# Patient Record
Sex: FEMALE | Race: WHITE | Employment: OTHER | URBAN - METROPOLITAN AREA
[De-identification: names, ages, dates, MRNs, and addresses within clinical notes are randomized per-mention and may not be internally consistent; named-entity substitution may affect disease eponyms.]

---

## 2017-01-16 DIAGNOSIS — G25.81 RESTLESS LEG SYNDROME: Primary | ICD-10-CM

## 2017-01-16 RX ORDER — PRAMIPEXOLE DIHYDROCHLORIDE 1.5 MG/1
1.5 TABLET ORAL 2 TIMES DAILY
Qty: 180 TABLET | Refills: 1 | Status: SHIPPED | OUTPATIENT
Start: 2017-01-16 | End: 2017-07-19

## 2017-01-16 NOTE — TELEPHONE ENCOUNTER
PRAMIPEXOLE     Last Written Prescription Date: 10/26/16  Last Fill Quantity: 180, # refills: 0  Last Office Visit with [facility], [facility] or [facility] prescribing provider: 10/26/16   Next 5 appointments (look out 90 days)     Jan 19, 2017  1:00 PM   Return Visit with Sj Jeffers MD   [facility] ([facility])    [address] [place] [id number]   [phone]            Mar 27, 2017  1:30 PM   Return Visit with Mary Cornejo MD   [facility] ([facility])    [address] [place] [id number] [phone]                   BP Readings from Last 3 Encounters:   10/26/16 107/60   08/12/16 106/70   08/04/16 87/56

## 2017-01-19 ENCOUNTER — OFFICE VISIT (OUTPATIENT)
Dept: NEUROSURGERY | Facility: CLINIC | Age: 57
End: 2017-01-19
Payer: COMMERCIAL

## 2017-01-19 VITALS — HEART RATE: 74 BPM | BODY MASS INDEX: 31.19 KG/M2 | RESPIRATION RATE: 18 BRPM | WEIGHT: 165 LBS | TEMPERATURE: 97.2 F

## 2017-01-19 DIAGNOSIS — G89.29 CHRONIC BILATERAL LOW BACK PAIN WITHOUT SCIATICA: Primary | ICD-10-CM

## 2017-01-19 DIAGNOSIS — M54.50 CHRONIC BILATERAL LOW BACK PAIN WITHOUT SCIATICA: Primary | ICD-10-CM

## 2017-01-19 PROCEDURE — 99213 OFFICE O/P EST LOW 20 MIN: CPT | Performed by: NEUROLOGICAL SURGERY

## 2017-01-19 ASSESSMENT — PAIN SCALES - GENERAL: PAINLEVEL: SEVERE PAIN (6)

## 2017-01-19 NOTE — PROGRESS NOTES
"Vesta Rodriguez is a 56 year old female who presents for:  Chief Complaint   Patient presents with     Neurologic Problem     pt states mid and low back pain is worse        Initial Vitals:  Pulse 74  Temp(Src) 97.2  F (36.2  C) (Temporal)  Resp 18  Wt 74.844 kg (165 lb) Estimated body mass index is 31.19 kg/(m^2) as calculated from the following:    Height as of 12/15/16: 1.549 m (5' 1\").    Weight as of this encounter: 74.844 kg (165 lb).. There is no height on file to calculate BSA. BP completed using cuff size: NA (Not Taken)  Severe Pain (6)    Do you feel safe in your environment?  Yes  Do you need any refills today? No    Nursing Comments:        nursing intake time  Berna Khan    Discharge plan:    nursing discharge time   signature    "

## 2017-01-19 NOTE — PATIENT INSTRUCTIONS
You may call these doctors for a second opinion:   Sesar Kee at [facility] M: [phone]   Dallin Jacques at [facility] Ortho: [phone]  Ashwini Fan at [facility] M: [phone]

## 2017-01-19 NOTE — MR AVS SNAPSHOT
After Visit Summary   1/19/2017    Vesta Rodriguez    MRN: [id number]           Patient Information     Date Of Birth          1960        Visit Information        Provider Department      1/19/2017 1:00 PM Sj Jeffers MD [facility]        Care Instructions    You may call these doctors for a second opinion:   Sesar Kee at [facility] M: [phone]   Dallin Jacques at [facility] Ortho: [phone]  Ashwini Fan at [facility] M: [phone]          Follow-ups after your visit        Your next 10 appointments already scheduled     Mar 27, 2017  1:00 PM   LAB with LAB FIRST FLOOR [facility] ([facility])    [address] [place] [id number] [phone]           Patient must bring picture ID.  Patient should be prepared to give a urine specimen  Please do not eat 10-12 hours before your appointment if you are coming in fasting for labs on lipids, cholesterol, or glucose (sugar).  Pregnant women should follow their Care Team instructions. Water with medications is okay. Do not drink coffee or other fluids.   If you have concerns about taking  your medications, please ask at office or if scheduling via [email or web address], send a message by clicking on Secure Messaging, Message Your Care Team.            Mar 27, 2017  1:30 PM   Return Visit with Mary Cornejo MD   [facility] [place] 2[place]    [address] [place] [id number] [phone]              Who to contact     If you have questions or need follow up information about today's clinic visit or your schedule please contact [facility] directly at [phone].  Normal or non-critical lab and imaging results will be communicated to you by MyChart, letter or phone within 4 business days after the clinic has received the results. If you do not hear from us within 7 days, please contact the clinic through MyChart or phone.  If you have a critical or abnormal lab result, we will notify you by phone as soon as possible.  Submit refill requests through [employer] or call your pharmacy and they will forward the refill request to us. Please allow 3 business days for your refill to be completed.          Additional Information About Your Visit        [employer] Information     [employer] gives you secure access to your electronic health record. If you see a primary care provider, you can also send messages to your care team and make appointments. If you have questions, please call your primary care clinic.  If you do not have a primary care provider, please call [phone] and they will assist you.        Care EveryWhere ID     This is your Care EveryWhere ID. This could be used by other organizations to access your [facility] medical records  [id number]        Your Vitals Were     Pulse Temperature Respirations             74 97.2  F (36.2  C) (Temporal) 18          Blood Pressure from Last 3 Encounters:   10/26/16 107/60   08/12/16 106/70   08/04/16 87/56    Weight from Last 3 Encounters:   01/19/17 165 lb (74.844 kg)   01/19/17 165 lb 1.6 oz (74.889 kg)   12/15/16 159 lb (72.122 kg)              Today, you had the following     No orders found for display       Primary Care Provider Office Phone # Fax #    Lakshmi JAVIER Anguiano [phone]       [address] [address]        Thank you!     Thank you for choosing [facility]  for your care. Our goal is always to provide you with excellent care. Hearing back from our patients is one way we can continue to improve our services. Please take a few minutes to complete the written survey that you may receive in the mail after your visit with us. Thank you!             Your Updated Medication List - Protect others around you: Learn how to safely use, store and throw away your medicines at www.disposemymeds.org.          This  list is accurate as of: 1/19/17  2:18 PM.  Always use your most recent med list.                   Brand Name Dispense Instructions for use    fluticasone-salmeterol 100-50 MCG/DOSE diskus inhaler    ADVAIR    2 Inhaler    Inhale 1 puff into the lungs 2 times daily       ibuprofen 600 MG tablet    ADVIL/MOTRIN    30 tablet    Take 1 tablet (600 mg) by mouth every 8 hours as needed for moderate pain       methimazole 5 MG tablet    TAPAZOLE    90 tablet    Take 1 tablet (5 mg) by mouth daily       omeprazole 20 MG CR capsule    priLOSEC    30 capsule    Take 1 capsule (20 mg) by mouth daily       order for DME      Equipment being ordered: CPAP       pramipexole 1.5 MG tablet    MIRAPEX    180 tablet    Take 1 tablet (1.5 mg) by mouth 2 times daily       REMERON 15 MG tablet   Generic drug:  mirtazapine      Take 15 mg by mouth At Bedtime       teriparatide (recombinant) 600 MCG/2.4ML Soln injection    FORTEO    1 mL    Inject 0.08 mLs (20 mcg) Subcutaneous daily       VISTARIL 25 MG capsule   Generic drug:  hydrOXYzine      Take 25 mg by mouth 3 times daily as needed for itching

## 2017-01-19 NOTE — PROGRESS NOTES
56-year-old female with history of L3 to sacrum anterior-posterior fusion, and L1 kyphoplasty, presents with progressive back pain.  She notes six months of progressive mid and low back pain, radiating side to side, eight out of 10 and aching, worse when she stands or walks.  Whereas she previously used her routinely walk multiple miles in a day, she now is only able to walk less than 50 feet.  She denies radiation into the legs or leg symptoms.  She denies weakness or sensory changes.  She has undergone epidural steroid injection and is currently enrolled in physical therapy.         Past Medical History   Diagnosis Date     Degeneration of cervical intervertebral disc      04     Degeneration of lumbar or lumbosacral intervertebral disc      Anemia due to blood loss, acute 10/21/2012     Anemia      Right ureteral stone      Chronic cholecystitis      Major depression in remission (H)      Vitamin D deficiency      Anxiety      Back pain      Sleep disturbance      RLS (restless legs syndrome)      Osteoporosis      UTI (urinary tract infection)      RECENT- ON ANTIBIOTIC, STILL BACTERIA IN URINE     Past Surgical History   Procedure Laterality Date     Surgical history of -         section     Surgical history of -        Tubal ligation     Surgical history of -        Post hip fracture     Surgical history of -        hardware removal, right iliac crest      Hysterectomy, traci       no oophorectomy     Laparoscopic cholecystectomy  2012     Procedure: LAPAROSCOPIC CHOLECYSTECTOMY;  Laparoscopic vs Open Cholecystectomy;  Surgeon: Esperanza Quinn MD;  Location: [facility] OR     Fusion cervical anterior two levels       C 5-7     Fusion lumbar anterior three+ levels       L3- SI     Laser holmium lithotripsy ureter(s), insert stent, combined  2012     Procedure: COMBINED CYSTOSCOPY, URETEROSCOPY, LASER HOLMIUM LITHOTRIPSY URETER(S), INSERT STENT;  Cystoscopy, Right Ureteroscopy Holmium  Laser Lithotripsy , right ureteral stent placement *Latex Safe*;  Surgeon: Joshua Rm MD;  Location: [facility]     Vertebroplasty       Social History     Social History     Marital Status:      Spouse Name: N/A     Number of Children: N/A     Years of Education: N/A     Occupational History     Not on file.     Social History Main Topics     Smoking status: Current Every Day Smoker -- 1.00 packs/day     Types: Cigarettes     Smokeless tobacco: Never Used     Alcohol Use: No     Drug Use: No     Sexual Activity: No     Other Topics Concern     Parent/Sibling W/ Cabg, Mi Or Angioplasty Before 65f 55m? No     Social History Narrative     Family History   Problem Relation Age of Onset     Breast Cancer Mother       age 31     Alcohol/Drug Other      HEART DISEASE Brother         ROS: 10 point ROS neg other than the symptoms noted above in the HPI.    Physical Exam  Pulse 74  Temp(Src) 97.2  F (36.2  C) (Temporal)  Resp 18  Wt 74.844 kg (165 lb)  HEENT:  Normocephalic, atraumatic.  PERRLA.  EOM s intact.  Visual fields full to gross exam  Neck:  Supple, non-tender, without lymphadenopathy.  Heart:  No peripheral edema  Lungs:  No SOB  Abdomen:  Non-distended.   Skin:  Warm and dry.  Extremities:  No edema, cyanosis or clubbing.    NEUROLOGICAL EXAMINATION:     Mental status:  Alert and Oriented x 3, speech is fluent.  Cranial nerves:  II-XII intact.   Motor:  Strength is 5/5 throughout the upper and lower extremities  Shoulder Abduction:  Right:  5/5   Left:  5/5  Biceps:                      Right:  5/5   Left:  5/5  Triceps:                     Right:  5/5   Left:  5/5  Wrist Extensors:       Right:  5/5   Left:  5/5  Wrist Flexors:           Right:  5/5   Left:  5/5  interosseus :            Right:  5/5   Left:  5/5   Hip Flexor:                Right: 5/5  Left:  5/5  Hip Adductor:             Right:  5/5  Left:  5/5  Hip Abductor:             Right:  5/5  Left:  5/5  Gastroc Soleus:         Right:  5/5  Left:  5/5  Tib/Ant:                      Right:  5/5  Left:  5/5  EHL:                     Right:  5/5  Left:  5/5  Sensation:  Intact  Reflexes:  Negative Babinski.  Negative Clonus.  Negative Fish's.  Coordination:  Smooth finger to nose testing.   Negative pronator drift.  Smooth tandem walking.    A/P:  56-year-old female with history of L3 to sacrum anterior-posterior fusion, and L1 kyphoplasty, presents with progressive back pain    I had a lengthy discussion with the patient, reviewing the history, symptoms and imaging  We reviewed that she has adjacent segment degeneration at L1-2 and L2-3  We also discussed, however, that her back pain could be confounded by her L1 compression fracture and kyphoplasty  We also discussed that a reconstructive operation would be significantly limited by the need to extend well above the compression fracture into the thoracic spine, and need to remove old hardware  Thus surgery for her would involve revision fusion from the thoracic spine to the lumbar, significant blood loss and risk profile

## 2017-01-20 ENCOUNTER — PRE VISIT (OUTPATIENT)
Dept: ORTHOPEDICS | Facility: CLINIC | Age: 57
End: 2017-01-20

## 2017-01-20 NOTE — TELEPHONE ENCOUNTER
1.  Date/reason for appt: 5/9/17 at 8 AM - Low Back Pain  2.  Referring provider: Dr. [facility]  3.  Call to patient (Yes / No - short description):   YES -  Pt transfer from call center, outside recs discussed (2 surgeries total, 1 was a kyphoplasty in 2013)  4.  Previous care at:   - [facility] (Dr. Jeffers and Joseluis Scott)   - [facility] [place]   - [facility]       - [facility] (Dr. Alonso, 2 surgeries/imaging)     2004- 2013 records scanned in epic (emailed JASIEL, need kyphospalsty op note 2013)     12/13/09 Discography report (scanned in)    3/23/09 Dexa (scanned in)   - [facility] (for Dr. Alonso surgeries) - need implant records from lumbar fusion    Op note: 9/30/09 Stage 1 anterior spinal fusion L4-S1    Op note: 9/30/09 Stage 2 posterior spinal fusion L3-S1    Rad Reports: C Arm L3-S1 9/30/16 & 10/21/09 CT L Spine (scanned in [facility])    Hosp D/C Note: 9/30/16   - [facility]     PT Note: 2/1/16 scanned in    Rad Report: 12/28/16 MRI T Spine (report in epic, img in pacs)    Imaging in Pacs: CR L Spine 12/22/16 and XR L spine 9/1/15 (need images pushed)   - [facility]     Seen in 2013 for lumbar fracture (need records)

## 2017-01-31 ENCOUNTER — OFFICE VISIT (OUTPATIENT)
Dept: ORTHOPEDICS | Facility: CLINIC | Age: 57
End: 2017-01-31
Payer: COMMERCIAL

## 2017-01-31 VITALS
BODY MASS INDEX: 31.15 KG/M2 | HEIGHT: 61 IN | WEIGHT: 165 LBS | SYSTOLIC BLOOD PRESSURE: 112 MMHG | DIASTOLIC BLOOD PRESSURE: 68 MMHG | HEART RATE: 72 BPM

## 2017-01-31 DIAGNOSIS — M54.42 CHRONIC BILATERAL LOW BACK PAIN WITH BILATERAL SCIATICA: Primary | ICD-10-CM

## 2017-01-31 DIAGNOSIS — M54.41 CHRONIC BILATERAL LOW BACK PAIN WITH BILATERAL SCIATICA: Primary | ICD-10-CM

## 2017-01-31 DIAGNOSIS — G89.29 CHRONIC BILATERAL LOW BACK PAIN WITH BILATERAL SCIATICA: Primary | ICD-10-CM

## 2017-01-31 PROCEDURE — 99213 OFFICE O/P EST LOW 20 MIN: CPT | Performed by: PHYSICIAN ASSISTANT

## 2017-01-31 RX ORDER — HYDROCODONE BITARTRATE AND ACETAMINOPHEN 5; 325 MG/1; MG/1
1 TABLET ORAL EVERY 8 HOURS PRN
Qty: 30 TABLET | Refills: 0 | Status: SHIPPED | OUTPATIENT
Start: 2017-01-31 | End: 2017-05-08

## 2017-01-31 NOTE — NURSING NOTE
"Initial /68 mmHg  Pulse 72  Ht 5' 1\" (1.549 m)  Wt 165 lb (74.844 kg)  BMI 31.19 kg/m2 Estimated body mass index is 31.19 kg/(m^2) as calculated from the following:    Height as of this encounter: 5' 1\" (1.549 m).    Weight as of this encounter: 165 lb (74.844 kg). .        "

## 2017-01-31 NOTE — PROGRESS NOTES
Sports Medicine Clinic Visit    PCP: Lakshmi Khouryelver Rodriguez is a 56 year old female who is seen  in follow-up presenting with back pain. Was seen by Dr. Jeffers a spine surgeon, but was unable to treat according to patient.    Injury: none    Location of Pain: right hip to groin and down to lower leg, infrequently this occur on the left side as well. Pain radiates up to base of cervical spine.  Duration of Pain: 1 year(s)  Rating of Pain at worst: 10/10  Rating of Pain Currently: 6/10  Symptoms are better with: Nothing  Symptoms are worse with: Walking and going up/down stairs.  Additional Features: Feels that it will give out while walking  Other treatments so far consists of: Nothing  Prior History of related problems: Had hip fracture , has screws removed in , compression fracture and vertebroplasty in     Review of Systems  Musculoskeletal: as above  Remainder of review of systems is negative including constitutional, CV, pulmonary, GI, Skin and Neurologic except as noted in HPI or medical history.    Family history, medical history and surgical history have all been discussed with patient and appended to medical chart below.    Past Medical History   Diagnosis Date     Degeneration of cervical intervertebral disc      04     Degeneration of lumbar or lumbosacral intervertebral disc      Anemia due to blood loss, acute 10/21/2012     Anemia      Right ureteral stone      Chronic cholecystitis      Major depression in remission (H)      Vitamin D deficiency      Anxiety      Back pain      Sleep disturbance      RLS (restless legs syndrome)      Osteoporosis      UTI (urinary tract infection)      RECENT- ON ANTIBIOTIC, STILL BACTERIA IN URINE     Past Surgical History   Procedure Laterality Date     Surgical history of -         section     Surgical history of -        Tubal ligation     Surgical history of -        Post hip fracture     Surgical history of -         "hardware removal, right iliac crest      Hysterectomy, traci       no oophorectomy     Laparoscopic cholecystectomy  2012     Procedure: LAPAROSCOPIC CHOLECYSTECTOMY;  Laparoscopic vs Open Cholecystectomy;  Surgeon: Esperanza Quinn MD;  Location: [facility] OR     Fusion cervical anterior two levels       C 5-7     Fusion lumbar anterior three+ levels       L3- SI     Laser holmium lithotripsy ureter(s), insert stent, combined  2012     Procedure: COMBINED CYSTOSCOPY, URETEROSCOPY, LASER HOLMIUM LITHOTRIPSY URETER(S), INSERT STENT;  Cystoscopy, Right Ureteroscopy Holmium Laser Lithotripsy , right ureteral stent placement *Latex Safe*;  Surgeon: Joshua Rm MD;  Location: [facility]     Vertebroplasty       Family History   Problem Relation Age of Onset     Breast Cancer Mother       age 31     Alcohol/Drug Other      HEART DISEASE Brother      Objective  /68 mmHg  Pulse 72  Ht 5' 1\" (1.549 m)  Wt 165 lb (74.844 kg)  BMI 31.19 kg/m2  Constitutional:well-developed, well-nourished, and in no distress.   Cardiovascular: Intact distal pulses.    Neurological: alert. Gait normal  Skin: Skin is warm and dry.   Psychiatric: Mood and affect normal.   Respiratory: unlabored, speaks in full sentences  Hematologic/Lymphatic/Immunologic: neg lymphadenopathy, neg lymphedema    Exam:  Back Exam:    Inspection:       no visible deformity in the low back       normal skin       normal vascular       normal lymphatic       Increased paralumbar and parathoracic muscle tone    ROM:       limited flexion due to pain       limited extension due to pain       Limited SB and LR    Tender/ Tissue Texture Abnormality:       paraspinal muscles    Non Tender:       remainder of lumbar spine    Strength:       hip flexion 4/5       knee extension 3/5       ankle dorsiflexion 4/5       ankle plantarflexion 4/5       dorsiflexion of the great toe 5/5      Hip abduction: 4/5      Hip adduction:  " 4/5    Reflexes:       patellar (L3, L4) symmetric normal       achilles tendons (S1) symmetric normal    Sensation:      grossly intact throughout lower extremities    Special tests:       straight leg raise left neg         straight leg raise right neg        Radiology:  CLINICAL INFORMATION: Low back and right thigh pain and pressure since fusion surgery 09/30/2009.  TECHNICAL INFORMATION: High resolution thin section CT imaging was performed through the postoperative levels and adjacent transition levels. High resolution coronal and sagittal reformatted images were produced.  INTERPRETATION: AP and lateral digital  radiographs show anterior interbody fusions and anterior screw plates at L4-5 and L5-S1 with dorsal pedicle screw instrumentation and radiolucent connecting rods.  Axial images through the upper sacrum are negative for fracture. A right posterior iliac bone graft harvest site is noted.  At L5-S1, the interbody fusion is solid. Anterior screws are well directed without loosening and pedicle screws are also well directed with no evidence of loosening. There has been wide dorsal decompression with no obvious recurrent disc herniation. Foramina appear patent bilaterally to the extent visualized. L5 pedicle screws are well directed without loosening.  At L4-5, the anterior screw-plate is well positioned. The interbody fusion is healing with areas of incorporation and fusion across the disc interspace already present. There is broad-based residual bulging disc with flattening of the thecal sac and underlying developmentally small central spinal canal. Ganglia exit without compromise and facet joints are unremarkable. A discrete right-sided disc herniation is not apparent although soft tissue contrast resolution within the central spinal canal is limited due to metallic artifacts.  At L3-4, there is no disc herniation or central spinal stenosis. Foramina appear patent. L3 and L4 pedicle screws appear  well directed without loosening.  At L2-3, there is no disc herniation or central stenosis and there is circumferential annular bulging. Foramina appear patent and facet joints are unremarkable.  The L1-2 level is also negative for disc herniation or stenosis  CONCLUSION:  1. The interbody fusions appear solid already at L4-5 and L5-S1 with no instrumentation complications or obvious residual foraminal stenosis. Bulging disc is suspected especially to the left of midline at L4-5 with no apparent right-sided herniation identified, to the extent visualized.  2. The L3-4, L2-3 and L1-2 levels are negative for disc herniation or stenosis.  3. There are no acute fractures identified.  WJM:dread  I have personally reviewed images with patient    Assessment:  1. Chronic bilateral low back pain with bilateral sciatica        Plan:  Discussed the assessment with the patient.  Once again, I have discussed with her multiple treatment options including osteopathic manipulation, manual therapies, along with other physical therapy modalities.  If patient is not satisfied with treatment options provided by orthopedic spine surgeon, I recommend consultation with pain management.  I have provided her a small prescription for pain medication to use sparingly along with her ibuprofen.  I am also recommended she use topical analgesics along with heat and ice. I will not be refilling her pain medication as I do not believe that this is an appropriate long term solution.       Angela Muller PA-C  [facility]  [facility]      Disclaimer: This note consists of symbols derived from keyboarding, dictation and/or voice recognition software. As a result, there may be errors in the script that have gone undetected. Please consider this when interpreting information found in this chart.

## 2017-01-31 NOTE — MR AVS SNAPSHOT
After Visit Summary   1/31/2017    Vesta Rodriguez    MRN: [id number]           Patient Information     Date Of Birth          1960        Visit Information        Provider Department      1/31/2017 9:40 AM Angela Muller PA-C [facility]        Today's Diagnoses     Chronic bilateral low back pain with bilateral sciatica    -  1        Follow-ups after your visit        Additional Services     ORTHO  REFERRAL       [facility] is referring you to the Orthopedic  Services at [facility].       The  Representative will assist you in the coordination of your Orthopedic and Musculoskeletal Care as prescribed by your physician.    The  Representative will call you within 1 business day to help schedule your appointment, or you may contact the  Representative at:    All areas ~ [phone]     Type of Referral : Dr. Dickens - [facility]       Timeframe requested: 3 - 5 days    Coverage of these services is subject to the terms and limitations of your health insurance plan.  Please call member services at your health plan with any benefit or coverage questions.      If X-rays, CT or MRI's have been performed, please contact the facility where they were done to arrange for , prior to your scheduled appointment.  Please bring this referral request to your appointment and present it to your specialist.                  Your next 10 appointments already scheduled     Feb 23, 2017  9:30 AM   (Arrive by 9:15 AM)   New Patient Visit with Ashwini Fan MD   [facility] Neurosurgery ([facility])    [address]  3rd Floor  [facility] [id number] [phone]            Mar 27, 2017  1:00 PM   LAB with LAB FIRST FLOOR [facility] ([facility])    [address] [place] [address] [phone]            Patient must bring picture ID.  Patient should be prepared to give a urine specimen  Please do not eat 10-12 hours before your appointment if you are coming in fasting for labs on lipids, cholesterol, or glucose (sugar).  Pregnant women should follow their Care Team instructions. Water with medications is okay. Do not drink coffee or other fluids.   If you have concerns about taking  your medications, please ask at office or if scheduling via [employer], send a message by clicking on Secure Messaging, Message Your Care Team.            Mar 27, 2017  1:30 PM   Return Visit with Mary Cornejo MD   [facility] ([facility]    [address] [place] [address] [phone]            May 09, 2017  8:00 AM   (Arrive by 7:30 AM)   NEW SPINE with Fabrice Jameson MD   [facility] Orthopaedic [facility] ([facility])    [address] [address] [phone]              Who to contact     If you have questions or need follow up information about today's clinic visit or your schedule please contact [facility] directly at [phone].  Normal or non-critical lab and imaging results will be communicated to you by MyChart, letter or phone within 4 business days after the clinic has received the results. If you do not hear from us within 7 days, please contact the clinic through [employer] or phone. If you have a critical or abnormal lab result, we will notify you by phone as soon as possible.  Submit refill requests through [employer] or call your pharmacy and they will forward the refill request to us. Please allow 3 business days for your refill to be completed.          Additional Information About Your Visit        G[employer] Information     [employer] gives you secure access to your electronic health record. If you see a primary care provider, you can also send messages to your care team and make  "appointments. If you have questions, please call your primary care clinic.  If you do not have a primary care provider, please call [phone] and they will assist you.        Care EveryWhere ID     This is your Care EveryWhere ID. This could be used by other organizations to access your [place] medical records  [id number]        Your Vitals Were     Pulse Height BMI (Body Mass Index)             72 5' 1\" (1.549 m) 31.19 kg/m2          Blood Pressure from Last 3 Encounters:   01/31/17 112/68   10/26/16 107/60   08/12/16 106/70    Weight from Last 3 Encounters:   01/31/17 165 lb (74.844 kg)   01/19/17 165 lb (74.844 kg)   01/19/17 165 lb 1.6 oz (74.889 kg)              We Performed the Following     ORTHO  REFERRAL          Today's Medication Changes          These changes are accurate as of: 1/31/17  9:55 AM.  If you have any questions, ask your nurse or doctor.               Start taking these medicines.        Dose/Directions    HYDROcodone-acetaminophen 5-325 MG per tablet   Commonly known as:  NORCO   Used for:  Chronic bilateral low back pain with bilateral sciatica   Started by:  Angela Muller PA-C        Dose:  1 tablet   Take 1 tablet by mouth every 8 hours as needed for pain   Quantity:  30 tablet   Refills:  0            Where to get your medicines      Some of these will need a paper prescription and others can be bought over the counter.  Ask your nurse if you have questions.     Bring a paper prescription for each of these medications    - HYDROcodone-acetaminophen 5-325 MG per tablet             Primary Care Provider Office Phone # Fax #    JAVIER Child [phone]       [facility] [address] [address]        Thank you!     Thank you for choosing [facility]  for your care. Our goal is always to provide you with excellent care. Hearing back from our patients is one way we can continue to improve our " services. Please take a few minutes to complete the written survey that you may receive in the mail after your visit with us. Thank you!             Your Updated Medication List - Protect others around you: Learn how to safely use, store and throw away your medicines at www.disposemymeds.org.          This list is accurate as of: 1/31/17  9:55 AM.  Always use your most recent med list.                   Brand Name Dispense Instructions for use    fluticasone-salmeterol 100-50 MCG/DOSE diskus inhaler    ADVAIR    2 Inhaler    Inhale 1 puff into the lungs 2 times daily       HYDROcodone-acetaminophen 5-325 MG per tablet    NORCO    30 tablet    Take 1 tablet by mouth every 8 hours as needed for pain       ibuprofen 600 MG tablet    ADVIL/MOTRIN    30 tablet    Take 1 tablet (600 mg) by mouth every 8 hours as needed for moderate pain       methimazole 5 MG tablet    TAPAZOLE    90 tablet    Take 1 tablet (5 mg) by mouth daily       omeprazole 20 MG CR capsule    priLOSEC    30 capsule    Take 1 capsule (20 mg) by mouth daily       order for DME      Equipment being ordered: CPAP       pramipexole 1.5 MG tablet    MIRAPEX    180 tablet    Take 1 tablet (1.5 mg) by mouth 2 times daily       REMERON 15 MG tablet   Generic drug:  mirtazapine      Take 15 mg by mouth At Bedtime       teriparatide (recombinant) 600 MCG/2.4ML Soln injection    FORTEO    1 mL    Inject 0.08 mLs (20 mcg) Subcutaneous daily       VISTARIL 25 MG capsule   Generic drug:  hydrOXYzine      Take 25 mg by mouth 3 times daily as needed for itching

## 2017-02-06 ENCOUNTER — OFFICE VISIT (OUTPATIENT)
Dept: FAMILY MEDICINE | Facility: CLINIC | Age: 57
End: 2017-02-06
Payer: MEDICARE

## 2017-02-06 VITALS
HEART RATE: 102 BPM | OXYGEN SATURATION: 98 % | DIASTOLIC BLOOD PRESSURE: 72 MMHG | WEIGHT: 164.2 LBS | SYSTOLIC BLOOD PRESSURE: 122 MMHG | RESPIRATION RATE: 24 BRPM | HEIGHT: 62 IN | TEMPERATURE: 97.8 F | BODY MASS INDEX: 30.22 KG/M2

## 2017-02-06 DIAGNOSIS — M81.0 OSTEOPOROSIS: ICD-10-CM

## 2017-02-06 DIAGNOSIS — E05.00 GRAVES DISEASE: Primary | ICD-10-CM

## 2017-02-06 DIAGNOSIS — M51.26 DISPLACEMENT OF LUMBAR INTERVERTEBRAL DISC WITHOUT MYELOPATHY: ICD-10-CM

## 2017-02-06 LAB
CALCIUM SERPL-MCNC: 9.8 MG/DL (ref 8.5–10.1)
TSH SERPL DL<=0.005 MIU/L-ACNC: 0.59 MU/L (ref 0.4–4)

## 2017-02-06 PROCEDURE — 84443 ASSAY THYROID STIM HORMONE: CPT | Performed by: NURSE PRACTITIONER

## 2017-02-06 PROCEDURE — 82310 ASSAY OF CALCIUM: CPT | Performed by: NURSE PRACTITIONER

## 2017-02-06 PROCEDURE — 36415 COLL VENOUS BLD VENIPUNCTURE: CPT | Performed by: NURSE PRACTITIONER

## 2017-02-06 PROCEDURE — 99214 OFFICE O/P EST MOD 30 MIN: CPT | Performed by: NURSE PRACTITIONER

## 2017-02-06 ASSESSMENT — ANXIETY QUESTIONNAIRES
3. WORRYING TOO MUCH ABOUT DIFFERENT THINGS: NEARLY EVERY DAY
5. BEING SO RESTLESS THAT IT IS HARD TO SIT STILL: NEARLY EVERY DAY
GAD7 TOTAL SCORE: 20
6. BECOMING EASILY ANNOYED OR IRRITABLE: NEARLY EVERY DAY
1. FEELING NERVOUS, ANXIOUS, OR ON EDGE: NEARLY EVERY DAY
7. FEELING AFRAID AS IF SOMETHING AWFUL MIGHT HAPPEN: MORE THAN HALF THE DAYS
2. NOT BEING ABLE TO STOP OR CONTROL WORRYING: NEARLY EVERY DAY

## 2017-02-06 ASSESSMENT — PAIN SCALES - GENERAL: PAINLEVEL: MILD PAIN (2)

## 2017-02-06 ASSESSMENT — PATIENT HEALTH QUESTIONNAIRE - PHQ9: 5. POOR APPETITE OR OVEREATING: NEARLY EVERY DAY

## 2017-02-06 NOTE — PROGRESS NOTES
SUBJECTIVE:                                                    Vesta Rodriguez is a 56 year old female who presents to clinic today for the following health issues:    Hypothyroidism Follow-up      Since last visit, patient describes the following symptoms: weight gain of 15 lbs       Amount of exercise or physical activity: None, unable due to back    Problems taking medications regularly: No    Medication side effects: none    Diet: regular (no restrictions)      Back Pain Follow Up       Description:   Location of pain:  bilateral  Character of pain: sharp  Pain radiation: radiates into the right buttocks and radiates into the left buttocks  Since last visit, pain is:  worsened  New numbness or weakness in legs, not attributed to pain:  YES weakness    Intensity: Currently 2/10, At its worst 10/10    History:   Pain interferes with job: Not applicable  Therapies tried without relief:  NSAIDs and opioids  Therapies tried with relief: heat, cold   Patient has an appointment with spine surgeon Dr Alonso. Had MRIs done in [place]       Accompanying Signs & Symptoms:  Risk of Fracture:  None  Risk of Cauda Equina:  None  Risk of Infection:  None  Risk of Cancer:  None       Hsitory includes L3 to sacrum anterior-posterior fusion, and L1 kyphoplasty. She has had progressive back pain which has been lessening her activity. Received epidural steroid injections and physical therapy. Saw Dr. Jeffers/ [facility] on 1/19/17 to be evaluated for possible surgery. Reports she was refused surgery. Now wants to see Dr. Alonso, her spine surgeon that did prior surgery.    Use of Aqua K pad in the past has been helpful. Needs order to obtain.      Amount of exercise or physical activity: None    Problems taking medications regularly: No    Medication side effects: none    Diet: regular (no restrictions)    Problem list and histories reviewed & adjusted, as indicated.  Additional history: as documented    Problem list,  "Medication list, Allergies, and Medical/Social/Surgical histories reviewed in EPIC and updated as appropriate.    ROS:  Constitutional, HEENT, cardiovascular, pulmonary, gi and gu systems are negative, except as otherwise noted.    OBJECTIVE:                                                    /72 mmHg  Pulse 102  Temp(Src) 97.8  F (36.6  C) (Tympanic)  Resp 24  Ht 5' 1.75\" (1.568 m)  Wt 164 lb 3.2 oz (74.481 kg)  BMI 30.29 kg/m2  SpO2 98%  Breastfeeding? No  Body mass index is 30.29 kg/(m^2).  GENERAL: healthy, alert and no distress  MS: extremities normal- no gross deformities noted  Comprehensive back pain exam:  Tenderness of lumbar paraspinal region    Diagnostic Test Results:  none      ASSESSMENT/PLAN:                                                      1. Displacement of lumbar intervertebral disc without myelopathy  Patient is planning to see Dr. Henson for his opinion regarding further surgery. As aqua K pad has been helpful in the past, will provide order  - order for DME; Equipment being ordered: Aqua K pad. Both pump and pad  Dispense: 1 Units; Refill: 0    2. Graves disease  - TSH with free T4 reflex    3. Osteoporosis  Noted in previous DEXA scan.   - Calcium    Patient Instructions   We will call you with the results of your labs.         Tammy Tyler, FLAVIA, APRN [facility]    "

## 2017-02-06 NOTE — MR AVS SNAPSHOT
After Visit Summary   2/6/2017    Vesta Rodriguez    MRN: [id number]           Patient Information     Date Of Birth          1960        Visit Information        Provider Department      2/6/2017 3:40 PM Tammy Tyler APRN CNP [facility]        Today's Diagnoses     Graves disease    -  1     Displacement of lumbar intervertebral disc without myelopathy         Osteoporosis           Care Instructions    We will call you with the results of your labs.         Follow-ups after your visit        Your next 10 appointments already scheduled     Mar 27, 2017  1:00 PM   LAB with LAB FIRST FLOOR [facility] ([facility])    [address] [place] [id number] [phone]           Patient must bring picture ID.  Patient should be prepared to give a urine specimen  Please do not eat 10-12 hours before your appointment if you are coming in fasting for labs on lipids, cholesterol, or glucose (sugar).  Pregnant women should follow their Care Team instructions. Water with medications is okay. Do not drink coffee or other fluids.   If you have concerns about taking  your medications, please ask at office or if scheduling via [employer], send a message by clicking on Secure Messaging, Message Your Care Team.            Mar 27, 2017  1:30 PM   Return Visit with aMry Cornejo MD   [facility])    [address] [place] [address] [phone]              Who to contact     If you have questions or need follow up information about today's clinic visit or your schedule please contact [facility] directly at [phone].  Normal or non-critical lab and imaging results will be communicated to you by MyChart, letter or phone within 4 business days after the clinic has received the results. If you do not hear from us within 7 days, please contact the clinic  "through [employer] or phone. If you have a critical or abnormal lab result, we will notify you by phone as soon as possible.  Submit refill requests through [employer] or call your pharmacy and they will forward the refill request to us. Please allow 3 business days for your refill to be completed.          Additional Information About Your Visit        [employer] Information     [employer] gives you secure access to your electronic health record. If you see a primary care provider, you can also send messages to your care team and make appointments. If you have questions, please call your primary care clinic.  If you do not have a primary care provider, please call [phone] and they will assist you.        Care EveryWhere ID     This is your Care EveryWhere ID. This could be used by other organizations to access your [facility] medical records  [id number]        Your Vitals Were     Pulse Temperature Respirations    102 97.8  F (36.6  C) (Tympanic) 24    Height BMI (Body Mass Index) Pulse Oximetry    5' 1.75\" (1.568 m) 30.29 kg/m2 98%    Breastfeeding?          No         Blood Pressure from Last 3 Encounters:   02/06/17 122/72   01/31/17 112/68   10/26/16 107/60    Weight from Last 3 Encounters:   02/06/17 164 lb 3.2 oz (74.481 kg)   01/31/17 165 lb (74.844 kg)   01/19/17 165 lb (74.844 kg)              We Performed the Following     Calcium     TSH with free T4 reflex          Today's Medication Changes          These changes are accurate as of: 2/6/17  4:22 PM.  If you have any questions, ask your nurse or doctor.               Start taking these medicines.        Dose/Directions    order for DME   Used for:  Displacement of lumbar intervertebral disc without myelopathy   Started by:  Tammy Tyler, JAVIER CNP        Equipment being ordered: Aqua K pad. Both pump and pad   Quantity:  1 Units   Refills:  0         Stop taking these medicines if you haven't already. Please contact your care team if you have questions.  "    ibuprofen 600 MG tablet   Commonly known as:  ADVIL/MOTRIN   Stopped by:  Tammy Tyler APRN CNP           omeprazole 20 MG CR capsule   Commonly known as:  priLOSEC   Stopped by:  Tammy Tyler APRN CNP           REMERON 15 MG tablet   Generic drug:  mirtazapine   Stopped by:  Tammy Tyler APRN CNP                Where to get your medicines      Some of these will need a paper prescription and others can be bought over the counter.  Ask your nurse if you have questions.     Bring a paper prescription for each of these medications    - order for DME             Primary Care Provider Office Phone # Fax #    Lakshmi Tejada JAVIER Khoury [phone]       [address] [address]        Thank you!     Thank you for choosing [facility]  for your care. Our goal is always to provide you with excellent care. Hearing back from our patients is one way we can continue to improve our services. Please take a few minutes to complete the written survey that you may receive in the mail after your visit with us. Thank you!             Your Updated Medication List - Protect others around you: Learn how to safely use, store and throw away your medicines at www.disposemymeds.org.          This list is accurate as of: 2/6/17  4:22 PM.  Always use your most recent med list.                   Brand Name Dispense Instructions for use    fluticasone-salmeterol 100-50 MCG/DOSE diskus inhaler    ADVAIR    2 Inhaler    Inhale 1 puff into the lungs 2 times daily       HYDROcodone-acetaminophen 5-325 MG per tablet    NORCO    30 tablet    Take 1 tablet by mouth every 8 hours as needed for pain       methimazole 5 MG tablet    TAPAZOLE    90 tablet    Take 1 tablet (5 mg) by mouth daily       order for DME      Equipment being ordered: CPAP       order for DME     1 Units    Equipment being ordered: Aqua K pad. Both pump and pad       pramipexole 1.5  MG tablet    MIRAPEX    180 tablet    Take 1 tablet (1.5 mg) by mouth 2 times daily       teriparatide (recombinant) 600 MCG/2.4ML Soln injection    FORTEO    1 mL    Inject 0.08 mLs (20 mcg) Subcutaneous daily       VISTARIL 25 MG capsule   Generic drug:  hydrOXYzine      Take 25 mg by mouth 3 times daily as needed for itching

## 2017-02-07 ASSESSMENT — ANXIETY QUESTIONNAIRES: GAD7 TOTAL SCORE: 20

## 2017-02-07 ASSESSMENT — PATIENT HEALTH QUESTIONNAIRE - PHQ9: SUM OF ALL RESPONSES TO PHQ QUESTIONS 1-9: 9

## 2017-02-10 ENCOUNTER — MYC MEDICAL ADVICE (OUTPATIENT)
Dept: GERIATRICS | Facility: CLINIC | Age: 57
End: 2017-02-10

## 2017-02-10 NOTE — TELEPHONE ENCOUNTER
Spoke with Jihan at  [facility] [phone]. They are requesting the [employer] to call them. As the need to know how much it will cost Etc.  Spoke with Rianna at  [employer] [phone] she will contact the [employer].  Pt is aware that Rianna will be working on this for her.   [facility]

## 2017-02-17 ENCOUNTER — MEDICAL CORRESPONDENCE (OUTPATIENT)
Dept: HEALTH INFORMATION MANAGEMENT | Facility: CLINIC | Age: 57
End: 2017-02-17

## 2017-02-17 ENCOUNTER — TRANSFERRED RECORDS (OUTPATIENT)
Dept: HEALTH INFORMATION MANAGEMENT | Facility: CLINIC | Age: 57
End: 2017-02-17

## 2017-03-08 ENCOUNTER — OFFICE VISIT (OUTPATIENT)
Dept: FAMILY MEDICINE | Facility: CLINIC | Age: 57
End: 2017-03-08
Payer: MEDICARE

## 2017-03-08 VITALS
BODY MASS INDEX: 31.35 KG/M2 | RESPIRATION RATE: 18 BRPM | WEIGHT: 170 LBS | SYSTOLIC BLOOD PRESSURE: 118 MMHG | OXYGEN SATURATION: 99 % | DIASTOLIC BLOOD PRESSURE: 60 MMHG | HEART RATE: 80 BPM | TEMPERATURE: 98.1 F

## 2017-03-08 DIAGNOSIS — E05.00 GRAVES DISEASE: ICD-10-CM

## 2017-03-08 DIAGNOSIS — J42 CHRONIC BRONCHITIS, UNSPECIFIED CHRONIC BRONCHITIS TYPE (H): ICD-10-CM

## 2017-03-08 DIAGNOSIS — Z01.818 PREOP GENERAL PHYSICAL EXAM: ICD-10-CM

## 2017-03-08 DIAGNOSIS — M51.9 DISC DISORDER OF LUMBAR REGION: ICD-10-CM

## 2017-03-08 PROBLEM — Z71.89 ADVANCED DIRECTIVES, COUNSELING/DISCUSSION: Status: ACTIVE | Noted: 2017-03-08

## 2017-03-08 PROCEDURE — 99214 OFFICE O/P EST MOD 30 MIN: CPT | Performed by: NURSE PRACTITIONER

## 2017-03-08 NOTE — MR AVS SNAPSHOT
After Visit Summary   3/8/2017    Vesta Rodriguez    MRN: [id number]           Patient Information     Date Of Birth          1960        Visit Information        Provider Department      3/8/2017 9:40 AM Tammy Tyler APRN CNP [facility]        Today's Diagnoses     Intervertebral disc prolapse with impingement    -  1    Disc disorder of lumbar region        Preop general physical exam          Care Instructions      Before Your Surgery      Call your surgeon if there is any change in your health. This includes signs of a cold or flu (such as a sore throat, runny nose, cough, rash or fever).    Do not smoke, drink alcohol or take over the counter medicine (unless your surgeon or primary care doctor tells you to) for the 24 hours before and after surgery.    If you take prescribed drugs: Follow your doctor s orders about which medicines to take and which to stop until after surgery.    Eating and drinking prior to surgery: follow the instructions from your surgeon    Take a shower or bath the night before surgery. Use the soap your surgeon gave you to gently clean your skin. If you do not have soap from your surgeon, use your regular soap. Do not shave or scrub the surgery site.  Wear clean pajamas and have clean sheets on your bed.         Follow-ups after your visit        Your next 10 appointments already scheduled     Mar 09, 2017   Procedure with Pérez Valle MD   [facility] ([facility])    [address] [place] [address]   [phone]           From [address]: Exit at [address] on south side of [place]. Turn right on [address]. Turn left at stoplight on [address]. [facility] will be in view two blocks ahead            Mar 09, 2017  9:30 AM CST   XR SURGERY KATHRYN LESS THAN 5 MIN FLUORO W STILLS with PHCARM1   [facility] [place] ([facility])    [address]  [place] [address] [phone]           Please bring a list of your current medicines to your exam. (Include vitamins, minerals and over-thecounter medicines.) Leave your valuables at home.  Tell your doctor if there is a chance you may be pregnant.  You do not need to do anything special for this exam.              Future tests that were ordered for you today     Open Future Orders        Priority Expected Expires Ordered    XR Surgery KATHRYN L/T 5 Min Fluoro w Stills Routine 3/7/2017 3/7/2018 3/7/2017            Who to contact     If you have questions or need follow up information about today's clinic visit or your schedule please contact [facility] directly at [phone].  Normal or non-critical lab and imaging results will be communicated to you by [email or web address], letter or phone within 4 business days after the clinic has received the results. If you do not hear from us within 7 days, please contact the clinic through Dott or phone. If you have a critical or abnormal lab result, we will notify you by phone as soon as possible.  Submit refill requests through FieldAware or call your pharmacy and they will forward the refill request to us. Please allow 3 business days for your refill to be completed.          Additional Information About Your Visit        FieldAware Information     Field[employer]ware gives you secure access to your electronic health record. If you see a primary care provider, you can also send messages to your care team and make appointments. If you have questions, please call your primary care clinic.  If you do not have a primary care provider, please call [phone] and they will assist you.        Care EveryWhere ID     This is your Care EveryWhere ID. This could be used by other organizations to access your [place] medical records  [id number]        Your Vitals Were     Pulse Temperature Respirations Pulse Oximetry Breastfeeding? BMI (Body Mass Index)    80 98.1  F (36.7  C)  (Tympanic) 18 99% No 31.35 kg/m2       Blood Pressure from Last 3 Encounters:   03/08/17 118/60   02/06/17 122/72   01/31/17 112/68    Weight from Last 3 Encounters:   03/08/17 170 lb (77.1 kg)   02/06/17 164 lb 3.2 oz (74.5 kg)   01/31/17 165 lb (74.8 kg)              Today, you had the following     No orders found for display         Today's Medication Changes          These changes are accurate as of: 3/8/17 10:51 AM.  If you have any questions, ask your nurse or doctor.               These medicines have changed or have updated prescriptions.        Dose/Directions    * order for DME   This may have changed:  Another medication with the same name was added. Make sure you understand how and when to take each.   Used for:  Displacement of lumbar intervertebral disc without myelopathy   Changed by:  Tammy Tyler APRN CNP        Equipment being ordered: Aqua K pad. Both pump and pad   Quantity:  1 Units   Refills:  0       * order for DME   This may have changed:  You were already taking a medication with the same name, and this prescription was added. Make sure you understand how and when to take each.   Used for:  Intervertebral disc prolapse with impingement   Changed by:  Tammy Tyler APRN CNP        Equipment being ordered: Aqua K pad. Diagnoses of progressive back pain with history of L3- sacral fusion, disc extrusion with impingement L2-3 and at L1-2.   Quantity:  1 Device   Refills:  0       * Notice:  This list has 2 medication(s) that are the same as other medications prescribed for you. Read the directions carefully, and ask your doctor or other care provider to review them with you.         Where to get your medicines      Some of these will need a paper prescription and others can be bought over the counter.  Ask your nurse if you have questions.     Bring a paper prescription for each of these medications     order for DME                Primary Care Provider Office Phone # Fax #     JAVIER Child [phone]       [facility] [address] [facility] [address]        Thank you!     Thank you for choosing [facility]  for your care. Our goal is always to provide you with excellent care. Hearing back from our patients is one way we can continue to improve our services. Please take a few minutes to complete the written survey that you may receive in the mail after your visit with us. Thank you!             Your Updated Medication List - Protect others around you: Learn how to safely use, store and throw away your medicines at www.disposemymeds.org.          This list is accurate as of: 3/8/17 10:51 AM.  Always use your most recent med list.                   Brand Name Dispense Instructions for use    fluticasone-salmeterol 100-50 MCG/DOSE diskus inhaler    ADVAIR    2 Inhaler    Inhale 1 puff into the lungs 2 times daily       HYDROcodone-acetaminophen 5-325 MG per tablet    NORCO    30 tablet    Take 1 tablet by mouth every 8 hours as needed for pain       methimazole 5 MG tablet    TAPAZOLE    90 tablet    Take 1 tablet (5 mg) by mouth daily       order for DME      Equipment being ordered: CPAP       * order for DME     1 Units    Equipment being ordered: Aqua K pad. Both pump and pad       * order for DME     1 Device    Equipment being ordered: Aqua K pad. Diagnoses of progressive back pain with history of L3- sacral fusion, disc extrusion with impingement L2-3 and at L1-2.       pramipexole 1.5 MG tablet    MIRAPEX    180 tablet    Take 1 tablet (1.5 mg) by mouth 2 times daily       teriparatide (recombinant) 600 MCG/2.4ML Soln injection    FORTEO    1 mL    Inject 0.08 mLs (20 mcg) Subcutaneous daily       VISTARIL 25 MG capsule   Generic drug:  hydrOXYzine      Take 25 mg by mouth 3 times daily as needed for itching       * Notice:  This list has 2 medication(s) that are the same as other medications prescribed for you. Read  the directions carefully, and ask your doctor or other care provider to review them with you.

## 2017-03-08 NOTE — PROGRESS NOTES
[facility]  [address] [facility] [address]  [phone]  Dept: [phone]    PRE-OP EVALUATION:  Today's date: 3/8/2017    Vesta Rodriguez (: 1960) presents for pre-operative evaluation assessment as requested by Pérez Mobley.  She requires evaluation and anesthesia risk assessment prior to undergoing surgery/procedure for treatment of right central disc extrusion L2-3 with impingement, disc extrusion L1-2 .  Proposed procedure: inject Epidural Transforaminal, CAM L/T 5 min Fluoro w Stills.    Date of Surgery/ Procedure: 3/9/2017  Time of Surgery/ Procedure: 9:30  Hospital/Surgical Facility: [facility]  Primary Physician: Lakshmi Khoury  Type of Anesthesia Anticipated: to be determined    Patient has a Health Care Directive or Living Will:  YES brought copy with    1. NO - Do you have a history of heart attack, stroke, stent, bypass or surgery on an artery in the head, neck, heart or legs?  2. NO - Do you ever have any pain or discomfort in your chest?  3. NO - Do you have a history of  Heart Failure?  4. NO - Are you troubled by shortness of breath when: walking on the level, up a slight hill or at night?  5. NO - Do you currently have a cold, bronchitis or other respiratory infection?  6. NO - Do you have a cough, shortness of breath or wheezing?  7. NO - Do you sometimes get pains in the calves of your legs when you walk?  8. NO - Do you or anyone in your family have previous history of blood clots?  9. NO - Do you or does anyone in your family have a serious bleeding problem such as prolonged bleeding following surgeries or cuts?  10. NO - Have you ever had problems with anemia or been told to take iron pills?  11. NO - Have you had any abnormal blood loss such as black, tarry or bloody stools, or abnormal vaginal bleeding?  12. YES - HAVE YOU EVER HAD A BLOOD TRANSFUSION? No complications  13. NO - Have you or any of your relatives ever had problems with  anesthesia?  14. YES - DO YOU HAVE SLEEP APNEA, EXCESSIVE SNORING OR DAYTIME DROWSINESS? Sleep apnea, does not wear cpap.   15. NO - Do you have any prosthetic heart valves?  16. NO - Do you have prosthetic joints?  17. NO - Is there any chance that you may be pregnant?      HPI:                                                      Brief HPI related to upcoming procedure: chronic bilateral low back pain with sciatica, progressing over the past 6 or so months. Past history includes L3 to sacrum anterior-posterior fusion, and L1 kyphoplasty. Scheduled for epidural injection then anticipates surgery in 4-6 weeks      See problem list for active medical problems.  Problems all longstanding and stable, except as noted/documented.  See ROS for pertinent symptoms related to these conditions.                                                                                                    .  COPD - Patient has a longstanding history of moderate-severe COPD . Patient has been doing well overall noting no symptoms  currently and continues on medication regimen consisting of Advair without adverse reactions or side effects.                                                                                                         .  GRAVES DISEASE - Patient has been doing well on Tapazole, noting no tremor, insomnia, hair loss or changes in skin texture. Last TSH value of 0.59. Continues to take medications as directed, without adverse reactions or side effects.                                                                                                                                                                             MEDICAL HISTORY:                                                      Patient Active Problem List    Diagnosis Date Noted     Major depressive disorder, recurrent episode, moderate (H) 10/24/2016     Priority: Medium     S/P cholecystectomy 08/04/2016     Priority: Medium     Graves disease 01/25/2016      Priority: Medium     Alcoholism (H) 11/19/2014     Priority: Medium     Sober since 2014.         H/O vertebroplasty 08/12/2013     Priority: Medium     Thyroid nodule; on outsie dc,due for f/u 5/13 04/19/2013     Priority: Medium     ultrasound may , 2013  Due for followup          Right ureteral stone 10/05/2012     Priority: Medium     8/1612         Chronic cholecystitis 09/04/2012     Priority: Medium     Tobacco user 08/28/2011     Priority: Medium     Family history of breast cancer in mother 08/28/2011     Priority: Medium     Vitamin D deficiency 08/28/2011     Priority: Medium     CARDIOVASCULAR SCREENING; LDL GOAL LESS THAN 130 10/31/2010     Priority: Medium     COPD (chronic obstructive pulmonary disease); spirometry 3/10 ; rare use of mdi 03/15/2010     Priority: Medium     Anxiety 09/26/2008     Priority: Medium     Disturbance in sleep behavior 10/08/2007     Priority: Medium     Has failed mult meds, - lunesta, ambien, klonopin  etoh treatment 2013  Not candidate for klonopin      Problem list name updated by automated process. Provider to review       Displacement of cervical intervertebral disc without myelopathy 10/08/2007     Priority: Medium     Cervical fusion. [facility].     2004       Restless legs syndrome (RLS) 07/30/2007     Priority: Medium     On mirapex, unable to go off     0.5 mg twice a day , for rls        Displacement of lumbar intervertebral disc without myelopathy 02/20/2006     Priority: Medium     2 disc fusion        Brachial neuritis or radiculitis 07/06/2005     Priority: Medium     Problem list name updated by automated process. Provider to review       Osteoporosis 07/06/2005     Priority: Medium     Hip fracture after bone graft.     forteo 3/09 - until summer 2010  Problem list name updated by automated process. Provider to review        Past Medical History   Diagnosis Date     Anemia      Anemia due to blood loss, acute 10/21/2012     Anxiety      Back pain       Chronic cholecystitis      Degeneration of cervical intervertebral disc      04     Degeneration of lumbar or lumbosacral intervertebral disc      Major depression in remission (H)      Osteoporosis      Right ureteral stone      RLS (restless legs syndrome)      Sleep disturbance      UTI (urinary tract infection)      RECENT- ON ANTIBIOTIC, STILL BACTERIA IN URINE     Vitamin D deficiency      Past Surgical History   Procedure Laterality Date     Surgical history of -         section     Surgical history of -        Tubal ligation     Surgical history of -        Post hip fracture     Surgical history of -        hardware removal, right iliac crest      Hysterectomy, traci       no oophorectomy     Laparoscopic cholecystectomy  2012     Procedure: LAPAROSCOPIC CHOLECYSTECTOMY;  Laparoscopic vs Open Cholecystectomy;  Surgeon: Esperanza Quinn MD;  Location: [facility]     Fusion cervical anterior two levels       C 5-7     Fusion lumbar anterior three+ levels       L3- SI     Laser holmium lithotripsy ureter(s), insert stent, combined  2012     Procedure: COMBINED CYSTOSCOPY, URETEROSCOPY, LASER HOLMIUM LITHOTRIPSY URETER(S), INSERT STENT;  Cystoscopy, Right Ureteroscopy Holmium Laser Lithotripsy , right ureteral stent placement *Latex Safe*;  Surgeon: Joshua Rm MD;  Location: [facility]     Vertebroplasty       Current Outpatient Prescriptions   Medication Sig Dispense Refill     order for DME Equipment being ordered: Aqua K pad. Both pump and pad 1 Units 0     HYDROcodone-acetaminophen (NORCO) 5-325 MG per tablet Take 1 tablet by mouth every 8 hours as needed for pain 30 tablet 0     pramipexole (MIRAPEX) 1.5 MG tablet Take 1 tablet (1.5 mg) by mouth 2 times daily 180 tablet 1     teriparatide, recombinant, (FORTEO) 600 MCG/2.4ML SOLN injection Inject 0.08 mLs (20 mcg) Subcutaneous daily 1 mL 11     methimazole (TAPAZOLE) 5 MG tablet Take 1 tablet (5 mg) by mouth  daily 90 tablet 3     hydrOXYzine (VISTARIL) 25 MG capsule Take 25 mg by mouth 3 times daily as needed for itching       fluticasone-salmeterol (ADVAIR) 100-50 MCG/DOSE diskus inhaler Inhale 1 puff into the lungs 2 times daily 2 Inhaler 10     order for DME Equipment being ordered: CPAP       OTC products: None, except as noted above    Allergies   Allergen Reactions     Ambien [Zolpidem Tartrate]      Mental status changes     Asa [Aspirin]      Accidental overdose.     Celexa [Citalopram Hydrobromide]      review with pt., i cant' find side effect in chart. 2/9/06 Sylvie Mederos MD       Doxychel [Doxycycline Hyclate]      Gabapentin      Rectalurgency      Tape [Adhesive Tape]      Zoloft      Wt gain       Latex Allergy: no, but allergic to adhesive tape, must use paper tape    Social History   Substance Use Topics     Smoking status: Current Every Day Smoker     Packs/day: 1.00     Types: Cigarettes     Smokeless tobacco: Never Used     Alcohol use No     History   Drug Use No       REVIEW OF SYSTEMS:                                                    C: NEGATIVE for fever, chills, change in weight  INTEGUMENTARY/SKIN: concerned about lump on upper back, midline at base of neck  E: NEGATIVE for vision changes or irritation  E/M: NEGATIVE for ear, mouth and throat problems  R: NEGATIVE for significant cough or SOB  CV: NEGATIVE for chest pain, palpitations or peripheral edema  GI: NEGATIVE for nausea, abdominal pain, heartburn, or change in bowel habits  : NEGATIVE for frequency, dysuria, or hematuria  MUSCULOSKELETAL:back pain  N: NEGATIVE for weakness, dizziness or paresthesias  P: NEGATIVE for changes in mood or affect    EXAM:                                                    There were no vitals taken for this visit.    GENERAL APPEARANCE: healthy, alert and no distress     EYES: Eyes grossly normal to inspection and conjunctivae and sclerae normal     NECK: no adenopathy, no asymmetry, masses, or scars  and thyroid normal to palpation     RESP: lungs clear to auscultation - no rales, rhonchi or wheezes     CV: regular rates and rhythm, normal S1 S2, no S3 or S4 and no murmur, click or rub     MS: extremities normal- no gross deformities noted, no evidence of inflammation in joints, FROM in all extremities.     SKIN: 1 cm slightly raised nodule at posterior base of neck, mobile, soft. Seems consistent with fatty lipoma     NEURO: Normal strength and tone, sensory exam grossly normal, mentation intact and speech normal     PSYCH: mentation appears normal. and affect normal/bright     LYMPHATICS: normal ant/post cervical, supraclavicular nodes    DIAGNOSTICS:                                                    No labs or EKG required for low risk surgery (cataract, skin procedure, breast biopsy, etc)    Recent Labs   Lab Test  08/04/16   1222  03/07/16   0954  11/23/15   1506   03/12/12   1105   HGB   --   13.5  13.1   < >   --    PLT   --   248  254   < >   --    NA  141  140   --    < >   --    POTASSIUM  3.4  3.8   --    < >   --    CR  0.68  0.62   --    < >   --    A1C   --    --    --    --   5.8    < > = values in this interval not displayed.        IMPRESSION:                                                    Reason for surgery/procedure: porgressive low back pain with sciatica    The proposed surgical procedure is considered LOW risk.    REVISED CARDIAC RISK INDEX  The patient has the following serious cardiovascular risks for perioperative complications such as (MI, PE, VFib and 3  AV Block):  No serious cardiac risks  INTERPRETATION: 0 risks: Class I (very low risk - 0.4% complication rate)    The patient has the following additional risks for perioperative complications:  No identified additional risks      ICD-10-CM    1. Intervertebral disc prolapse with impingement M51.9    2. Disc disorder of lumbar region M51.9      3. Graves Disease- stable  4. COPD-stable on Advair    RECOMMENDATIONS:                                                         Obstructive Sleep Apnea (or suspected sleep apnea)  Patient does not use cpap at home      --Patient is to take all scheduled medications on the day of surgery      APPROVAL GIVEN to proceed with proposed procedure, without further diagnostic evaluation       Signed Electronically by: Tammy Tyler NP, APRN CNP    Copy of this evaluation report is provided to requesting physician.    [facility] Preop Guidelines

## 2017-03-08 NOTE — NURSING NOTE
"Chief Complaint   Patient presents with     Pre-Op Exam       Initial There were no vitals taken for this visit. Estimated body mass index is 30.28 kg/(m^2) as calculated from the following:    Height as of 2/6/17: 5' 1.75\" (1.568 m).    Weight as of 2/6/17: 164 lb 3.2 oz (74.5 kg).  Medication Reconciliation: complete  "

## 2017-03-09 ENCOUNTER — ANESTHESIA (OUTPATIENT)
Dept: SURGERY | Facility: CLINIC | Age: 57
End: 2017-03-09
Payer: MEDICARE

## 2017-03-09 ENCOUNTER — HOSPITAL ENCOUNTER (OUTPATIENT)
Facility: CLINIC | Age: 57
Discharge: HOME | End: 2017-03-09
Attending: ANESTHESIOLOGY | Admitting: ANESTHESIOLOGY
Payer: MEDICARE

## 2017-03-09 ENCOUNTER — HOSPITAL ENCOUNTER (OUTPATIENT)
Dept: GENERAL RADIOLOGY | Facility: CLINIC | Age: 57
End: 2017-03-09
Attending: ANESTHESIOLOGY | Admitting: ANESTHESIOLOGY
Payer: MEDICARE

## 2017-03-09 ENCOUNTER — ANESTHESIA EVENT (OUTPATIENT)
Dept: SURGERY | Facility: CLINIC | Age: 57
End: 2017-03-09
Payer: MEDICARE

## 2017-03-09 VITALS
RESPIRATION RATE: 18 BRPM | DIASTOLIC BLOOD PRESSURE: 75 MMHG | OXYGEN SATURATION: 98 % | BODY MASS INDEX: 32.1 KG/M2 | TEMPERATURE: 98.1 F | SYSTOLIC BLOOD PRESSURE: 113 MMHG | HEIGHT: 61 IN | WEIGHT: 170 LBS

## 2017-03-09 DIAGNOSIS — M48.00 SPINAL STENOSIS: ICD-10-CM

## 2017-03-09 DIAGNOSIS — M54.50 LOW BACK PAIN SYNDROME: ICD-10-CM

## 2017-03-09 DIAGNOSIS — Q76.2 SPONDYLOLISTHESIS, CONGENITAL: ICD-10-CM

## 2017-03-09 PROCEDURE — 40000277 XR SURGERY CARM FLUORO LESS THAN 5 MIN W STILLS: Mod: TC

## 2017-03-09 PROCEDURE — 37000009 ZZH ANESTHESIA TECHNICAL FEE, EACH ADDTL 15 MIN: Performed by: ANESTHESIOLOGY

## 2017-03-09 PROCEDURE — 37000008 ZZH ANESTHESIA TECHNICAL FEE, 1ST 30 MIN: Performed by: ANESTHESIOLOGY

## 2017-03-09 PROCEDURE — 25000125 ZZHC RX 250: Performed by: ANESTHESIOLOGY

## 2017-03-09 PROCEDURE — 25000125 ZZHC RX 250: Performed by: NURSE ANESTHETIST, CERTIFIED REGISTERED

## 2017-03-09 PROCEDURE — 25000128 H RX IP 250 OP 636: Performed by: ANESTHESIOLOGY

## 2017-03-09 PROCEDURE — 64483 NJX AA&/STRD TFRM EPI L/S 1: CPT | Mod: 50 | Performed by: ANESTHESIOLOGY

## 2017-03-09 PROCEDURE — 25800025 ZZH RX 258: Performed by: NURSE ANESTHETIST, CERTIFIED REGISTERED

## 2017-03-09 PROCEDURE — 25500064 ZZH RX 255 OP 636: Performed by: ANESTHESIOLOGY

## 2017-03-09 RX ORDER — SODIUM CHLORIDE, SODIUM LACTATE, POTASSIUM CHLORIDE, CALCIUM CHLORIDE 600; 310; 30; 20 MG/100ML; MG/100ML; MG/100ML; MG/100ML
INJECTION, SOLUTION INTRAVENOUS CONTINUOUS
Status: DISCONTINUED | OUTPATIENT
Start: 2017-03-09 | End: 2017-03-09 | Stop reason: HOSPADM

## 2017-03-09 RX ORDER — LIDOCAINE 40 MG/G
CREAM TOPICAL
Status: DISCONTINUED | OUTPATIENT
Start: 2017-03-09 | End: 2017-03-09 | Stop reason: HOSPADM

## 2017-03-09 RX ORDER — LIDOCAINE HYDROCHLORIDE 20 MG/ML
INJECTION, SOLUTION INFILTRATION; PERINEURAL PRN
Status: DISCONTINUED | OUTPATIENT
Start: 2017-03-09 | End: 2017-03-09

## 2017-03-09 RX ORDER — TRIAMCINOLONE ACETONIDE 40 MG/ML
INJECTION, SUSPENSION INTRA-ARTICULAR; INTRAMUSCULAR PRN
Status: DISCONTINUED | OUTPATIENT
Start: 2017-03-09 | End: 2017-03-09 | Stop reason: HOSPADM

## 2017-03-09 RX ORDER — PROPOFOL 10 MG/ML
INJECTION, EMULSION INTRAVENOUS PRN
Status: DISCONTINUED | OUTPATIENT
Start: 2017-03-09 | End: 2017-03-09

## 2017-03-09 RX ORDER — IOPAMIDOL 612 MG/ML
INJECTION, SOLUTION INTRATHECAL PRN
Status: DISCONTINUED | OUTPATIENT
Start: 2017-03-09 | End: 2017-03-09 | Stop reason: HOSPADM

## 2017-03-09 RX ADMIN — PROPOFOL 50 MG: 10 INJECTION, EMULSION INTRAVENOUS at 09:51

## 2017-03-09 RX ADMIN — SODIUM CHLORIDE, POTASSIUM CHLORIDE, SODIUM LACTATE AND CALCIUM CHLORIDE: 600; 310; 30; 20 INJECTION, SOLUTION INTRAVENOUS at 08:55

## 2017-03-09 RX ADMIN — LIDOCAINE HYDROCHLORIDE 60 MG: 20 INJECTION, SOLUTION INFILTRATION; PERINEURAL at 09:50

## 2017-03-09 RX ADMIN — PROPOFOL 40 MG: 10 INJECTION, EMULSION INTRAVENOUS at 09:56

## 2017-03-09 RX ADMIN — LIDOCAINE HYDROCHLORIDE 1 ML: 10 INJECTION, SOLUTION EPIDURAL; INFILTRATION; INTRACAUDAL; PERINEURAL at 08:55

## 2017-03-09 ASSESSMENT — COPD QUESTIONNAIRES
CAT_SEVERITY: MILD
COPD: 1

## 2017-03-09 ASSESSMENT — LIFESTYLE VARIABLES: TOBACCO_USE: 1

## 2017-03-09 NOTE — OP NOTE
PRIMARY PROBLEM: Low back pain and leg pains    PROCEDURE: Bilateral L2-3  Transforaminal Epidural Steroid Injections with fluoroscopic guidance and contrast.     PROCEDURE DETAILS: After written informed consent was obtained from the patient, the patient was escorted to the procedure room.  The patient was placed in the prone position.  A  time out  was conducted to verify patient identity, procedure to be performed, side, site, allergies and any special requirements.  The skin over the thoracolumbar region was prepped and draped in normal sterile fashion. Fluoroscopy was used to identify the neural foramen in AP view and the skin was anesthetized with 2 mL of 1% lidocaine with bicarbonate buffer. A 25-gauge,   5-inch Quincke spinal needle was advanced through this location and advanced under fluoroscopic guidance towards the neural foramen.  The target zone was the 6 o clock position of the pedicle.   Prior to entering the foramen, the depth of the needle was gauged with a lateral view on fluoroscopy. While still in a lateral view, the needle was slowly advanced to avoid injury to the spinal nerve.  Then, in the oblique view (approximately 28 degrees), after negative aspiration, 1.5 mL of Omnipaque contrast dye was injected revealing epidural spread without evidence of intravascular or intrathecal spread.  Then a 3cc solution of 40 mg of Triamcinolone in 2 mL of  Preservative-Free saline was slowly injected into the epidural space at each segment.  After injection of the medication, as the needle tip was withdrawn, it was flushed with local anesthetic.   The patient was monitored with blood pressure and pulse oximetry machines with the assistance of an RN throughout the procedure.  The patient was alert and responsive to questions throughout the procedure.   The patient tolerated the procedure well and was observed in the post-procedural area.  The patient was dismissed without apparent complications.      DIAGNOSIS:  1. Bilateral lumbar radiculopathy    PLAN:  1. Performed bilateral L2-3  transforaminal epidural steroid injections.  2. The patient was instructed to follow-up in two weeks with a progress update.      Pérez Valle MD  Diplomate of the American Board of Anesthesiology, Pain Medicine

## 2017-03-09 NOTE — IP AVS SNAPSHOT
MRN:[id number]                      After Visit Summary   3/9/2017    Vesta Rodriguez    MRN: [id number]           Thank you!     Thank you for choosing [facility] for your care. Our goal is always to provide you with excellent care. Hearing back from our patients is one way we can continue to improve our services. Please take a few minutes to complete the written survey that you may receive in the mail after you visit with us. Thank you!        Patient Information     Date Of Birth          1960        About your hospital stay     You were admitted on:  March 9, 2017 You last received care in the:  [facility]    You were discharged on:  March 9, 2017       Who to Call     For medical emergencies, please call 911.  For non-urgent questions about your medical care, please call your primary care provider or clinic, [phone]  For questions related to your surgery, please call your surgery clinic        Attending Provider     Provider Specialty    [facility]       Primary Care Provider Office Phone # Fax #    JAVIER Child [phone]       [address] [address]        After Care Instructions     Discharge Instructions       Review outpatient procedure discharge instructions as directed by Provider.                  Further instructions from your care team       Home Care Instructions    [facility]  [phone]               Procedure:  Epidural Spine Injection or Joint injection    Activity:    Rest today    Do not work today    Resume normal activity tomorrow    Pain:    You may experience soreness at the injection site for one or two days    You may use an ice pack for 20 minutes every 2 hours for the first 24 hours    You may use a heating pad after the first 24 hours    You may use Tylenol  (acetaminophen) every 4 hours or other pain medicines as directed by your  physician    Safety  Sedation medicine, if given may remain active for many hours.    It is important for the next 24 hours that you do not:    Drive a car    Operate machines or power tools    Consume alcohol, including beer    Sign any important papers or legal documents    You may experience numbness radiating into your legs or arms, (depending on the procedure location)  This numbness may last several hours.  Until the numb sensation returns to normal please use caution in walking, climbing stairs, stepping out of your vehicle, etc.    Common side effects of steroids:  Not everyone will experience corticosteroid side effects. If side effects are experienced they will gradually subside in the 7-10 day period following an injection.    Most common side effects include:    Flushed face and/or chest    Feeling of warmth, particularly in face but could be overall feeling of warmth    Increased blood sugar in diabetic patients    Menstrual irregularities may occur.  If taking hormone based birth control an alternate method of birth control is recommended    Sleep disturbances and/or mood swings are possible    Leg cramps    Please contact us if you have:  Severe pain   Fever more than 101.5 degrees Fahrenheit  Signs of infection (redness, swelling or drainage)      If you have questions, please contact our office at [phone] between the hours of 8:00am and 5:00pm Monday through Friday.  After office hours you can contact the on call provider by dialing [phone].  If you need immediate attention we recommend that you go to a hospital emergency room or dial [phone].                 Pending Results     No orders found from 3/7/2017 to 3/10/2017.            Admission Information     Date & Time Provider Department Dept. Phone    3/9/2017 Pérez Valle MD [facility] Phase [phone]      Your Vitals Were     Blood Pressure Temperature Respirations Height Weight BMI (Body Mass Index)    112/79 98.1  F  "(36.7  C) (Oral) 18 1.549 m (5' 1\") 77.1 kg (170 lb) 32.12 kg/m2      [employer] Information     [employer] gives you secure access to your electronic health record. If you see a primary care provider, you can also send messages to your care team and make appointments. If you have questions, please call your primary care clinic.  If you do not have a primary care provider, please call [phone] and they will assist you.        Care EveryWhere ID     This is your Care EveryWhere ID. This could be used by other organizations to access your [facility] medical records  [id number]           Review of your medicines      CONTINUE these medicines which have NOT CHANGED        Dose / Directions    AMITRIPTYLINE HCL PO        Dose:  10 mg   Take 10 mg by mouth At Bedtime   Refills:  0       fluticasone-salmeterol 100-50 MCG/DOSE diskus inhaler   Commonly known as:  ADVAIR   Used for:  Chronic obstructive pulmonary disease, unspecified COPD type (H)        Dose:  1 puff   Inhale 1 puff into the lungs 2 times daily   Quantity:  2 Inhaler   Refills:  10       HYDROcodone-acetaminophen 5-325 MG per tablet   Commonly known as:  NORCO   Used for:  Chronic bilateral low back pain with bilateral sciatica        Dose:  1 tablet   Take 1 tablet by mouth every 8 hours as needed for pain   Quantity:  30 tablet   Refills:  0       methimazole 5 MG tablet   Commonly known as:  TAPAZOLE   Used for:  Graves' disease        Dose:  5 mg   Take 1 tablet (5 mg) by mouth daily   Quantity:  90 tablet   Refills:  3       order for DME        Equipment being ordered: CPAP   Refills:  0       order for DME   Used for:  Intervertebral disc prolapse with impingement        Equipment being ordered: Aqua K pad and pump. Diagnosis of progressive back pain with history of L3- sacral fusion, disc extrusion with impingement at L2-3 and L1-2.   Quantity:  1 Device   Refills:  0       pramipexole 1.5 MG tablet   Commonly known as:  MIRAPEX   Used for:  Restless " leg syndrome        Dose:  1.5 mg   Take 1 tablet (1.5 mg) by mouth 2 times daily   Quantity:  180 tablet   Refills:  1       teriparatide (recombinant) 600 MCG/2.4ML Soln injection   Commonly known as:  FORTEO   Used for:  Osteoporosis        Dose:  20 mcg   Inject 0.08 mLs (20 mcg) Subcutaneous daily   Quantity:  1 mL   Refills:  11       VISTARIL 25 MG capsule   Used for:  Graves disease   Generic drug:  hydrOXYzine        Dose:  25 mg   Take 25 mg by mouth 3 times daily as needed for itching   Refills:  0                Protect others around you: Learn how to safely use, store and throw away your medicines at www.disposemymeds.org.             Medication List: This is a list of all your medications and when to take them. Check marks below indicate your daily home schedule. Keep this list as a reference.      Medications           Morning Afternoon Evening Bedtime As Needed    AMITRIPTYLINE HCL PO   Take 10 mg by mouth At Bedtime                                fluticasone-salmeterol 100-50 MCG/DOSE diskus inhaler   Commonly known as:  ADVAIR   Inhale 1 puff into the lungs 2 times daily                                HYDROcodone-acetaminophen 5-325 MG per tablet   Commonly known as:  NORCO   Take 1 tablet by mouth every 8 hours as needed for pain                                methimazole 5 MG tablet   Commonly known as:  TAPAZOLE   Take 1 tablet (5 mg) by mouth daily                                order for DME   Equipment being ordered: CPAP                                order for DME   Equipment being ordered: Aqua K pad and pump. Diagnosis of progressive back pain with history of L3- sacral fusion, disc extrusion with impingement at L2-3 and L1-2.                                pramipexole 1.5 MG tablet   Commonly known as:  MIRAPEX   Take 1 tablet (1.5 mg) by mouth 2 times daily                                teriparatide (recombinant) 600 MCG/2.4ML Soln injection   Commonly known as:  FORTEO   Inject 0.08  mLs (20 mcg) Subcutaneous daily                                VISTARIL 25 MG capsule   Take 25 mg by mouth 3 times daily as needed for itching   Generic drug:  hydrOXYzine

## 2017-03-09 NOTE — H&P (VIEW-ONLY)
[facility]  [address] [facility] [address]  [phone]  Dept: [phone]    PRE-OP EVALUATION:  Today's date: 3/8/2017    Vesta Rodriguez (: 1960) presents for pre-operative evaluation assessment as requested by Pérez Mobley.  She requires evaluation and anesthesia risk assessment prior to undergoing surgery/procedure for treatment of right central disc extrusion L2-3 with impingement, disc extrusion L1-2 .  Proposed procedure: inject Epidural Transforaminal, CAM L/T 5 min Fluoro w Stills.    Date of Surgery/ Procedure: 3/9/2017  Time of Surgery/ Procedure: 9:30  Hospital/Surgical Facility: [facility]  Primary Physician: Lakshmi Khoury  Type of Anesthesia Anticipated: to be determined    Patient has a Health Care Directive or Living Will:  YES brought copy with    1. NO - Do you have a history of heart attack, stroke, stent, bypass or surgery on an artery in the head, neck, heart or legs?  2. NO - Do you ever have any pain or discomfort in your chest?  3. NO - Do you have a history of  Heart Failure?  4. NO - Are you troubled by shortness of breath when: walking on the level, up a slight hill or at night?  5. NO - Do you currently have a cold, bronchitis or other respiratory infection?  6. NO - Do you have a cough, shortness of breath or wheezing?  7. NO - Do you sometimes get pains in the calves of your legs when you walk?  8. NO - Do you or anyone in your family have previous history of blood clots?  9. NO - Do you or does anyone in your family have a serious bleeding problem such as prolonged bleeding following surgeries or cuts?  10. NO - Have you ever had problems with anemia or been told to take iron pills?  11. NO - Have you had any abnormal blood loss such as black, tarry or bloody stools, or abnormal vaginal bleeding?  12. YES - HAVE YOU EVER HAD A BLOOD TRANSFUSION? No complications  13. NO - Have you or any of your relatives ever had problems with  anesthesia?  14. YES - DO YOU HAVE SLEEP APNEA, EXCESSIVE SNORING OR DAYTIME DROWSINESS? Sleep apnea, does not wear cpap.   15. NO - Do you have any prosthetic heart valves?  16. NO - Do you have prosthetic joints?  17. NO - Is there any chance that you may be pregnant?      HPI:                                                      Brief HPI related to upcoming procedure: chronic bilateral low back pain with sciatica, progressing over the past 6 or so months. Past history includes L3 to sacrum anterior-posterior fusion, and L1 kyphoplasty. Scheduled for epidural injection then anticipates surgery in 4-6 weeks      See problem list for active medical problems.  Problems all longstanding and stable, except as noted/documented.  See ROS for pertinent symptoms related to these conditions.                                                                                                    .  COPD - Patient has a longstanding history of moderate-severe COPD . Patient has been doing well overall noting no symptoms  currently and continues on medication regimen consisting of Advair without adverse reactions or side effects.                                                                                                         .  GRAVES DISEASE - Patient has been doing well on Tapazole, noting no tremor, insomnia, hair loss or changes in skin texture. Last TSH value of 0.59. Continues to take medications as directed, without adverse reactions or side effects.                                                                                                                                                                             MEDICAL HISTORY:                                                      Patient Active Problem List    Diagnosis Date Noted     Major depressive disorder, recurrent episode, moderate (H) 10/24/2016     Priority: Medium     S/P cholecystectomy 08/04/2016     Priority: Medium     Graves disease 01/25/2016      Priority: Medium     Alcoholism (H) 11/19/2014     Priority: Medium     Sober since 2014.         H/O vertebroplasty 08/12/2013     Priority: Medium     Thyroid nodule; on outsie dc,due for f/u 5/13 04/19/2013     Priority: Medium     ultrasound may , 2013  Due for followup          Right ureteral stone 10/05/2012     Priority: Medium     8/1612         Chronic cholecystitis 09/04/2012     Priority: Medium     Tobacco user 08/28/2011     Priority: Medium     Family history of breast cancer in mother 08/28/2011     Priority: Medium     Vitamin D deficiency 08/28/2011     Priority: Medium     CARDIOVASCULAR SCREENING; LDL GOAL LESS THAN 130 10/31/2010     Priority: Medium     COPD (chronic obstructive pulmonary disease); spirometry 3/10 ; rare use of mdi 03/15/2010     Priority: Medium     Anxiety 09/26/2008     Priority: Medium     Disturbance in sleep behavior 10/08/2007     Priority: Medium     Has failed mult meds, - lunesta, ambien, klonopin  etoh treatment 2013  Not candidate for klonopin      Problem list name updated by automated process. Provider to review       Displacement of cervical intervertebral disc without myelopathy 10/08/2007     Priority: Medium     Cervical fusion. [facility].     2004       Restless legs syndrome (RLS) 07/30/2007     Priority: Medium     On mirapex, unable to go off     0.5 mg twice a day , for rls        Displacement of lumbar intervertebral disc without myelopathy 02/20/2006     Priority: Medium     2 disc fusion        Brachial neuritis or radiculitis 07/06/2005     Priority: Medium     Problem list name updated by automated process. Provider to review       Osteoporosis 07/06/2005     Priority: Medium     Hip fracture after bone graft.     forteo 3/09 - until summer 2010  Problem list name updated by automated process. Provider to review        Past Medical History   Diagnosis Date     Anemia      Anemia due to blood loss, acute 10/21/2012     Anxiety      Back pain       Chronic cholecystitis      Degeneration of cervical intervertebral disc      04     Degeneration of lumbar or lumbosacral intervertebral disc      Major depression in remission (H)      Osteoporosis      Right ureteral stone      RLS (restless legs syndrome)      Sleep disturbance      UTI (urinary tract infection)      RECENT- ON ANTIBIOTIC, STILL BACTERIA IN URINE     Vitamin D deficiency      Past Surgical History   Procedure Laterality Date     Surgical history of -         section     Surgical history of -        Tubal ligation     Surgical history of -        Post hip fracture     Surgical history of -        hardware removal, right iliac crest      Hysterectomy, traci       no oophorectomy     Laparoscopic cholecystectomy  2012     Procedure: LAPAROSCOPIC CHOLECYSTECTOMY;  Laparoscopic vs Open Cholecystectomy;  Surgeon: Esperanza Quinn MD;  Location: [facility] OR     Fusion cervical anterior two levels       C 5-7     Fusion lumbar anterior three+ levels       L3- SI     Laser holmium lithotripsy ureter(s), insert stent, combined  2012     Procedure: COMBINED CYSTOSCOPY, URETEROSCOPY, LASER HOLMIUM LITHOTRIPSY URETER(S), INSERT STENT;  Cystoscopy, Right Ureteroscopy Holmium Laser Lithotripsy , right ureteral stent placement *Latex Safe*;  Surgeon: Joshua Rm MD;  Location: [facility] OR     Vertebroplasty       Current Outpatient Prescriptions   Medication Sig Dispense Refill     order for DME Equipment being ordered: Aqua K pad. Both pump and pad 1 Units 0     HYDROcodone-acetaminophen (NORCO) 5-325 MG per tablet Take 1 tablet by mouth every 8 hours as needed for pain 30 tablet 0     pramipexole (MIRAPEX) 1.5 MG tablet Take 1 tablet (1.5 mg) by mouth 2 times daily 180 tablet 1     teriparatide, recombinant, (FORTEO) 600 MCG/2.4ML SOLN injection Inject 0.08 mLs (20 mcg) Subcutaneous daily 1 mL 11     methimazole (TAPAZOLE) 5 MG tablet Take 1 tablet (5 mg) by mouth  daily 90 tablet 3     hydrOXYzine (VISTARIL) 25 MG capsule Take 25 mg by mouth 3 times daily as needed for itching       fluticasone-salmeterol (ADVAIR) 100-50 MCG/DOSE diskus inhaler Inhale 1 puff into the lungs 2 times daily 2 Inhaler 10     order for DME Equipment being ordered: CPAP       OTC products: None, except as noted above    Allergies   Allergen Reactions     Ambien [Zolpidem Tartrate]      Mental status changes     Asa [Aspirin]      Accidental overdose.     Celexa [Citalopram Hydrobromide]      review with pt., i cant' find side effect in chart. 2/9/06 Sylvie Mederos MD       Doxychel [Doxycycline Hyclate]      Gabapentin      Rectalurgency      Tape [Adhesive Tape]      Zoloft      Wt gain       Latex Allergy: no, but allergic to adhesive tape, must use paper tape    Social History   Substance Use Topics     Smoking status: Current Every Day Smoker     Packs/day: 1.00     Types: Cigarettes     Smokeless tobacco: Never Used     Alcohol use No     History   Drug Use No       REVIEW OF SYSTEMS:                                                    C: NEGATIVE for fever, chills, change in weight  INTEGUMENTARY/SKIN: concerned about lump on upper back, midline at base of neck  E: NEGATIVE for vision changes or irritation  E/M: NEGATIVE for ear, mouth and throat problems  R: NEGATIVE for significant cough or SOB  CV: NEGATIVE for chest pain, palpitations or peripheral edema  GI: NEGATIVE for nausea, abdominal pain, heartburn, or change in bowel habits  : NEGATIVE for frequency, dysuria, or hematuria  MUSCULOSKELETAL:back pain  N: NEGATIVE for weakness, dizziness or paresthesias  P: NEGATIVE for changes in mood or affect    EXAM:                                                    There were no vitals taken for this visit.    GENERAL APPEARANCE: healthy, alert and no distress     EYES: Eyes grossly normal to inspection and conjunctivae and sclerae normal     NECK: no adenopathy, no asymmetry, masses, or scars  and thyroid normal to palpation     RESP: lungs clear to auscultation - no rales, rhonchi or wheezes     CV: regular rates and rhythm, normal S1 S2, no S3 or S4 and no murmur, click or rub     MS: extremities normal- no gross deformities noted, no evidence of inflammation in joints, FROM in all extremities.     SKIN: 1 cm slightly raised nodule at posterior base of neck, mobile, soft. Seems consistent with fatty lipoma     NEURO: Normal strength and tone, sensory exam grossly normal, mentation intact and speech normal     PSYCH: mentation appears normal. and affect normal/bright     LYMPHATICS: normal ant/post cervical, supraclavicular nodes    DIAGNOSTICS:                                                    No labs or EKG required for low risk surgery (cataract, skin procedure, breast biopsy, etc)    Recent Labs   Lab Test  08/04/16   1222  03/07/16   0954  11/23/15   1506   03/12/12   1105   HGB   --   13.5  13.1   < >   --    PLT   --   248  254   < >   --    NA  141  140   --    < >   --    POTASSIUM  3.4  3.8   --    < >   --    CR  0.68  0.62   --    < >   --    A1C   --    --    --    --   5.8    < > = values in this interval not displayed.        IMPRESSION:                                                    Reason for surgery/procedure: porgressive low back pain with sciatica    The proposed surgical procedure is considered LOW risk.    REVISED CARDIAC RISK INDEX  The patient has the following serious cardiovascular risks for perioperative complications such as (MI, PE, VFib and 3  AV Block):  No serious cardiac risks  INTERPRETATION: 0 risks: Class I (very low risk - 0.4% complication rate)    The patient has the following additional risks for perioperative complications:  No identified additional risks      ICD-10-CM    1. Intervertebral disc prolapse with impingement M51.9    2. Disc disorder of lumbar region M51.9      3. Graves Disease- stable  4. COPD-stable on Advair    RECOMMENDATIONS:                                                         Obstructive Sleep Apnea (or suspected sleep apnea)  Patient does not use cpap at home      --Patient is to take all scheduled medications on the day of surgery      APPROVAL GIVEN to proceed with proposed procedure, without further diagnostic evaluation       Signed Electronically by: Tammy Tyler NP, APRN CNP    Copy of this evaluation report is provided to requesting physician.    [facility] Preop Guidelines

## 2017-03-09 NOTE — IP AVS SNAPSHOT
[facility]    [address] [facility] [address]     [place] [address]    Phone:  [phone]                                       After Visit Summary   3/9/2017    Vesta Rodriguez    MRN: [id number]           After Visit Summary Signature Page     I have received my discharge instructions, and my questions have been answered. I have discussed any challenges I see with this plan with the nurse or doctor.    ..........................................................................................................................................  Patient/Patient Representative Signature      ..........................................................................................................................................  Patient Representative Print Name and Relationship to Patient    ..................................................               ................................................  Date                                            Time    ..........................................................................................................................................  Reviewed by Signature/Title    ...................................................              ..............................................  Date                                                            Time

## 2017-03-09 NOTE — DISCHARGE INSTRUCTIONS
Home Care Instructions    [facility]  [phone]               Procedure:  Epidural Spine Injection or Joint injection    Activity:    Rest today    Do not work today    Resume normal activity tomorrow    Pain:    You may experience soreness at the injection site for one or two days    You may use an ice pack for 20 minutes every 2 hours for the first 24 hours    You may use a heating pad after the first 24 hours    You may use Tylenol  (acetaminophen) every 4 hours or other pain medicines as directed by your physician    Safety  Sedation medicine, if given may remain active for many hours.    It is important for the next 24 hours that you do not:    Drive a car    Operate machines or power tools    Consume alcohol, including beer    Sign any important papers or legal documents    You may experience numbness radiating into your legs or arms, (depending on the procedure location)  This numbness may last several hours.  Until the numb sensation returns to normal please use caution in walking, climbing stairs, stepping out of your vehicle, etc.    Common side effects of steroids:  Not everyone will experience corticosteroid side effects. If side effects are experienced they will gradually subside in the 7-10 day period following an injection.    Most common side effects include:    Flushed face and/or chest    Feeling of warmth, particularly in face but could be overall feeling of warmth    Increased blood sugar in diabetic patients    Menstrual irregularities may occur.  If taking hormone based birth control an alternate method of birth control is recommended    Sleep disturbances and/or mood swings are possible    Leg cramps    Please contact us if you have:  Severe pain   Fever more than 101.5 degrees Fahrenheit  Signs of infection (redness, swelling or drainage)      If you have questions, please contact our office at [phone] between the hours of 8:00am and 5:00pm Monday through Friday.  After  office hours you can contact the on call provider by dialing [phone].  If you need immediate attention we recommend that you go to a hospital emergency room or dial [phone].

## 2017-03-09 NOTE — ANESTHESIA POSTPROCEDURE EVALUATION
Patient: Vesta A Michael    Procedure(s):  Transforaminal epidural steroid injection Lumbar 2-3 - Wound Class: I-Clean    Diagnosis:spondylolisthesis congenital, back pain syndrome, spinal stenosis lumbar 2-3  Diagnosis Additional Information: No value filed.    Anesthesia Type:  MAC    Note:  Anesthesia Post Evaluation    Patient location during evaluation: Phase 2  Patient participation: Able to fully participate in evaluation  Level of consciousness: awake and alert  Pain management: adequate  Airway patency: patent  Cardiovascular status: blood pressure returned to baseline  Respiratory status: spontaneous ventilation and room air  Hydration status: balanced  PONV: none     Anesthetic complications: None    Comments: Patient resting without complaint at this time. Denies any anesthesia concerns        Last vitals:  Vitals:    03/09/17 0843   BP: 112/79   Resp: 18   Temp: 98.1  F (36.7  C)         Electronically Signed By: JAVIER Schultz CRNA  March 9, 2017  10:15 AM

## 2017-03-09 NOTE — ANESTHESIA PREPROCEDURE EVALUATION
Anesthesia Evaluation     . Pt has had prior anesthetic. Type: General and MAC      ROS/MED HX    ENT/Pulmonary:     (+)tobacco use, Current use mild COPD, , . .    Neurologic:     (+)other neuro restless leg syndrome    Cardiovascular:         METS/Exercise Tolerance:     Hematologic:         Musculoskeletal:   (+) arthritis, , , other musculoskeletal- lumbar disc displacement/chronic low back pain      GI/Hepatic:         Renal/Genitourinary:         Endo:     (+) thyroid problem  Thyroid disease - Other nodules, .      Psychiatric:     (+) psychiatric history anxiety and depression      Infectious Disease:  - neg infectious disease ROS       Malignancy:      - no malignancy   Other:    (+) No chance of pregnancy C-spine cleared: N/A, H/O Chronic Pain,H/O chronic opiod use ,              Physical Exam  Normal systems: cardiovascular and dental    Airway   Mallampati: II  TM distance: >3 FB  Neck ROM: full    Dental     Cardiovascular   Rhythm and rate: regular and normal      Pulmonary (+) decreased breath sounds                       Anesthesia Plan      History & Physical Review  History and physical reviewed and following examination; no interval change.    ASA Status:  3 .    NPO Status:  > 8 hours    Plan for MAC Reason for MAC:  Deep or markedly invasive procedure (G8) and Extreme anxiety (QS)  PONV prophylaxis:  Ondansetron (or other 5HT-3)       Postoperative Care  Postoperative pain management:  Oral pain medications.      Consents  Anesthetic plan, risks, benefits and alternatives discussed with:  Patient.  Use of blood products discussed: No .   .                          .

## 2017-04-04 ENCOUNTER — TRANSFERRED RECORDS (OUTPATIENT)
Dept: HEALTH INFORMATION MANAGEMENT | Facility: CLINIC | Age: 57
End: 2017-04-04

## 2017-04-10 DIAGNOSIS — M81.0 OSTEOPOROSIS: ICD-10-CM

## 2017-04-10 NOTE — TELEPHONE ENCOUNTER
"teriparatide, recombinant, (FORTEO) 600MCG/2.4ml SOLN injection    Last Written Prescription Date: 10/26/2016  Last Fill Quantity: 1 ml, # refills: 11  Last Office Visit with [facility] or [facility] prescribing provider: 03/08/2017       DEXA Scan:  Last order of DX HIP/PELVIS/SPINE was found on 2/24/2015 from Hospital Encounter on 2/24/2015     No order of DX HIP/PELVIS/SPINE W LAT FRACTION ANALYSIS is found.       Creatinine   Date Value Ref [facility] Status   08/04/2016 0.68 0.52 - 1.04 mg/dL Final     Note from Pharmacy \"can we get needles for this Rx- BD pen Needles Mini  Thanks\"  "

## 2017-04-24 ENCOUNTER — MYC MEDICAL ADVICE (OUTPATIENT)
Dept: FAMILY MEDICINE | Facility: CLINIC | Age: 57
End: 2017-04-24

## 2017-04-25 ENCOUNTER — MYC MEDICAL ADVICE (OUTPATIENT)
Dept: FAMILY MEDICINE | Facility: CLINIC | Age: 57
End: 2017-04-25

## 2017-04-25 DIAGNOSIS — M54.50 LOW BACK PAIN: Primary | ICD-10-CM

## 2017-04-26 RX ORDER — CYCLOBENZAPRINE HCL 10 MG
5-10 TABLET ORAL 3 TIMES DAILY PRN
Qty: 30 TABLET | Refills: 1 | Status: SHIPPED | OUTPATIENT
Start: 2017-04-26 | End: 2017-07-25

## 2017-05-08 ENCOUNTER — RADIANT APPOINTMENT (OUTPATIENT)
Dept: GENERAL RADIOLOGY | Facility: CLINIC | Age: 57
End: 2017-05-08
Attending: NURSE PRACTITIONER
Payer: MEDICARE

## 2017-05-08 ENCOUNTER — RECORDS - HEALTHEAST (OUTPATIENT)
Dept: ADMINISTRATIVE | Facility: OTHER | Age: 57
End: 2017-05-08

## 2017-05-08 ENCOUNTER — OFFICE VISIT (OUTPATIENT)
Dept: FAMILY MEDICINE | Facility: CLINIC | Age: 57
End: 2017-05-08
Payer: MEDICARE

## 2017-05-08 ENCOUNTER — MYC MEDICAL ADVICE (OUTPATIENT)
Dept: FAMILY MEDICINE | Facility: CLINIC | Age: 57
End: 2017-05-08

## 2017-05-08 VITALS
HEART RATE: 86 BPM | OXYGEN SATURATION: 98 % | WEIGHT: 168.6 LBS | DIASTOLIC BLOOD PRESSURE: 60 MMHG | SYSTOLIC BLOOD PRESSURE: 114 MMHG | RESPIRATION RATE: 18 BRPM | TEMPERATURE: 97.7 F | BODY MASS INDEX: 31.86 KG/M2

## 2017-05-08 DIAGNOSIS — Z01.818 PREOP GENERAL PHYSICAL EXAM: Primary | ICD-10-CM

## 2017-05-08 DIAGNOSIS — E05.00 GRAVES DISEASE: ICD-10-CM

## 2017-05-08 DIAGNOSIS — Z72.0 TOBACCO USER: ICD-10-CM

## 2017-05-08 DIAGNOSIS — Z01.818 PREOP GENERAL PHYSICAL EXAM: ICD-10-CM

## 2017-05-08 DIAGNOSIS — D50.9 IRON DEFICIENCY ANEMIA, UNSPECIFIED IRON DEFICIENCY ANEMIA TYPE: ICD-10-CM

## 2017-05-08 LAB
ALBUMIN SERPL-MCNC: 3.6 G/DL (ref 3.4–5)
ALBUMIN UR-MCNC: NEGATIVE MG/DL
ALP SERPL-CCNC: 81 U/L (ref 40–150)
ALT SERPL W P-5'-P-CCNC: 18 U/L (ref 0–50)
ANION GAP SERPL CALCULATED.3IONS-SCNC: 7 MMOL/L (ref 3–14)
APPEARANCE UR: CLEAR
AST SERPL W P-5'-P-CCNC: 12 U/L (ref 0–45)
BACTERIA #/AREA URNS HPF: ABNORMAL /HPF
BASOPHILS # BLD AUTO: 0.1 10E9/L (ref 0–0.2)
BASOPHILS NFR BLD AUTO: 1 %
BILIRUB SERPL-MCNC: 0.3 MG/DL (ref 0.2–1.3)
BILIRUB UR QL STRIP: NEGATIVE
BUN SERPL-MCNC: 12 MG/DL (ref 7–30)
CALCIUM SERPL-MCNC: 8.7 MG/DL (ref 8.5–10.1)
CHLORIDE SERPL-SCNC: 104 MMOL/L (ref 94–109)
CO2 SERPL-SCNC: 27 MMOL/L (ref 20–32)
COLOR UR AUTO: YELLOW
CREAT SERPL-MCNC: 0.7 MG/DL (ref 0.52–1.04)
DIFFERENTIAL METHOD BLD: NORMAL
EOSINOPHIL # BLD AUTO: 0.2 10E9/L (ref 0–0.7)
EOSINOPHIL NFR BLD AUTO: 2.1 %
ERYTHROCYTE [DISTWIDTH] IN BLOOD BY AUTOMATED COUNT: 14.4 % (ref 10–15)
GFR SERPL CREATININE-BSD FRML MDRD: 87 ML/MIN/1.7M2
GLUCOSE SERPL-MCNC: 84 MG/DL (ref 70–99)
GLUCOSE UR STRIP-MCNC: NEGATIVE MG/DL
HCT VFR BLD AUTO: 38.7 % (ref 35–47)
HGB BLD-MCNC: 13 G/DL (ref 11.7–15.7)
HGB UR QL STRIP: NEGATIVE
INR PPP: 0.92 (ref 0.86–1.14)
KETONES UR STRIP-MCNC: NEGATIVE MG/DL
LEUKOCYTE ESTERASE UR QL STRIP: ABNORMAL
LYMPHOCYTES # BLD AUTO: 2.5 10E9/L (ref 0.8–5.3)
LYMPHOCYTES NFR BLD AUTO: 32.1 %
MCH RBC QN AUTO: 30 PG (ref 26.5–33)
MCHC RBC AUTO-ENTMCNC: 33.6 G/DL (ref 31.5–36.5)
MCV RBC AUTO: 89 FL (ref 78–100)
MONOCYTES # BLD AUTO: 0.4 10E9/L (ref 0–1.3)
MONOCYTES NFR BLD AUTO: 4.9 %
NEUTROPHILS # BLD AUTO: 4.7 10E9/L (ref 1.6–8.3)
NEUTROPHILS NFR BLD AUTO: 59.9 %
NITRATE UR QL: NEGATIVE
NON-SQ EPI CELLS #/AREA URNS LPF: ABNORMAL /LPF
PH UR STRIP: 7 PH (ref 5–7)
PLATELET # BLD AUTO: 270 10E9/L (ref 150–450)
POTASSIUM SERPL-SCNC: 3.8 MMOL/L (ref 3.4–5.3)
PROT SERPL-MCNC: 7.5 G/DL (ref 6.8–8.8)
RBC # BLD AUTO: 4.34 10E12/L (ref 3.8–5.2)
RBC #/AREA URNS AUTO: ABNORMAL /HPF (ref 0–2)
SODIUM SERPL-SCNC: 138 MMOL/L (ref 133–144)
SP GR UR STRIP: 1.01 (ref 1–1.03)
TSH SERPL DL<=0.005 MIU/L-ACNC: 0.85 MU/L (ref 0.4–4)
URN SPEC COLLECT METH UR: ABNORMAL
UROBILINOGEN UR STRIP-ACNC: 0.2 EU/DL (ref 0.2–1)
WBC # BLD AUTO: 7.8 10E9/L (ref 4–11)
WBC #/AREA URNS AUTO: ABNORMAL /HPF (ref 0–2)

## 2017-05-08 PROCEDURE — 84443 ASSAY THYROID STIM HORMONE: CPT | Performed by: NURSE PRACTITIONER

## 2017-05-08 PROCEDURE — 36415 COLL VENOUS BLD VENIPUNCTURE: CPT | Performed by: NURSE PRACTITIONER

## 2017-05-08 PROCEDURE — 81001 URINALYSIS AUTO W/SCOPE: CPT | Performed by: NURSE PRACTITIONER

## 2017-05-08 PROCEDURE — 85610 PROTHROMBIN TIME: CPT | Performed by: NURSE PRACTITIONER

## 2017-05-08 PROCEDURE — 80053 COMPREHEN METABOLIC PANEL: CPT | Performed by: NURSE PRACTITIONER

## 2017-05-08 PROCEDURE — 71020 XR CHEST 2 VW: CPT

## 2017-05-08 PROCEDURE — 99214 OFFICE O/P EST MOD 30 MIN: CPT | Performed by: NURSE PRACTITIONER

## 2017-05-08 PROCEDURE — 93000 ELECTROCARDIOGRAM COMPLETE: CPT | Performed by: NURSE PRACTITIONER

## 2017-05-08 PROCEDURE — 85025 COMPLETE CBC W/AUTO DIFF WBC: CPT | Performed by: NURSE PRACTITIONER

## 2017-05-08 NOTE — NURSING NOTE
"Chief Complaint   Patient presents with     Pre-Op Exam       Initial There were no vitals taken for this visit. Estimated body mass index is 32.12 kg/(m^2) as calculated from the following:    Height as of 3/9/17: 5' 1\" (1.549 m).    Weight as of 3/9/17: 170 lb (77.1 kg).  Medication Reconciliation: complete  "

## 2017-05-08 NOTE — PROGRESS NOTES
[facility]  [address] [facility] [address]  [phone]  Dept: [phone]    PRE-OP EVALUATION:  Today's date: 2017    Vesta Rodriguez (: 1960) presents for pre-operative evaluation assessment as requested by Dr. Alonso.  She requires evaluation and anesthesia risk assessment prior to undergoing surgery/procedure for treatment of lumbar spondylosis and compression .  Proposed procedure: spinal fusions    Date of Surgery/ Procedure: 2017 and 2017  Time of Surgery/ Procedure: 10:00 am  Hospital/Surgical Facility: [facility]  Fax number for surgical facility: [phone]    [facility] fax: [phone]  Primary Physician: Lakshmi Khoury  Type of Anesthesia Anticipated: General    Patient has a Health Care Directive or Living Will:  YES     1. NO - Do you have a history of heart attack, stroke, stent, bypass or surgery on an artery in the head, neck, heart or legs?  2. NO - Do you ever have any pain or discomfort in your chest?  3. NO - Do you have a history of  Heart Failure?  4. YES - ARE YOUR TROUBLED BY SHORTNESS OF BREATH WHEN WALKING ON THE LEVEL, UP A SLIGHT HILL OR AT NIGHT? When walking up stairs gets shortness of breath. Activity has been limited due to pain. Also a smoker.  5. NO - Do you currently have a cold, bronchitis or other respiratory infection?  6. YES - DO YOU HAVE A COUGH, SHORTNESS OF BREATH OR WHEEZING? smoker  7. YES - DO YOU SOMETIMES GET PAINS IN THE CALVES OF YOUR LEGS WHEN YOU WALK?   8. NO - Do you or anyone in your family have previous history of blood clots?  9. NO - Do you or does anyone in your family have a serious bleeding problem such as prolonged bleeding following surgeries or cuts?  10. YES - Have you ever had problems with anemia or been told to take iron pills? Prior to , was taking iron supplements  11. YES - Have you had any abnormal blood loss such as black, tarry or bloody  stools, or abnormal vaginal bleeding?   12. YES - HAVE YOU EVER HAD A BLOOD TRANSFUSION? Bleeding esophageal ulcers in 2012 requiring blood transfusion No complications  13. NO - Have you or any of your relatives ever had problems with anesthesia?  14. YES - DO YOU HAVE SLEEP APNEA, EXCESSIVE SNORING OR DAYTIME DROWSINESS? Sleep apnea, does not use cpap  15. NO - Do you have any prosthetic heart valves?  16. NO - Do you have prosthetic joints?  17. NO - Is there any chance that you may be pregnant?      HPI:                                                      Brief HPI related to upcoming procedure: chronic bilateral low back pain with sciatica, progressing over the past 6 or so months. Past history includes L3 to sacrum anterior-posterior fusion, and L1 kyphoplasty. Has had significant pain radiating up the cervical spine. Scheduled for spinal fusion for treatment of lumbar spondylosis and compression.    Long history of issues with spine and has had the following procedures:  transforaminal Epidural steroid injection at left 2-3, Vertebroplasty in 2013,  anterior cervical fusions at C5-7 in 2004 and anterior lumbar fusions at L3-S1 n 2006.    GRAVES DISEASE - Patient has been doing well on Tapazole, noting no tremor, insomnia, hair loss or changes in skin texture. Last TSH value of 0.85. Continues to take medications as directed, without adverse reactions or side effects.     COPD - Patient has a longstanding history of moderate-severe COPD . Patient has been doing well overall noting SOB when walking up an incline.  She attributes this to decreased exercise related to pain.  She is also a smoker. Continues on medication regimen consisting of Advair without adverse reactions or side effects.          ANEMIA- patient on Forteo 20  g subcutaneous daily.  Last hemoglobin 13.0                                                                                               .    MEDICAL HISTORY:                                                       Patient Active Problem List    Diagnosis Date Noted     Advanced directives, counseling/discussion 03/08/2017     Priority: Medium     Advance Care Planning 3/8/2017: ACP Review of Chart / Resources Provided:  Reviewed chart for advance care plan.  Vesta KAYCEE Rodriguez has been provided information and resources to begin or update their advance care plan.  Added by Vesta Bradley           Major depressive disorder, recurrent episode, moderate (H) 10/24/2016     Priority: Medium     S/P cholecystectomy 08/04/2016     Priority: Medium     Graves disease 01/25/2016     Priority: Medium     Alcoholism (H) 11/19/2014     Priority: Medium     Sober since 2014.         H/O vertebroplasty 08/12/2013     Priority: Medium     Thyroid nodule; on outsie dc,due for f/u 5/13 04/19/2013     Priority: Medium     ultrasound may , 2013  Due for followup          Right ureteral stone 10/05/2012     Priority: Medium     8/1612         Chronic cholecystitis 09/04/2012     Priority: Medium     Tobacco user 08/28/2011     Priority: Medium     Family history of breast cancer in mother 08/28/2011     Priority: Medium     Vitamin D deficiency 08/28/2011     Priority: Medium     CARDIOVASCULAR SCREENING; LDL GOAL LESS THAN 130 10/31/2010     Priority: Medium     COPD (chronic obstructive pulmonary disease); spirometry 3/10 ; rare use of mdi 03/15/2010     Priority: Medium     Anxiety 09/26/2008     Priority: Medium     Disturbance in sleep behavior 10/08/2007     Priority: Medium     Has failed mult meds, - lunesta, ambien, klonopin  etoh treatment 2013  Not candidate for klonopin      Problem list name updated by automated process. Provider to review       Displacement of cervical intervertebral disc without myelopathy 10/08/2007     Priority: Medium     Cervical fusion. [facility].     2004       Restless legs syndrome (RLS) 07/30/2007     Priority: Medium     On mirapex, unable to go off     0.5 mg twice a day ,  for rls        Displacement of lumbar intervertebral disc without myelopathy 2006     Priority: Medium     2 disc fusion        Brachial neuritis or radiculitis 2005     Priority: Medium     Problem list name updated by automated process. Provider to review       Osteoporosis 2005     Priority: Medium     Hip fracture after bone graft.     forteo 3/09 - until summer 2010  Problem list name updated by automated process. Provider to review        Past Medical History:   Diagnosis Date     Anemia      Anemia due to blood loss, acute 10/21/2012     Anxiety      Back pain      Chronic cholecystitis      Degeneration of cervical intervertebral disc     04     Degeneration of lumbar or lumbosacral intervertebral disc      Major depression in remission (H)      Osteoporosis      Right ureteral stone      RLS (restless legs syndrome)      Sleep disturbance      UTI (urinary tract infection)     RECENT- ON ANTIBIOTIC, STILL BACTERIA IN URINE     Vitamin D deficiency      Past Surgical History:   Procedure Laterality Date     FUSION CERVICAL ANTERIOR TWO LEVELS      C 5-7     FUSION LUMBAR ANTERIOR THREE+ LEVELS      L3- SI     HYSTERECTOMY, DEYSI      no oophorectomy     INJECT EPIDURAL TRANSFORAMINAL Bilateral 3/9/2017    Procedure: INJECT EPIDURAL TRANSFORAMINAL;  Surgeon: Pérez Valle MD;  Location: [facility] OR     LAPAROSCOPIC CHOLECYSTECTOMY  2012    Procedure: LAPAROSCOPIC CHOLECYSTECTOMY;  Laparoscopic vs Open Cholecystectomy;  Surgeon: Esperanza Quinn MD;  Location: [facility]     LASER HOLMIUM LITHOTRIPSY URETER(S), INSERT STENT, COMBINED  2012    Procedure: COMBINED CYSTOSCOPY, URETEROSCOPY, LASER HOLMIUM LITHOTRIPSY URETER(S), INSERT STENT;  Cystoscopy, Right Ureteroscopy Holmium Laser Lithotripsy , right ureteral stent placement *Latex Safe*;  Surgeon: Joshua Rm MD;  Location: [facility] OR     SURGICAL HISTORY OF -        section     SURGICAL HISTORY OF -        Tubal ligation     SURGICAL HISTORY OF -       Post hip fracture     SURGICAL HISTORY OF -   6/05    hardware removal, right iliac crest      VERTEBROPLASTY  2013     Current Outpatient Prescriptions   Medication Sig Dispense Refill     cyclobenzaprine (FLEXERIL) 10 MG tablet Take 0.5-1 tablets (5-10 mg) by mouth 3 times daily as needed for muscle spasms 30 tablet 1     teriparatide, recombinant, (FORTEO) 600 MCG/2.4ML SOLN injection Inject 0.08 mLs (20 mcg) Subcutaneous daily 1 mL 11     insulin pen needle (B-D U/F) 31G X 5 MM Use 1 daily or as directed. 100 each 3     AMITRIPTYLINE HCL PO Take 10 mg by mouth At Bedtime       order for DME Equipment being ordered: Aqua K pad and pump. Diagnosis of progressive back pain with history of L3- sacral fusion, disc extrusion with impingement at L2-3 and L1-2. 1 Device 0     HYDROcodone-acetaminophen (NORCO) 5-325 MG per tablet Take 1 tablet by mouth every 8 hours as needed for pain 30 tablet 0     pramipexole (MIRAPEX) 1.5 MG tablet Take 1 tablet (1.5 mg) by mouth 2 times daily 180 tablet 1     methimazole (TAPAZOLE) 5 MG tablet Take 1 tablet (5 mg) by mouth daily 90 tablet 3     hydrOXYzine (VISTARIL) 25 MG capsule Take 25 mg by mouth 3 times daily as needed for itching       fluticasone-salmeterol (ADVAIR) 100-50 MCG/DOSE diskus inhaler Inhale 1 puff into the lungs 2 times daily 2 Inhaler 10     order for DME Equipment being ordered: CPAP       OTC products: None, except as noted above    Allergies   Allergen Reactions     Ambien [Zolpidem Tartrate]      Mental status changes     Asa [Aspirin]      Accidental overdose.     Celexa [Citalopram Hydrobromide]      review with pt., i cant' find side effect in chart. 2/9/06 Sylvie Mederos MD       Doxychel [Doxycycline Hyclate]      Gabapentin      Rectalurgency      Tape [Adhesive Tape]      Zoloft      Wt gain       Latex Allergy: NO    Social History   Substance Use Topics     Smoking status: Current Every Day Smoker      Packs/day: 1.00     Types: Cigarettes     Smokeless tobacco: Never Used     Alcohol use No     History   Drug Use No       REVIEW OF SYSTEMS:                                                    CONSTITUTIONAL:weight gain  INTEGUMENTARY/SKIN: negative  E: NEGATIVE for vision changes or irritation  E/M: NEGATIVE for ear, mouth and throat problems  RESP:Hx COPD, SOB/dyspnea when walking up inclines and smoking  CV: NEGATIVE for chest pain, palpitations or peripheral edema  GI: NEGATIVE for nausea, abdominal pain, heartburn, or change in bowel habits   female: negative  M: NEGATIVE for significant arthralgias or myalgia  N: NEGATIVE for weakness, dizziness or paresthesias  E: NEGATIVE for temperature intolerance, skin/hair changes  H: NEGATIVE for bleeding problems  P: NEGATIVE for changes in mood or affect    EXAM:                                                    There were no vitals taken for this visit.    GENERAL APPEARANCE: healthy, alert and no distress     EYES: Eyes grossly normal to inspection, conjunctivae and sclerae normal and lids and lashes normal     HENT: ear canals and TM's normal and nose and mouth without ulcers or lesions     NECK: no adenopathy, no asymmetry, masses, or scars and thyroid normal to palpation     RESP: lungs clear to auscultation - no rales, rhonchi or wheezes     CV: regular rates and rhythm, normal S1 S2, no S3 or S4 and no murmur, click or rub     ABDOMEN:  soft, nontender, no HSM or masses and bowel sounds normal     MS: paraspinal tenderness in lumbar region.  Difficult for patient to rise from sit to standing due to pain.  Gait is slow     SKIN: no suspicious lesions or rashes     NEURO: Normal strength and tone, sensory exam grossly normal, mentation intact and speech normal     PSYCH: mentation appears normal. and affect normal/bright     LYMPHATICS: normal ant/post cervical, supraclavicular nodes    DIAGNOSTICS:                                                      Labs  Resulted Today:   Results for orders placed or performed in visit on 05/08/17   CBC with platelets differential   Result Value Ref [facility]    WBC 7.8 4.0 - 11.0 10e9/L    RBC Count 4.34 3.8 - 5.2 10e12/L    Hemoglobin 13.0 11.7 - 15.7 g/dL    Hematocrit 38.7 35.0 - 47.0 %    MCV 89 78 - 100 fl    MCH 30.0 26.5 - 33.0 pg    MCHC 33.6 31.5 - 36.5 g/dL    RDW 14.4 10.0 - 15.0 %    Platelet Count 270 150 - 450 10e9/L    Diff Method Automated Method     % Neutrophils 59.9 %    % Lymphocytes 32.1 %    % Monocytes 4.9 %    % Eosinophils 2.1 %    % Basophils 1.0 %    Absolute Neutrophil 4.7 1.6 - 8.3 10e9/L    Absolute Lymphocytes 2.5 0.8 - 5.3 10e9/L    Absolute Monocytes 0.4 0.0 - 1.3 10e9/L    Absolute Eosinophils 0.2 0.0 - 0.7 10e9/L    Absolute Basophils 0.1 0.0 - 0.2 10e9/L   Comprehensive metabolic panel (BMP + Alb, Alk Phos, ALT, AST, Total. Bili, TP)   Result Value Ref [facility]    Sodium 138 133 - 144 mmol/L    Potassium 3.8 3.4 - 5.3 mmol/L    Chloride 104 94 - 109 mmol/L    Carbon Dioxide 27 20 - 32 mmol/L    Anion Gap 7 3 - 14 mmol/L    Glucose 84 70 - 99 mg/dL    Urea Nitrogen 12 7 - 30 mg/dL    Creatinine 0.70 0.52 - 1.04 mg/dL    GFR Estimate 87 >60 mL/min/1.7m2    GFR Estimate If Black >90   GFR Calc   >60 mL/min/1.7m2    Calcium 8.7 8.5 - 10.1 mg/dL    Bilirubin Total 0.3 0.2 - 1.3 mg/dL    Albumin 3.6 3.4 - 5.0 g/dL    Protein Total 7.5 6.8 - 8.8 g/dL    Alkaline Phosphatase 81 40 - 150 U/L    ALT 18 0 - 50 U/L    AST 12 0 - 45 U/L   INR   Result Value Ref [facility]    INR 0.92 0.86 - 1.14   *UA reflex to Microscopic and Culture ([facility] (except [place] and [place])   Result Value Ref [facility]    Color Urine Yellow     Appearance Urine Clear     Glucose Urine Negative NEG mg/dL    Bilirubin Urine Negative NEG    Ketones Urine Negative NEG mg/dL    Specific Gravity Urine 1.015 1.003 - 1.035    Blood Urine Negative NEG    pH Urine 7.0 5.0 - 7.0 pH    Protein Albumin Urine Negative  NEG mg/dL    Urobilinogen Urine 0.2 0.2 - 1.0 EU/dL    Nitrite Urine Negative NEG    Leukocyte Esterase Urine Small (A) NEG    Source Midstream Urine    TSH with free T4 reflex   Result Value Ref [facility]    TSH 0.85 0.40 - 4.00 mU/L   Urine Microscopic   Result Value Ref [facility]    WBC Urine 5-10 (A) 0 - 2 /HPF    RBC Urine O - 2 0 - 2 /HPF    Squamous Epithelial /LPF Urine Few FEW /LPF    Bacteria Urine Few (A) NEG /HPF      CHEST TWO VIEWS 5/8/2017 2:40 PM      HISTORY: Encounter for other preprocedural examination. Tobacco use.     COMPARISON: 2/18/2015.     FINDINGS: Old bilateral rib fractures. Heart and lungs negative.  Vertebroplasty cement in an upper lumbar vertebrae. Fusion hardware in  the lower cervical spine. No interval change.         IMPRESSION: Negative.     NEGRO HARRELL MD    EKG 5/8/2017:  Normal sinus rhythm    IMPRESSION:                                                    Reason for surgery/procedure: spinal fusions  Diagnosis/reason for consult: preop history and physical    The proposed surgical procedure is considered INTERMEDIATE risk.    REVISED CARDIAC RISK INDEX  The patient has the following serious cardiovascular risks for perioperative complications such as (MI, PE, VFib and 3  AV Block):  No serious cardiac risks  INTERPRETATION: 0 risks: Class I (very low risk - 0.4% complication rate)    The patient has the following additional risks for perioperative complications:  No identified additional risks  History of alcohol abuse, sober since 2014       ICD-10-CM    1. Tobacco user Z72.0    2. Graves disease E05.00        RECOMMENDATIONS:                                                      --Consult hospital rounder / IM to assist post-op medical management    --Patient is to take all scheduled medications on the day of surgery EXCEPT for modifications listed below.  none    APPROVAL GIVEN to proceed with proposed procedure, without further diagnostic evaluation       Signed  Electronically by: Tammy Tyler, NP, APRN CNP    Copy of this evaluation report is provided to requesting physician.    [place] Preop Guidelines

## 2017-05-08 NOTE — MR AVS SNAPSHOT
After Visit Summary   5/8/2017    Vesta Rodriguez    MRN: [id number]           Patient Information     Date Of Birth          1960        Visit Information        Provider Department      5/8/2017 2:00 PM Tammy Tyler APRN CNP [facility] [place]        Today's Diagnoses     Preop general physical exam    -  1    Tobacco user        Graves disease        Iron deficiency anemia, unspecified iron deficiency anemia type           Care Instructions      Before Your Surgery      Call your surgeon if there is any change in your health. This includes signs of a cold or flu (such as a sore throat, runny nose, cough, rash or fever).    Do not smoke, drink alcohol or take over the counter medicine (unless your surgeon or primary care doctor tells you to) for the 24 hours before and after surgery.    If you take prescribed drugs: Follow your doctor s orders about which medicines to take and which to stop until after surgery.    Eating and drinking prior to surgery: follow the instructions from your surgeon    Take a shower or bath the night before surgery. Use the soap your surgeon gave you to gently clean your skin. If you do not have soap from your surgeon, use your regular soap. Do not shave or scrub the surgery site.  Wear clean pajamas and have clean sheets on your bed.         Follow-ups after your visit        Who to contact     If you have questions or need follow up information about today's clinic visit or your schedule please contact [facility] directly at [phone].  Normal or non-critical lab and imaging results will be communicated to you by MyChart, letter or phone within 4 business days after the clinic has received the results. If you do not hear from us within 7 days, please contact the clinic through [employer] or phone. If you have a critical or abnormal lab result, we will notify you by phone as soon as possible.  Submit refill requests through [employer] or  call your pharmacy and they will forward the refill request to us. Please allow 3 business days for your refill to be completed.          Additional Information About Your Visit        TheTakeshart Information     Basha gives you secure access to your electronic health record. If you see a primary care provider, you can also send messages to your care team and make appointments. If you have questions, please call your primary care clinic.  If you do not have a primary care provider, please call [phone] and they will assist you.        Care EveryWhere ID     This is your Care EveryWhere ID. This could be used by other organizations to access your [place] medical records  [id number]        Your Vitals Were     Pulse Temperature Respirations Pulse Oximetry Breastfeeding? BMI (Body Mass Index)    86 97.7  F (36.5  C) (Tympanic) 18 98% No 31.86 kg/m2       Blood Pressure from Last 3 Encounters:   05/08/17 114/60   03/09/17 113/75   03/08/17 118/60    Weight from Last 3 Encounters:   05/08/17 168 lb 9.6 oz (76.5 kg)   03/09/17 170 lb (77.1 kg)   03/08/17 170 lb (77.1 kg)              We Performed the Following     *UA reflex to Microscopic and Culture ([place] and [facility] [facility] (except [place] and [place])     CBC with platelets differential     Comprehensive metabolic panel (BMP + Alb, Alk Phos, ALT, AST, Total. Bili, TP)     EKG 12-lead complete w/read - Clinics     EKG 12-lead complete w/read - Clinics     INR     TSH with free T4 reflex          Today's Medication Changes          These changes are accurate as of: 5/8/17  3:08 PM.  If you have any questions, ask your nurse or doctor.               Stop taking these medicines if you haven't already. Please contact your care team if you have questions.     AMITRIPTYLINE HCL PO   Stopped by:  Tammy Tyler APRN CNP           HYDROcodone-acetaminophen 5-325 MG per tablet   Commonly known as:  NORCO   Stopped by:  Tammy Tyler APRN CNP                     Primary Care Provider Office Phone # Fax #    JAVIER Child [phone]       [facility] [address] [address]        Thank you!     Thank you for choosing [facility]  for your care. Our goal is always to provide you with excellent care. Hearing back from our patients is one way we can continue to improve our services. Please take a few minutes to complete the written survey that you may receive in the mail after your visit with us. Thank you!             Your Updated Medication List - Protect others around you: Learn how to safely use, store and throw away your medicines at www.disposemymeds.org.          This list is accurate as of: 5/8/17  3:08 PM.  Always use your most recent med list.                   Brand Name Dispense Instructions for use    cyclobenzaprine 10 MG tablet    FLEXERIL    30 tablet    Take 0.5-1 tablets (5-10 mg) by mouth 3 times daily as needed for muscle spasms       fluticasone-salmeterol 100-50 MCG/DOSE diskus inhaler    ADVAIR    2 Inhaler    Inhale 1 puff into the lungs 2 times daily       insulin pen needle 31G X 5 MM    B-D U/F    100 each    Use 1 daily or as directed.       methimazole 5 MG tablet    TAPAZOLE    90 tablet    Take 1 tablet (5 mg) by mouth daily       order for DME      Equipment being ordered: CPAP       order for DME     1 Device    Equipment being ordered: Aqua K pad and pump. Diagnosis of progressive back pain with history of L3- sacral fusion, disc extrusion with impingement at L2-3 and L1-2.       pramipexole 1.5 MG tablet    MIRAPEX    180 tablet    Take 1 tablet (1.5 mg) by mouth 2 times daily       teriparatide (recombinant) 600 MCG/2.4ML Soln injection    FORTEO    1 mL    Inject 0.08 mLs (20 mcg) Subcutaneous daily       VISTARIL 25 MG capsule   Generic drug:  hydrOXYzine      Take 25 mg by mouth 3 times daily as needed for itching

## 2017-05-26 ENCOUNTER — TELEPHONE (OUTPATIENT)
Dept: FAMILY MEDICINE | Facility: CLINIC | Age: 57
End: 2017-05-26

## 2017-05-26 ASSESSMENT — MIFFLIN-ST. JEOR
SCORE: 1274.42
SCORE: 1274.42

## 2017-05-26 NOTE — TELEPHONE ENCOUNTER
Pt is having Surgery Tues 5/30/17 & 5/31/17.  They are questioning her UA 5/8/17 results. Ok to have surgery does she need a Antibiotic.   Loretta [place] Sec

## 2017-05-26 NOTE — TELEPHONE ENCOUNTER
See note below, UA is from 5-8-17.  Cadence Valdes RN    Read by Vesta Rodriguez at 5/9/2017  3:27 PM   Niki Lemons. The blood counts all looked good. The urine specimen showed  A few bacteria but doesn't seem to be a bladder infection. We don't have results on the other labs yet and will let ou know when we do. Tammy Tyler RNC, NP

## 2017-05-29 ENCOUNTER — ANESTHESIA - HEALTHEAST (OUTPATIENT)
Dept: SURGERY | Facility: HOSPITAL | Age: 57
End: 2017-05-29

## 2017-05-29 ENCOUNTER — SURGERY - HEALTHEAST (OUTPATIENT)
Dept: SURGERY | Facility: CLINIC | Age: 57
End: 2017-05-29

## 2017-05-30 ENCOUNTER — SURGERY - HEALTHEAST (OUTPATIENT)
Dept: SURGERY | Facility: HOSPITAL | Age: 57
End: 2017-05-30

## 2017-05-30 ENCOUNTER — TRANSFERRED RECORDS (OUTPATIENT)
Dept: HEALTH INFORMATION MANAGEMENT | Facility: CLINIC | Age: 57
End: 2017-05-30

## 2017-05-30 ASSESSMENT — MIFFLIN-ST. JEOR
SCORE: 1297.1
SCORE: 1297.1

## 2017-05-31 ENCOUNTER — ANESTHESIA - HEALTHEAST (OUTPATIENT)
Dept: SURGERY | Facility: HOSPITAL | Age: 57
End: 2017-05-31

## 2017-05-31 ENCOUNTER — SURGERY - HEALTHEAST (OUTPATIENT)
Dept: SURGERY | Facility: HOSPITAL | Age: 57
End: 2017-05-31

## 2017-06-02 ASSESSMENT — MIFFLIN-ST. JEOR
SCORE: 1370.58
SCORE: 1370.58

## 2017-06-08 ENCOUNTER — TELEPHONE (OUTPATIENT)
Dept: FAMILY MEDICINE | Facility: CLINIC | Age: 57
End: 2017-06-08

## 2017-06-08 DIAGNOSIS — M51.26 DISPLACEMENT OF LUMBAR INTERVERTEBRAL DISC WITHOUT MYELOPATHY: Primary | ICD-10-CM

## 2017-06-09 NOTE — TELEPHONE ENCOUNTER
Patient was notified the DME order is ready.  Patient will call back to let us know if she wants it faxed or .  Jacque JULIEN RN

## 2017-06-12 ENCOUNTER — TRANSFERRED RECORDS (OUTPATIENT)
Dept: HEALTH INFORMATION MANAGEMENT | Facility: CLINIC | Age: 57
End: 2017-06-12

## 2017-06-15 ENCOUNTER — TELEPHONE (OUTPATIENT)
Dept: FAMILY MEDICINE | Facility: CLINIC | Age: 57
End: 2017-06-15

## 2017-06-15 DIAGNOSIS — Z98.890 HISTORY OF BACK SURGERY: ICD-10-CM

## 2017-06-15 DIAGNOSIS — M54.12 BRACHIAL NEURITIS OR RADICULITIS: ICD-10-CM

## 2017-06-15 DIAGNOSIS — M50.20 DISPLACEMENT OF CERVICAL INTERVERTEBRAL DISC WITHOUT MYELOPATHY: Primary | ICD-10-CM

## 2017-06-15 NOTE — TELEPHONE ENCOUNTER
Jesus Alberto is calling from  [facility] [phone] for Vesta to get an order for grab bar and shower chair do to back surgery.  Nancy akhtar fax order to [facility] [phone].  D[facility]

## 2017-06-26 ENCOUNTER — OFFICE VISIT (OUTPATIENT)
Dept: FAMILY MEDICINE | Facility: CLINIC | Age: 57
End: 2017-06-26
Payer: MEDICARE

## 2017-06-26 VITALS
WEIGHT: 163 LBS | BODY MASS INDEX: 30.8 KG/M2 | DIASTOLIC BLOOD PRESSURE: 64 MMHG | TEMPERATURE: 98.9 F | SYSTOLIC BLOOD PRESSURE: 90 MMHG | HEART RATE: 80 BPM

## 2017-06-26 DIAGNOSIS — F17.200 TOBACCO USE DISORDER: ICD-10-CM

## 2017-06-26 DIAGNOSIS — G47.33 OBSTRUCTIVE SLEEP APNEA SYNDROME: ICD-10-CM

## 2017-06-26 DIAGNOSIS — I95.0 IDIOPATHIC HYPOTENSION: ICD-10-CM

## 2017-06-26 DIAGNOSIS — M54.42 ACUTE MIDLINE LOW BACK PAIN WITH LEFT-SIDED SCIATICA: Primary | ICD-10-CM

## 2017-06-26 PROCEDURE — 99214 OFFICE O/P EST MOD 30 MIN: CPT | Performed by: NURSE PRACTITIONER

## 2017-06-26 RX ORDER — NICOTINE 21 MG/24HR
1 PATCH, TRANSDERMAL 24 HOURS TRANSDERMAL
COMMUNITY
End: 2017-06-26

## 2017-06-26 RX ORDER — OXYCODONE HYDROCHLORIDE 5 MG/1
5-10 TABLET ORAL
COMMUNITY
Start: 2017-06-11 | End: 2017-06-26 | Stop reason: DRUGHIGH

## 2017-06-26 RX ORDER — NICOTINE 21 MG/24HR
1 PATCH, TRANSDERMAL 24 HOURS TRANSDERMAL EVERY 24 HOURS
Qty: 30 PATCH | Refills: 0 | Status: SHIPPED | OUTPATIENT
Start: 2017-06-26 | End: 2017-07-25

## 2017-06-26 RX ORDER — OXYCODONE HYDROCHLORIDE 10 MG/1
10 TABLET ORAL EVERY 4 HOURS PRN
Qty: 180 TABLET | Refills: 0 | Status: SHIPPED | OUTPATIENT
Start: 2017-06-26 | End: 2017-09-19

## 2017-06-26 RX ORDER — OXYCODONE HYDROCHLORIDE 5 MG/1
5-10 TABLET ORAL
Status: CANCELLED | OUTPATIENT
Start: 2017-06-26

## 2017-06-26 NOTE — PATIENT INSTRUCTIONS
Increase your fluids to eight 8 ounce glasses daily.    Call if you begin to have any issues with dizziness or lightheadedness.    Check BP weekly for the next 3 weeks. Bring in your readings at your next visit.    Apply the Nicoderm  Patch daily. Use the 14 mg patch daily for a month, then the 7 mg patch for a month then quit. You should rotate the site so skin doesn't become irritated.    Return to see me in 1 month

## 2017-06-26 NOTE — MR AVS SNAPSHOT
After Visit Summary   6/26/2017    Vesta Rodriguez    MRN: [id number]           Patient Information     Date Of Birth          1960        Visit Information        Provider Department      6/26/2017 8:00 AM Tammy Tyler APRN CNP [facility]        Today's Diagnoses     Acute midline low back pain with left-sided sciatica    -  1    Obstructive sleep apnea syndrome        Tobacco use disorder          Care Instructions    Increase your fluids to eight 8 ounce glasses daily.    Call if you begin to have any issues with dizziness or lightheadedness.    Check BP weekly for the next 3 weeks. Bring in your readings at your next visit.    Apply the Nicoderm  Patch daily. Use the 14 mg patch daily for a month, then the 7 mg patch for a month then quit. You should rotate the site so skin doesn't become irritated.    Return to see me in 1 month          Follow-ups after your visit        Who to contact     If you have questions or need follow up information about today's clinic visit or your schedule please contact [facility] directly at [phone].  Normal or non-critical lab and imaging results will be communicated to you by Indotradinghart, letter or phone within 4 business days after the clinic has received the results. If you do not hear from us within 7 days, please contact the clinic through Indot[employer]inghart or phone. If you have a critical or abnormal lab result, we will notify you by phone as soon as possible.  Submit refill requests through [employer] or call your pharmacy and they will forward the refill request to us. Please allow 3 business days for your refill to be completed.          Additional Information About Your Visit        MyChart Information     [employer] gives you secure access to your electronic health record. If you see a primary care provider, you can also send messages to your care team and make appointments. If you have questions, please call your primary  care clinic.  If you do not have a primary care provider, please call [phone] and they will assist you.        Care EveryWhere ID     This is your Care EveryWhere ID. This could be used by other organizations to access your [place] medical records  [id number]        Your Vitals Were     Pulse Temperature BMI (Body Mass Index)             80 98.9  F (37.2  C) (Tympanic) 30.8 kg/m2          Blood Pressure from Last 3 Encounters:   06/26/17 90/64   05/08/17 114/60   03/09/17 113/75    Weight from Last 3 Encounters:   06/26/17 163 lb (73.9 kg)   05/08/17 168 lb 9.6 oz (76.5 kg)   03/09/17 170 lb (77.1 kg)              We Performed the Following     Sleep DME          Today's Medication Changes          These changes are accurate as of: 6/26/17  9:20 AM.  If you have any questions, ask your nurse or doctor.               These medicines have changed or have updated prescriptions.        Dose/Directions    * nicotine 14 MG/24HR 24 hr patch   Commonly known as:  NICODERM CQ   This may have changed:    - how to take this  - when to take this   Used for:  Tobacco use disorder   Changed by:  Tammy Tyler APRN CNP        Dose:  1 patch   Place 1 patch onto the skin every 24 hours   Quantity:  30 patch   Refills:  0       * nicotine 7 MG/24HR 24 hr patch   Commonly known as:  NICODERM CQ   This may have changed:  You were already taking a medication with the same name, and this prescription was added. Make sure you understand how and when to take each.   Used for:  Tobacco use disorder   Changed by:  Tammy Tyler APRN CNP        Dose:  1 patch   Start taking on:  7/24/2017   Place 1 patch onto the skin every 24 hours for 28 days   Quantity:  28 patch   Refills:  0       oxyCODONE 10 MG IR tablet   Commonly known as:  ROXICODONE   This may have changed:    - medication strength  - how much to take  - when to take this  - reasons to take this  - additional instructions   Used for:  Acute midline low back  pain with left-sided sciatica   Changed by:  Tammy Tyler APRN CNP        Dose:  10 mg   Take 1 tablet (10 mg) by mouth every 4 hours as needed for moderate to severe pain or pain maximum 6 tablet(s) per day   Quantity:  180 tablet   Refills:  0       * Notice:  This list has 2 medication(s) that are the same as other medications prescribed for you. Read the directions carefully, and ask your doctor or other care provider to review them with you.         Where to get your medicines      These medications were sent to [facility] #[id number] - [place], [place] - [address]  [address] [place] [address]     Phone:  [phone]     nicotine 14 MG/24HR 24 hr patch    nicotine 7 MG/24HR 24 hr patch         Some of these will need a paper prescription and others can be bought over the counter.  Ask your nurse if you have questions.     Bring a paper prescription for each of these medications     oxyCODONE 10 MG IR tablet                Primary Care Provider Office Phone # Fax #    JAVIER Montilla [phone]       [facility] [address] [facility] [address]        Equal Access to Services     HUBERT ZAVALA AH: Hadii aad ku hadasho Soomaali, waaxda luqadaha, qaybta kaalmada adeegyada, joshua owusu hayrajiv cox . So [facility] [phone].    ATENCIÓN: Si habla español, tiene a moses disposición servicios gratuitos de asistencia lingüística. Llame al [phone].    We comply with applicable federal civil rights laws and [place] laws. We do not discriminate on the basis of race, color, national origin, age, disability sex, sexual orientation or gender identity.            Thank you!     Thank you for choosing [facility]  for your care. Our goal is always to provide you with excellent care. Hearing back from our patients is one way we can continue to improve our services. Please take a few minutes to complete the written survey that you may receive in the  mail after your visit with us. Thank you!             Your Updated Medication List - Protect others around you: Learn how to safely use, store and throw away your medicines at www.disposemymeds.org.          This list is accurate as of: 6/26/17  9:20 AM.  Always use your most recent med list.                   Brand Name Dispense Instructions for use Diagnosis    cyclobenzaprine 10 MG tablet    FLEXERIL    30 tablet    Take 0.5-1 tablets (5-10 mg) by mouth 3 times daily as needed for muscle spasms    Low back pain       fluticasone-salmeterol 100-50 MCG/DOSE diskus inhaler    ADVAIR    2 Inhaler    Inhale 1 puff into the lungs 2 times daily    Chronic obstructive pulmonary disease, unspecified COPD type (H)       insulin pen needle 31G X 5 MM    B-D U/F    100 each    Use 1 daily or as directed.    Osteoporosis       methimazole 5 MG tablet    TAPAZOLE    90 tablet    Take 1 tablet (5 mg) by mouth daily    Graves' disease       * nicotine 14 MG/24HR 24 hr patch    NICODERM CQ    30 patch    Place 1 patch onto the skin every 24 hours    Tobacco use disorder       * nicotine 7 MG/24HR 24 hr patch   Start taking on:  7/24/2017    NICODERM CQ    28 patch    Place 1 patch onto the skin every 24 hours for 28 days    Tobacco use disorder       order for DME      Equipment being ordered: CPAP        * order for DME     1 Device    Equipment being ordered: Aqua K pad and pump. Diagnosis of progressive back pain with history of L3- sacral fusion, disc extrusion with impingement at L2-3 and L1-2.    Intervertebral disc prolapse with impingement       * order for DME     1 Device    Shower Chair-patient weight 170#    Displacement of lumbar intervertebral disc without myelopathy       * order for DME     1 Device    GRAB BAR    Displacement of cervical intervertebral disc without myelopathy, Brachial neuritis or radiculitis, History of back surgery       oxyCODONE 10 MG IR tablet    ROXICODONE    180 tablet    Take 1 tablet (10  mg) by mouth every 4 hours as needed for moderate to severe pain or pain maximum 6 tablet(s) per day    Acute midline low back pain with left-sided sciatica       pramipexole 1.5 MG tablet    MIRAPEX    180 tablet    Take 1 tablet (1.5 mg) by mouth 2 times daily    Restless leg syndrome       teriparatide (recombinant) 600 MCG/2.4ML Soln injection    FORTEO    1 mL    Inject 0.08 mLs (20 mcg) Subcutaneous daily    Osteoporosis       VISTARIL 25 MG capsule   Generic drug:  hydrOXYzine      Take 25 mg by mouth 3 times daily as needed for itching    Graves disease       * Notice:  This list has 5 medication(s) that are the same as other medications prescribed for you. Read the directions carefully, and ask your doctor or other care provider to review them with you.

## 2017-06-26 NOTE — PROGRESS NOTES
SUBJECTIVE:                                                    Vesta Rodriguez is a 56 year old female who presents to clinic today for the following health issues:      Sleep Apnea  Current C-pap mask is leaking. Tried using when in the hospital. Needs face to face visit to have C-pap Rx refilled for new tubing/face mask. Patient is using and benefiting from the C-pap and will continue to use the C-pap    Medication Followup of Oxycodone    Taking Medication as prescribed: yes-prescribed after surgery    Side Effects:  None    Medication Helping Symptoms:  yes   Surgery on 5/30, 5/31by Dr. Alonso. Had both anterior and posterior approaches done. Blood pressure dropped in hopsital, was in ICU overnight. BP responded to fluids. Instructed to push fluids at discharge.    Per [place]'s discharge summary:  PROCEDURES PERFORMED DURING HOSPITALIZATION: Procedure(s):  STAGE 2 - POSTERIOR FUSION L2-3 BILATERAL, DECOMPRESSION L1-3 BILATERAL, HARDWARE REMOVAL L3-4 BILATERAL on 5/31/2017    BRIEF HOSPITAL COURSE: This 56 y.o. female was [facility] Procedure(s):  STAGE 2 - POSTERIOR FUSION L2-3 BILATERAL, DECOMPRESSION L1-3 BILATERAL, HARDWARE REMOVAL L3-4 BILATERAL. The patient had a stable post operative course. Standard prophylactic antibiotics were administered for 24 hours post surgery and the patient received DVT prophylaxis per service protocol. The patient's pain was initially controlled on intravenous pain medications and then weaned to oral medications prior to discharge. The patients pain was well controlled and the patient had met all physical therapy goals prior to discharge.     Patient then went to rehab facility. Has had one follow up visit with surgeon, all looked good.  Due to see Dr. Alonso in August .    Pain   Degree of pain varies. Pain in back present as well as left side sciatica with numbness on thigh groin to left hip  Pain at 6 most of time. Flares up to 10 at times, when gets  up in the morning.    Has been taking Oxycodone- 10 mg q4hr. - 8 tabs daily. This was prescribed at discharge.  Has had insurance issues- covering Oxy.  Needs refill.    Hypotension  BP Readings from Last 6 Encounters:   06/26/17 90/64   05/08/17 114/60   03/09/17 113/75   03/08/17 118/60   02/06/17 122/72   01/31/17 112/68   hypotensive in hospital felt due to dehydration. BP low her today. Patient not drinking a lot per her report  Is not on antihypertensives      Would like handicapped sticker form    Problem list and histories reviewed & adjusted, as indicated.  Additional history: as documented    Patient Active Problem List   Diagnosis     Brachial neuritis or radiculitis     Osteoporosis     Displacement of lumbar intervertebral disc without myelopathy     Restless legs syndrome (RLS)     Disturbance in sleep behavior     Displacement of cervical intervertebral disc without myelopathy     Anxiety     CARDIOVASCULAR SCREENING; LDL GOAL LESS THAN 130     Tobacco user     Family history of breast cancer in mother     Vitamin D deficiency     Chronic cholecystitis     Right ureteral stone     Thyroid nodule; on outsie dc,due for f/u 5/13     H/O vertebroplasty     COPD (chronic obstructive pulmonary disease); spirometry 3/10 ; rare use of mdi     Alcoholism (H)     Graves disease     S/P cholecystectomy     Major depressive disorder, recurrent episode, moderate (H)     Advance Care Planning     Past Surgical History:   Procedure Laterality Date     FUSION CERVICAL ANTERIOR TWO LEVELS      C 5-7     FUSION LUMBAR ANTERIOR THREE+ LEVELS      L3- SI     HYSTERECTOMY, DEYSI  1993    no oophorectomy     INJECT EPIDURAL TRANSFORAMINAL Bilateral 3/9/2017    Procedure: INJECT EPIDURAL TRANSFORAMINAL;  Surgeon: Pérez Valle MD;  Location:  OR     LAPAROSCOPIC CHOLECYSTECTOMY  9/12/2012    Procedure: LAPAROSCOPIC CHOLECYSTECTOMY;  Laparoscopic vs Open Cholecystectomy;  Surgeon: Esperanza Quinn MD;  Location:  [facility]     LASER HOLMIUM LITHOTRIPSY URETER(S), INSERT STENT, COMBINED  2012    Procedure: COMBINED CYSTOSCOPY, URETEROSCOPY, LASER HOLMIUM LITHOTRIPSY URETER(S), INSERT STENT;  Cystoscopy, Right Ureteroscopy Holmium Laser Lithotripsy , right ureteral stent placement *Latex Safe*;  Surgeon: Joshua Rm MD;  Location: [facility] OR     SURGICAL HISTORY OF -        section     SURGICAL HISTORY OF -       Tubal ligation     SURGICAL HISTORY OF -       Post hip fracture     SURGICAL HISTORY OF -       hardware removal, right iliac crest      VERTEBROPLASTY         Social History   Substance Use Topics     Smoking status: Former Smoker     Packs/day: 5.00     Types: Cigarettes     Quit date: 2017     Smokeless tobacco: Never Used     Alcohol use No     Family History   Problem Relation Age of Onset     HEART DISEASE Brother      Breast Cancer Mother       age 31     Alcohol/Drug Other            Reviewed and updated as needed this visit by clinical staff  Tobacco  Allergies  Med Hx  Surg Hx  Fam Hx  Soc Hx      Reviewed and updated as needed this visit by Provider         ROS:  Constitutional, HEENT, cardiovascular, pulmonary, gi and gu systems are negative, except as otherwise noted.    OBJECTIVE:     BP 90/64  Pulse 80  Temp 98.9  F (37.2  C) (Tympanic)  Wt 163 lb (73.9 kg)  BMI 30.8 kg/m2  Body mass index is 30.8 kg/(m^2).  GENERAL: healthy, alert and no distress  NECK: no adenopathy, no asymmetry, masses, or scars and thyroid normal to palpation  RESP: lungs clear to auscultation - no rales, rhonchi or wheezes  CV: regular rate and rhythm, normal S1 S2, no S3 or S4, no murmur, click or rub, no peripheral edema and peripheral pulses strong  MS: no gross musculoskeletal defects noted, no edema  Comprehensive back pain exam:  Tenderness of surgical site   SKIN:Lumbar midline incision- small scab at medial end with slight swelling. No erythema, no drainage  PSYCH: mentation  appears normal, affect normal/bright    Diagnostic Test Results:  none     ASSESSMENT/PLAN:       1. Acute midline low back pain with left-sided sciatica  Patient is taking 8 tablets a day currently. Discussed tapering down as she gets further out from surgery. No further refills. History of substance abuse. Discussed with patient and she is in agreement.  - oxyCODONE (ROXICODONE) 10 MG IR tablet; Take 1 tablet (10 mg) by mouth every 4 hours as needed for moderate to severe pain or pain maximum 6 tablet(s) per day  Dispense: 180 tablet; Refill: 0    2. Obstructive sleep apnea syndrome  Needs new equipment. This is appropriate, current mas did not work well in hospitl- leaking  - Sleep DME    3. Tobacco use disorder  Has not smoked since prior to surgery. Has been using Nicorette gum but has a difficult time tolerating after effects. Requesting patch  - nicotine (NICODERM CQ) 14 MG/24HR 24 hr patch; Place 1 patch onto the skin every 24 hours  Dispense: 30 patch; Refill: 0  - nicotine (NICODERM CQ) 7 MG/24HR 24 hr patch; Place 1 patch onto the skin every 24 hours for 28 days  Dispense: 28 patch; Refill: 0    4. Hypotension  Likely due to limited fluid intake. Discussed need to drink more which she agrees to do. Is to check BP weekly and bring readings to next appointment.    Patient Instructions   Increase your fluids to eight 8 ounce glasses daily.    Call if you begin to have any issues with dizziness or lightheadedness.    Check BP weekly for the next 3 weeks. Bring in your readings at your next visit.    Apply the Nicoderm  Patch daily. Use the 14 mg patch daily for a month, then the 7 mg patch for a month then quit. You should rotate the site so skin doesn't become irritated.    Return to see me in 1 month      Tammy Tyler, FLAVIA, APRN CNP  [facility]

## 2017-06-26 NOTE — Clinical Note
pls contact Dr. Alonso's office and request discharge summary from [facility] stay- surgery 5/30

## 2017-06-26 NOTE — NURSING NOTE
"Chief Complaint   Patient presents with     Orders     needs new order for cpap machine     Pain     medication refill     Forms     handicap form       Initial BP 90/64  Pulse 80  Temp 98.9  F (37.2  C) (Tympanic)  Wt 163 lb (73.9 kg)  BMI 30.8 kg/m2 Estimated body mass index is 30.8 kg/(m^2) as calculated from the following:    Height as of 3/9/17: 5' 1\" (1.549 m).    Weight as of this encounter: 163 lb (73.9 kg).  Medication Reconciliation: complete    Health Maintenance that is potentially due pending provider review:  Colonoscopy/FIT    n/a      "

## 2017-06-28 ENCOUNTER — MYC MEDICAL ADVICE (OUTPATIENT)
Dept: FAMILY MEDICINE | Facility: CLINIC | Age: 57
End: 2017-06-28

## 2017-06-28 DIAGNOSIS — I10 BENIGN ESSENTIAL HYPERTENSION: Primary | ICD-10-CM

## 2017-07-05 ENCOUNTER — DOCUMENTATION ONLY (OUTPATIENT)
Dept: OTHER | Facility: CLINIC | Age: 57
End: 2017-07-05

## 2017-07-05 DIAGNOSIS — Z71.89 ADVANCED DIRECTIVES, COUNSELING/DISCUSSION: Chronic | ICD-10-CM

## 2017-07-11 ENCOUNTER — MEDICAL CORRESPONDENCE (OUTPATIENT)
Dept: HEALTH INFORMATION MANAGEMENT | Facility: CLINIC | Age: 57
End: 2017-07-11

## 2017-07-12 ENCOUNTER — TELEPHONE (OUTPATIENT)
Dept: FAMILY MEDICINE | Facility: CLINIC | Age: 57
End: 2017-07-12

## 2017-07-12 NOTE — TELEPHONE ENCOUNTER
Addendum to office note 06.26.2017 needed under heading of Sleep Apnea needs to also state patient is using and benefits from C-PAP and will continue to use C-PAP machine. Also need to sign note. This request is from Loretta at the [employer] C-[facility]AP store fax # [phone] ph# [phone] this is all required due to Vesta having Medicare for insurance.    Sendy Ring CSS

## 2017-07-12 NOTE — TELEPHONE ENCOUNTER
Reason for Call:  Form, our goal is to have forms completed with 72 hours, however, some forms may require a visit or additional information.    Type of letter, form or note:  medical    Who is the form from?: [facility]    Where did the form come from: form was faxed in    What clinic location was the form placed at?: [place]    Where the form was placed: [facility]           Call taken on 7/12/2017 at 2:18 PM by Sendy Ring

## 2017-07-13 ENCOUNTER — TELEPHONE (OUTPATIENT)
Dept: FAMILY MEDICINE | Facility: CLINIC | Age: 57
End: 2017-07-13

## 2017-07-13 ENCOUNTER — MEDICAL CORRESPONDENCE (OUTPATIENT)
Dept: HEALTH INFORMATION MANAGEMENT | Facility: CLINIC | Age: 57
End: 2017-07-13

## 2017-07-13 NOTE — TELEPHONE ENCOUNTER
Reason for Call:  Form, our goal is to have forms completed with 72 hours, however, some forms may require a visit or additional information.    Type of letter, form or note:  [employer]- Blood pressure Monitor    Who is the form from?: [employer]- Blood pressure Monitor (if other please explain)    Where did the form come from: form was faxed in    What clinic location was the form placed at?: [place]    Where the form was placed: [facility]        Call taken on 7/13/2017 at 11:47 AM by Loretta Mccrary

## 2017-07-19 DIAGNOSIS — G25.81 RESTLESS LEG SYNDROME: ICD-10-CM

## 2017-07-19 RX ORDER — PRAMIPEXOLE DIHYDROCHLORIDE 1.5 MG/1
1.5 TABLET ORAL 2 TIMES DAILY
Qty: 180 TABLET | Refills: 0 | Status: SHIPPED | OUTPATIENT
Start: 2017-07-19 | End: 2017-10-09

## 2017-07-19 NOTE — TELEPHONE ENCOUNTER
pramipexole (MIRAPEX) 1.5 MG tablet     Last Written Prescription Date: 01/16/2017  Last Fill Quantity: 180 tablet, # refills: 1  Last Office Visit with [facility], [facility] or [facility] prescribing provider: 06/26/2017        BP Readings from Last 3 Encounters:   06/26/17 90/64   05/08/17 114/60   03/09/17 113/75

## 2017-07-25 ENCOUNTER — OFFICE VISIT (OUTPATIENT)
Dept: FAMILY MEDICINE | Facility: CLINIC | Age: 57
End: 2017-07-25
Payer: MEDICARE

## 2017-07-25 VITALS
RESPIRATION RATE: 18 BRPM | DIASTOLIC BLOOD PRESSURE: 60 MMHG | TEMPERATURE: 97.3 F | HEART RATE: 77 BPM | WEIGHT: 158.4 LBS | BODY MASS INDEX: 29.93 KG/M2 | SYSTOLIC BLOOD PRESSURE: 98 MMHG | OXYGEN SATURATION: 97 %

## 2017-07-25 DIAGNOSIS — Z98.1 S/P LUMBAR SPINAL FUSION: ICD-10-CM

## 2017-07-25 DIAGNOSIS — I95.0 IDIOPATHIC HYPOTENSION: Primary | ICD-10-CM

## 2017-07-25 PROCEDURE — 99214 OFFICE O/P EST MOD 30 MIN: CPT | Performed by: NURSE PRACTITIONER

## 2017-07-25 RX ORDER — OXYCODONE HYDROCHLORIDE 5 MG/1
TABLET ORAL
Qty: 70 TABLET | Refills: 0 | Status: SHIPPED | OUTPATIENT
Start: 2017-07-25 | End: 2017-09-19

## 2017-07-25 RX ORDER — FLUDROCORTISONE ACETATE 0.1 MG/1
0.1 TABLET ORAL DAILY
Qty: 30 TABLET | Refills: 0 | Status: SHIPPED | OUTPATIENT
Start: 2017-07-25 | End: 2017-09-19

## 2017-07-25 ASSESSMENT — ANXIETY QUESTIONNAIRES
3. WORRYING TOO MUCH ABOUT DIFFERENT THINGS: MORE THAN HALF THE DAYS
6. BECOMING EASILY ANNOYED OR IRRITABLE: MORE THAN HALF THE DAYS
7. FEELING AFRAID AS IF SOMETHING AWFUL MIGHT HAPPEN: NOT AT ALL
GAD7 TOTAL SCORE: 12
2. NOT BEING ABLE TO STOP OR CONTROL WORRYING: MORE THAN HALF THE DAYS
1. FEELING NERVOUS, ANXIOUS, OR ON EDGE: MORE THAN HALF THE DAYS
5. BEING SO RESTLESS THAT IT IS HARD TO SIT STILL: MORE THAN HALF THE DAYS

## 2017-07-25 ASSESSMENT — PATIENT HEALTH QUESTIONNAIRE - PHQ9: 5. POOR APPETITE OR OVEREATING: MORE THAN HALF THE DAYS

## 2017-07-25 ASSESSMENT — PAIN SCALES - GENERAL: PAINLEVEL: MILD PAIN (2)

## 2017-07-25 NOTE — NURSING NOTE
"Chief Complaint   Patient presents with     Hypertension       Initial Breastfeeding? No Estimated body mass index is 30.8 kg/(m^2) as calculated from the following:    Height as of 3/9/17: 5' 1\" (1.549 m).    Weight as of 6/26/17: 163 lb (73.9 kg).  Medication Reconciliation: complete      Health Maintenance that is potentially due pending provider review:  Colonoscopy/FIT    Gave pt phone number/pended order to schedule mammo and/or colonoscopy(or FIT)  "

## 2017-07-25 NOTE — PROGRESS NOTES
SUBJECTIVE:                                                    Vesta Rodriguez is a 56 year old female who presents to clinic today for the following health issues:      Hypotension       Outpatient blood pressures are being checked at home.  Results are /40-72  Pulse 62-72    Low Salt Diet: low salt. Does not like salt and doesn't want to increase her intake    Amount of exercise or physical activity: 6-7 days/week for an average of 30 minutes    Problems taking medications regularly: NA    Medication side effects: possibly medication caused,     Patient tried reducing Mirapex and still continued hypotension. Is not on antihypertensives  Diet: regular (no restrictions)    BP Readings from Last 6 Encounters:   07/25/17 98/60   06/26/17 90/64   05/08/17 114/60   03/09/17 113/75   03/08/17 118/60   02/06/17 122/72       Depression Followup    Status since last visit: same  PHQ-9 SCORE 6/30/2016 2/6/2017 7/25/2017   Total Score - - -   Total Score MyChart 7 (Mild depression) - -   Total Score - 9 10         See PHQ-9 for current symptoms.  Other associated symptoms: anxiety    Complicating factors:   Significant life event:  Yes-  surgery   Current substance abuse:  None  Anxiety or Panic symptoms:  Yes-      PHQ-9  English  PHQ-9   Any Language    Chronic pain, status post POSTERIOR FUSION L2-3 BILATERAL, DECOMPRESSION L1-3 BILATERAL, HARDWARE REMOVAL L3-4 BILATERAL on 5/31/2017  Pain is improved some but worsens with activity. Pain level at 5 most days.   Stir crazy without activity.she is used to being  Physically active and is disappointed that she has not been able to resume activities.   Would like to resume  biking and therapy.  Discussed weaning Oxycodone  [phone] Dr. Alonso-    Problem list and histories reviewed & adjusted, as indicated.  Additional history: as documented    Patient Active Problem List   Diagnosis     Brachial neuritis or radiculitis     Osteoporosis     Displacement of lumbar  intervertebral disc without myelopathy     Restless legs syndrome (RLS)     Disturbance in sleep behavior     Displacement of cervical intervertebral disc without myelopathy     Anxiety     CARDIOVASCULAR SCREENING; LDL GOAL LESS THAN 130     Tobacco user     Family history of breast cancer in mother     Vitamin D deficiency     Chronic cholecystitis     Right ureteral stone     Thyroid nodule; on outsie dc,due for f/u      H/O vertebroplasty     COPD (chronic obstructive pulmonary disease); spirometry 3/10 ; rare use of mdi     Alcoholism (H)     Graves disease     S/P cholecystectomy     Major depressive disorder, recurrent episode, moderate (H)     Advance Care Planning     Past Surgical History:   Procedure Laterality Date     FUSION CERVICAL ANTERIOR TWO LEVELS      C 5-7     FUSION LUMBAR ANTERIOR THREE+ LEVELS      L3- SI     HYSTERECTOMY, DEYSI      no oophorectomy     INJECT EPIDURAL TRANSFORAMINAL Bilateral 3/9/2017    Procedure: INJECT EPIDURAL TRANSFORAMINAL;  Surgeon: Pérez Valle MD;  Location: [facility]     LAPAROSCOPIC CHOLECYSTECTOMY  2012    Procedure: LAPAROSCOPIC CHOLECYSTECTOMY;  Laparoscopic vs Open Cholecystectomy;  Surgeon: Esperanza Quinn MD;  Location: [facility]     LASER HOLMIUM LITHOTRIPSY URETER(S), INSERT STENT, COMBINED  2012    Procedure: COMBINED CYSTOSCOPY, URETEROSCOPY, LASER HOLMIUM LITHOTRIPSY URETER(S), INSERT STENT;  Cystoscopy, Right Ureteroscopy Holmium Laser Lithotripsy , right ureteral stent placement *Latex Safe*;  Surgeon: Joshua Rm MD;  Location: [facility] OR     SURGICAL HISTORY OF -        section     SURGICAL HISTORY OF -       Tubal ligation     SURGICAL HISTORY OF -       Post hip fracture     SURGICAL HISTORY OF -       hardware removal, right iliac crest      VERTEBROPLASTY         Social History   Substance Use Topics     Smoking status: Former Smoker     Packs/day: 5.00     Types: Cigarettes     Quit date:  2017     Smokeless tobacco: Never Used     Alcohol use No     Family History   Problem Relation Age of Onset     HEART DISEASE Brother      Breast Cancer Mother       age 31     Alcohol/Drug Other              Reviewed and updated as needed this visit by clinical staffTobacco  Allergies       Reviewed and updated as needed this visit by Provider         ROS:  Constitutional, HEENT, cardiovascular, pulmonary, GI, , musculoskeletal, neuro, skin, endocrine and psych systems are negative, except as otherwise noted.      OBJECTIVE:   BP 98/60 (BP Location: Left arm, Patient Position: Chair, Cuff Size: Adult Regular)  Pulse 77  Temp 97.3  F (36.3  C) (Tympanic)  Resp 18  Wt 158 lb 6.4 oz (71.8 kg)  SpO2 97%  Breastfeeding? No  BMI 29.93 kg/m2  Body mass index is 29.93 kg/(m^2).  GENERAL: healthy, alert, no distress and wearing TLSO  EYES: Eyes grossly normal to inspection and conjunctivae and sclerae normal  HENT: ear canals and TM's normal, nose and mouth without ulcers or lesions  NECK: no adenopathy, no asymmetry, masses, or scars and thyroid normal to palpation  RESP: lungs clear to auscultation - no rales, rhonchi or wheezes  CV: regular rate and rhythm, normal S1 S2, no S3 or S4, no murmur, click or rub, no peripheral edema and peripheral pulses strong  ABDOMEN: soft, nontender, no hepatosplenomegaly, no masses and bowel sounds normal  MS: no gross musculoskeletal defects noted, no edema. TLSO in place. Posterior back incision is checked and without erythema, drainage or warmth. One very small closed area at base of incision with a small bump, noted  NEURO: Normal strength and tone, mentation intact and speech normal  PSYCH: mentation appears normal, affect normal/bright    Diagnostic Test Results:  none    ASSESSMENT/PLAN:         1. Idiopathic hypotension  Uncertain etiology. Will ask pharmacy to review medications. Does not want to increase salt/sodium intake. For now will start Florinef and  monitor  - fludrocortisone (FLORINEF) 0.1 MG tablet; Take 1 tablet (0.1 mg) by mouth daily  Dispense: 30 tablet; Refill: 0    2. S/P lumbar spinal fusion  Doing well post op. Anxious to begin activities. Agrees to wean Oxycodone. She plans to tlak with surgeon, Dr. Henson regarding activity level/exerise.  - oxyCODONE (ROXICODONE) 5 MG IR tablet; Take one tablet every 4 hours as needed for severe pain, maximum 6 tablet(s) per day. In a week, decrease to one tablet every 6 hours as needed, maximum 4 tablet(s) per day  Dispense: 70 tablet; Refill: 0    Patient Instructions   Start the Fludrocortisone daily.    Continue to drink a lot of fluids    Wear compression/SAVANNAH stockings while you're up.    Continue to log your blood pressures.    Begin to taper the Oxycodone- split your tablets in half so you will be taking 5 mg every 4 hours as needed for severe pain. After a week, then change to 5 mg every 6 hours alternating plain Tylenol between doses. We can talk about how your pain is when we have the phone visit.    Please schedule a telephone visit in 2 weeks.       Your clinic record indicates that you are due for:   Colonoscopy or yearly stool blood testing (FIT) and complete physical exam  Complete and mail FIT (stool) test for colon cancer screening          Tammy Tyler, FLAVIA, APRN [facility]

## 2017-07-25 NOTE — LETTER
My Depression Action Plan  Name: Vesta Rodriguez   Date of Birth 1960  Date: 7/25/2017    My doctor: Tammy Tyler   My clinic: [facility]  [address] [facility] [address]  [phone]          GREEN    ZONE   Good Control    What it looks like:     Things are going generally well. You have normal up s and down s. You may even feel depressed from time to time, but bad moods usually last less than a day.   What you need to do:  1. Continue to care for yourself (see self care plan)  2. Check your depression survival kit and update it as needed  3. Follow your physician s recommendations including any medication.  4. Do not stop taking medication unless you consult with your physician first.           YELLOW         ZONE Getting Worse    What it looks like:     Depression is starting to interfere with your life.     It may be hard to get out of bed; you may be starting to isolate yourself from others.    Symptoms of depression are starting to last most all day and this has happened for several days.     You may have suicidal thoughts but they are not constant.   What you need to do:     1. Call your care team, your response to treatment will improve if you keep your care team informed of your progress. Yellow periods are signs an adjustment may need to be made.     2. Continue your self-care, even if you have to fake it!    3. Talk to someone in your support network    4. Open up your depression survival kit           RED    ZONE Medical Alert - Get Help    What it looks like:     Depression is seriously interfering with your life.     You may experience these or other symptoms: You can t get out of bed most days, can t work or engage in other necessary activities, you have trouble taking care of basic hygiene, or basic responsibilities, thoughts of suicide or death that will not go away, self-injurious behavior.     What you need to do:  1. Call your care team and  request a same-day appointment. If they are not available (weekends or after hours) call your local crisis line, emergency room or 911.      Electronically signed by: Vesta Bradley, July 25, 2017    Depression Self Care Plan / Survival Kit    Self-Care for Depression  Here s the deal. Your body and mind are really not as separate as most people think.  What you do and think affects how you feel and how you feel influences what you do and think. This means if you do things that people who feel good do, it will help you feel better.  Sometimes this is all it takes.  There is also a place for medication and therapy depending on how severe your depression is, so be sure to consult with your medical provider and/ or Behavioral Health Consultant if your symptoms are worsening or not improving.     In order to better manage my stress, I will:    Exercise  Get some form of exercise, every day. This will help reduce pain and release endorphins, the  feel good  chemicals in your brain. This is almost as good as taking antidepressants!  This is not the same as joining a gym and then never going! (they count on that by the way ) It can be as simple as just going for a walk or doing some gardening, anything that will get you moving.      Hygiene   Maintain good hygiene (Get out of bed in the morning, Make your bed, Brush your teeth, Take a shower, and Get dressed like you were going to work, even if you are unemployed).  If your clothes don't fit try to get ones that do.    Diet  I will strive to eat foods that are good for me, drink plenty of water, and avoid excessive sugar, caffeine, alcohol, and other mood-altering substances.  Some foods that are helpful in depression are: complex carbohydrates, B vitamins, flaxseed, fish or fish oil, fresh fruits and vegetables.    Psychotherapy  I agree to participate in Individual Therapy (if recommended).    Medication  If prescribed medications, I agree to take them.  Missing doses  can result in serious side effects.  I understand that drinking alcohol, or other illicit drug use, may cause potential side effects.  I will not stop my medication abruptly without first discussing it with my provider.    Staying Connected With Others  I will stay in touch with my friends, family members, and my primary care provider/team.    Use your imagination  Be creative.  We all have a creative side; it doesn t matter if it s oil painting, sand castles, or mud pies! This will also kick up the endorphins.    Witness Beauty  (AKA stop and smell the roses) Take a look outside, even in mid-winter. Notice colors, textures. Watch the squirrels and birds.     Service to others  Be of service to others.  There is always someone else in need.  By helping others we can  get out of ourselves  and remember the really important things.  This also provides opportunities for practicing all the other parts of the program.    Humor  Laugh and be silly!  Adjust your TV habits for less news and crime-drama and more comedy.    Control your stress  Try breathing deep, massage therapy, biofeedback, and meditation. Find time to relax each day.     My support system    Clinic Contact:  Phone number:    Contact 1:  Phone number:    Contact 2:  Phone number:    Scientologist/:  Phone number:    Therapist:  Phone number:    Local crisis center:    Phone number:    Other community support:  Phone number:

## 2017-07-25 NOTE — MR AVS SNAPSHOT
After Visit Summary   7/25/2017    Vesta Rodriguez    MRN: [id number]           Patient Information     Date Of Birth          1960        Visit Information        Provider Department      7/25/2017 11:00 AM Tammy Tyler APRN CNP [facility]        Today's Diagnoses     Idiopathic hypotension    -  1      Care Instructions    Start the Fludrocortisone daily.    Continue to drink a lot of fluids    Wear compression/SAVANNAH stockings while you're up.    Continue to log your blood pressures.    Begin to taper the Oxycodone- split your tablets in half so you will be taking 5 mg every 4 hours as needed for severe pain. After a week, then change to 5 mg every 6 hours alternating plain Tylenol between doses. We can talk about how your pain is when we have the phone visit.    Please schedule a telephone visit in 2 weeks.       Your clinic record indicates that you are due for:   Colonoscopy or yearly stool blood testing (FIT) and complete physical exam  Complete and mail FIT (stool) test for colon cancer screening              Follow-ups after your visit        Who to contact     If you have questions or need follow up information about today's clinic visit or your schedule please contact [facility] directly at [phone].  Normal or non-critical lab and imaging results will be communicated to you by MyChart, letter or phone within 4 business days after the clinic has received the results. If you do not hear from us within 7 days, please contact the clinic through [employer] or phone. If you have a critical or abnormal lab result, we will notify you by phone as soon as possible.  Submit refill requests through [employer] or call your pharmacy and they will forward the refill request to us. Please allow 3 business days for your refill to be completed.          Additional Information About Your Visit        MyChart Information     [employer] gives you secure access to your  electronic health record. If you see a primary care provider, you can also send messages to your care team and make appointments. If you have questions, please call your primary care clinic.  If you do not have a primary care provider, please call [phone] and they will assist you.        Care EveryWhere ID     This is your Care EveryWhere ID. This could be used by other organizations to access your [facility] medical records  [id number]        Your Vitals Were     Pulse Temperature Respirations Pulse Oximetry Breastfeeding? BMI (Body Mass Index)    77 97.3  F (36.3  C) (Tympanic) 18 97% No 29.93 kg/m2       Blood Pressure from Last 3 Encounters:   07/25/17 98/60   06/26/17 90/64   05/08/17 114/60    Weight from Last 3 Encounters:   07/25/17 158 lb 6.4 oz (71.8 kg)   06/26/17 163 lb (73.9 kg)   05/08/17 168 lb 9.6 oz (76.5 kg)              We Performed the Following     DEPRESSION ACTION PLAN (DAP)          Today's Medication Changes          These changes are accurate as of: 7/25/17 12:38 PM.  If you have any questions, ask your nurse or doctor.               Start taking these medicines.        Dose/Directions    fludrocortisone 0.1 MG tablet   Commonly known as:  FLORINEF   Used for:  Idiopathic hypotension   Started by:  Tammy Tyler APRN CNP        Dose:  0.1 mg   Take 1 tablet (0.1 mg) by mouth daily   Quantity:  30 tablet   Refills:  0         Stop taking these medicines if you haven't already. Please contact your care team if you have questions.     nicotine 14 MG/24HR 24 hr patch   Commonly known as:  NICODERM CQ   Stopped by:  Tammy Tyler APRN CNP           nicotine 7 MG/24HR 24 hr patch   Commonly known as:  NICODERM CQ   Stopped by:  Tammy Tyler APRN CNP                Where to get your medicines      These medications were sent to [id number] - [place], [place] - [address]  [address] [place] [address] [address]     Phone:  [phone]     fludrocortisone 0.1 MG tablet                 Primary Care Provider Office Phone # Fax #    JAVIER Montilla [facility] [phone]       [facility] [address] [facility] [address]        Equal Access to Services     HUBERT ZAVALA : Hadii aad ku hadorlandoo Somanali, waaxda luqadaha, qaybta kaalmada adeegyada, waxdhruv howein andin jessyuli welshmagnolia pacheco. So [facility] [phone].    ATENCIÓN: Si habla español, tiene a moses disposición servicios gratuitos de asistencia lingüística. Llame al [phone].    We comply with applicable federal civil rights laws and [place] laws. We do not discriminate on the basis of race, color, national origin, age, disability sex, sexual orientation or gender identity.            Thank you!     Thank you for choosing [facility]  for your care. Our goal is always to provide you with excellent care. Hearing back from our patients is one way we can continue to improve our services. Please take a few minutes to complete the written survey that you may receive in the mail after your visit with us. Thank you!             Your Updated Medication List - Protect others around you: Learn how to safely use, store and throw away your medicines at www.disposemymeds.org.          This list is accurate as of: 7/25/17 12:38 PM.  Always use your most recent med list.                   Brand Name Dispense Instructions for use Diagnosis    fludrocortisone 0.1 MG tablet    FLORINEF    30 tablet    Take 1 tablet (0.1 mg) by mouth daily    Idiopathic hypotension       fluticasone-salmeterol 100-50 MCG/DOSE diskus inhaler    ADVAIR    2 Inhaler    Inhale 1 puff into the lungs 2 times daily    Chronic obstructive pulmonary disease, unspecified COPD type (H)       insulin pen needle 31G X 5 MM    B-D U/F    100 each    Use 1 daily or as directed.    Osteoporosis       methimazole 5 MG tablet    TAPAZOLE    90 tablet    Take 1 tablet (5 mg) by mouth daily    Graves' disease       order for DME      Equipment  being ordered: CPAP        * order for DME     1 Device    Equipment being ordered: Aqua K pad and pump. Diagnosis of progressive back pain with history of L3- sacral fusion, disc extrusion with impingement at L2-3 and L1-2.    Intervertebral disc prolapse with impingement       * order for DME     1 Device    Shower Chair-patient weight 170#    Displacement of lumbar intervertebral disc without myelopathy       * order for DME     1 Device    GRAB BAR    Displacement of cervical intervertebral disc without myelopathy, Brachial neuritis or radiculitis, History of back surgery       * order for DME     1 Units    Blood pressure monitor    Benign essential hypertension       oxyCODONE 10 MG IR tablet    ROXICODONE    180 tablet    Take 1 tablet (10 mg) by mouth every 4 hours as needed for moderate to severe pain or pain maximum 6 tablet(s) per day    Acute midline low back pain with left-sided sciatica       pramipexole 1.5 MG tablet    MIRAPEX    180 tablet    Take 1 tablet (1.5 mg) by mouth 2 times daily    Restless leg syndrome       teriparatide (recombinant) 600 MCG/2.4ML Soln injection    FORTEO    1 mL    Inject 0.08 mLs (20 mcg) Subcutaneous daily    Osteoporosis       VISTARIL 25 MG capsule   Generic drug:  hydrOXYzine      Take 25 mg by mouth 3 times daily as needed for itching    Graves disease       * Notice:  This list has 4 medication(s) that are the same as other medications prescribed for you. Read the directions carefully, and ask your doctor or other care provider to review them with you.

## 2017-07-25 NOTE — PATIENT INSTRUCTIONS
Start the Fludrocortisone daily.    Continue to drink a lot of fluids    Wear compression/SAVANNAH stockings while you're up.    Continue to log your blood pressures.    Begin to taper the Oxycodone- split your tablets in half so you will be taking 5 mg every 4 hours as needed for severe pain. After a week, then change to 5 mg every 6 hours alternating plain Tylenol between doses. We can talk about how your pain is when we have the phone visit.    Please schedule a telephone visit in 2 weeks.       Your clinic record indicates that you are due for:   Colonoscopy or yearly stool blood testing (FIT) and complete physical exam  Complete and mail FIT (stool) test for colon cancer screening

## 2017-07-26 ASSESSMENT — ANXIETY QUESTIONNAIRES: GAD7 TOTAL SCORE: 12

## 2017-07-26 ASSESSMENT — PATIENT HEALTH QUESTIONNAIRE - PHQ9: SUM OF ALL RESPONSES TO PHQ QUESTIONS 1-9: 10

## 2017-07-31 ENCOUNTER — MYC MEDICAL ADVICE (OUTPATIENT)
Dept: FAMILY MEDICINE | Facility: CLINIC | Age: 57
End: 2017-07-31

## 2017-08-10 ENCOUNTER — TRANSFERRED RECORDS (OUTPATIENT)
Dept: HEALTH INFORMATION MANAGEMENT | Facility: CLINIC | Age: 57
End: 2017-08-10

## 2017-08-21 ENCOUNTER — MYC MEDICAL ADVICE (OUTPATIENT)
Dept: FAMILY MEDICINE | Facility: CLINIC | Age: 57
End: 2017-08-21

## 2017-08-23 ENCOUNTER — TELEPHONE (OUTPATIENT)
Dept: FAMILY MEDICINE | Facility: CLINIC | Age: 57
End: 2017-08-23

## 2017-08-23 ENCOUNTER — MYC MEDICAL ADVICE (OUTPATIENT)
Dept: FAMILY MEDICINE | Facility: CLINIC | Age: 57
End: 2017-08-23

## 2017-08-23 DIAGNOSIS — M81.0 OSTEOPOROSIS WITHOUT CURRENT PATHOLOGICAL FRACTURE, UNSPECIFIED OSTEOPOROSIS TYPE: ICD-10-CM

## 2017-08-23 DIAGNOSIS — G25.81 RESTLESS LEG SYNDROME: Primary | ICD-10-CM

## 2017-08-23 RX ORDER — ROPINIROLE 0.25 MG/1
0.25-0.5 TABLET, FILM COATED ORAL AT BEDTIME
Qty: 60 TABLET | Refills: 1 | Status: SHIPPED | OUTPATIENT
Start: 2017-08-23 | End: 2017-10-18

## 2017-08-23 NOTE — TELEPHONE ENCOUNTER
Patient called and discussed worsening restless leg symptoms.  She is not getting relief from Mirapex.  Discussed trial of ropinirole or pregabalin.  Will start ropinirole 0.25 mg 1-3 hours before bedtime.  Patient can increase to 0.5 mg if needed.  She has had recent follow-up visit with Dr. Alonso, surgeon who performed the lumbar spinal fusion in the spring.  She needs to continue wearing her brace at all times and will follow up with the surgeon the end of September.  She was hopeful that she would be able to take the brace off at nighttime and is disappointed that she cannot.  Has a diagnosis of osteoporosis and is on Forteo.  We will repeat the DEXA scan. Tammy Tyler RNC, NP  [facility]

## 2017-08-23 NOTE — TELEPHONE ENCOUNTER
Restless legs      Duration: worse for 2 weeks    Description  Location: bilateral restless legs    Intensity:  severe    Accompanying signs and symptoms: restless legs all day long, unable to sleep, feels like something is crawling on lower legs    History  Previous similar problem: YES but has not had this bad.   Previous evaluation:    status post POSTERIOR FUSION L2-3 BILATERAL, DECOMPRESSION L1-3 BILATERAL, HARDWARE REMOVAL L3-4 BILATERAL on 5/31/2017  Continues to wear back brace.    Precipitating or alleviating factors:  Trauma or overuse: no   Aggravating factors include: none    Therapies tried and outcome: has taken up to 4 Mirapex and it has not helped    Patient is unable to take Gabapentin due to side effects.    Is there any other different medication?    Patient will check with her insurance about telephone visits, declined telephone visit due to insurance.

## 2017-08-30 ENCOUNTER — HOSPITAL ENCOUNTER (OUTPATIENT)
Dept: BONE DENSITY | Facility: CLINIC | Age: 57
Discharge: HOME | End: 2017-08-30
Attending: NURSE PRACTITIONER | Admitting: NURSE PRACTITIONER
Payer: MEDICARE

## 2017-08-30 DIAGNOSIS — M81.0 OSTEOPOROSIS WITHOUT CURRENT PATHOLOGICAL FRACTURE, UNSPECIFIED OSTEOPOROSIS TYPE: ICD-10-CM

## 2017-08-30 PROCEDURE — 77080 DXA BONE DENSITY AXIAL: CPT

## 2017-09-05 ENCOUNTER — MYC MEDICAL ADVICE (OUTPATIENT)
Dept: FAMILY MEDICINE | Facility: CLINIC | Age: 57
End: 2017-09-05

## 2017-09-06 ENCOUNTER — TELEPHONE (OUTPATIENT)
Dept: FAMILY MEDICINE | Facility: CLINIC | Age: 57
End: 2017-09-06

## 2017-09-06 ENCOUNTER — MYC MEDICAL ADVICE (OUTPATIENT)
Dept: FAMILY MEDICINE | Facility: CLINIC | Age: 57
End: 2017-09-06

## 2017-09-06 NOTE — TELEPHONE ENCOUNTER
Patient called wondering about her Dexa Scan results. MyChart was sent to provider and provider notified of results and recommendations.  Results were not released to patient though.  Patient called to get the numbers from the scan.  I notified patient of readings from Dexa Scan and patient asked for the results to be released to [facility].  Spoke to provider and Holly verbal[facility]. Dexa Scan was released to [employer].   Jack Vargas M.A.

## 2017-09-07 ENCOUNTER — MYC MEDICAL ADVICE (OUTPATIENT)
Dept: FAMILY MEDICINE | Facility: CLINIC | Age: 57
End: 2017-09-07

## 2017-09-19 ENCOUNTER — OFFICE VISIT (OUTPATIENT)
Dept: FAMILY MEDICINE | Facility: CLINIC | Age: 57
End: 2017-09-19
Payer: MEDICARE

## 2017-09-19 VITALS
RESPIRATION RATE: 18 BRPM | OXYGEN SATURATION: 98 % | WEIGHT: 166 LBS | SYSTOLIC BLOOD PRESSURE: 126 MMHG | BODY MASS INDEX: 31.37 KG/M2 | DIASTOLIC BLOOD PRESSURE: 74 MMHG | HEART RATE: 95 BPM | TEMPERATURE: 98.5 F

## 2017-09-19 DIAGNOSIS — Z71.6 ENCOUNTER FOR SMOKING CESSATION COUNSELING: ICD-10-CM

## 2017-09-19 DIAGNOSIS — F88 SENSORY INTEGRATION DYSFUNCTION: ICD-10-CM

## 2017-09-19 DIAGNOSIS — Z23 NEED FOR PROPHYLACTIC VACCINATION AND INOCULATION AGAINST INFLUENZA: ICD-10-CM

## 2017-09-19 DIAGNOSIS — Z72.0 TOBACCO ABUSE DISORDER: ICD-10-CM

## 2017-09-19 DIAGNOSIS — M51.26 DISPLACEMENT OF LUMBAR INTERVERTEBRAL DISC WITHOUT MYELOPATHY: Primary | ICD-10-CM

## 2017-09-19 PROCEDURE — 99214 OFFICE O/P EST MOD 30 MIN: CPT | Mod: 25 | Performed by: NURSE PRACTITIONER

## 2017-09-19 PROCEDURE — 90686 IIV4 VACC NO PRSV 0.5 ML IM: CPT | Performed by: NURSE PRACTITIONER

## 2017-09-19 PROCEDURE — G0008 ADMIN INFLUENZA VIRUS VAC: HCPCS | Performed by: NURSE PRACTITIONER

## 2017-09-19 ASSESSMENT — PAIN SCALES - GENERAL: PAINLEVEL: EXTREME PAIN (8)

## 2017-09-19 NOTE — NURSING NOTE
"Chief Complaint   Patient presents with     Musculoskeletal Problem     restless legs     Pain       Initial /74 (BP Location: Right arm, Patient Position: Chair, Cuff Size: Adult Regular)  Pulse 95  Temp 98.5  F (36.9  C) (Tympanic)  Resp 18  Wt 166 lb (75.3 kg)  SpO2 98%  Breastfeeding? No  BMI 31.37 kg/m2 Estimated body mass index is 31.37 kg/(m^2) as calculated from the following:    Height as of 3/9/17: 5' 1\" (1.549 m).    Weight as of this encounter: 166 lb (75.3 kg).  Medication Reconciliation: complete      Health Maintenance that is potentially due pending provider review:  Mammogram and Colonoscopy/FIT    Patient will mail in FIT.  "

## 2017-09-19 NOTE — PROGRESS NOTES
SUBJECTIVE:   Vesta Rodriguez is a 56 year old female who presents to clinic today for the following health issues:    Chronic Pain Follow-Up       Type / Location of Pain: low to mid back  Analgesia/pain control:       Recent changes:  Same, continued pain following surgery      Overall control: Inadequate pain control  Activity level/function:      Daily activities:  Able to do light housework, cooking. Continues to wear back brace which limits her activity    Work:  Unable to work  Adverse effects:  No  Adherance    Taking medication as directed?  NA  Tried aspirin and tylenol which were not helpful. REQUESTS a prescription for tylenol #3    Participating in other treatments: yes PT and psychologist   Risk Factors:    Sleep:  Poor    Mood/anxiety:  worsened    Recent family or social stressors:  none noted    Other aggravating factors: none  PHQ-9 SCORE 6/30/2016 2/6/2017 7/25/2017   Total Score - - -   Total Score MyChart 7 (Mild depression) - -   Total Score - 9 10     ANGELA-7 SCORE 1/26/2016 2/6/2017 7/25/2017   Total Score - - -   Total Score 16 20 12     Encounter-Level CSA:     There are no encounter-level csa.        Mid lumbar pain, feels like something is pinching. physical therapy  weekly for  core strengthening  Saw Dr. Alonso's PA on 8/10, needs to stay in brace at all times. Seeing Dr. Alonso's PA again at end of Sept.  She is hopeful that she may be able to change to smaller brace  No Oxycodone use since mid summer. Has history of addiction- EToH.    Activity- walking, only. Frustrated as used to be very active. Sees therapist, recommends sensory integrative therapy through occupational therapy. Would like to do therapy in [place] as close to home      Amount of exercise or physical activity: walks daily    Problems taking medications regularly: No    Medication side effects: none  Diet: regular (no restrictions)      Restless leg syndrome      Duration: chronic    Description  (location/character/radiation): leg spasms when sits to relax or sleep    Intensity:  severe    Accompanying signs and symptoms: recent back surgery    History (similar episodes/previous evaluation): chronic problem    Precipitating or alleviating factors: None    Therapies tried and outcome: Requip did not work. Taking Mirapex 2 tablets BID       Tobacco use disorder  Had quit smoking at time of surgery this past spring.  Now has had A couple cigs in past couple days due to frustration about brace, legs.  Wants to quit.  No EToH use      Problem list and histories reviewed & adjusted, as indicated.  Additional history: as documented      Reviewed and updated as needed this visit by clinical staffTobacco  Allergies  Meds  Problems       Reviewed and updated as needed this visit by Provider  Allergies  Meds  Problems         ROS:  Constitutional, HEENT, cardiovascular, pulmonary, GI, , musculoskeletal, neuro, skin, endocrine and psych systems are negative, except as otherwise noted.      OBJECTIVE:   /74 (BP Location: Right arm, Patient Position: Chair, Cuff Size: Adult Regular)  Pulse 95  Temp 98.5  F (36.9  C) (Tympanic)  Resp 18  Wt 166 lb (75.3 kg)  SpO2 98%  Breastfeeding? No  BMI 31.37 kg/m2  Body mass index is 31.37 kg/(m^2).  GENERAL: healthy, alert and no distress  NECK: no adenopathy, no asymmetry, masses, or scars and thyroid normal to palpation  RESP: lungs clear to auscultation - no rales, rhonchi or wheezes  CV: regular rate and rhythm, normal S1 S2, no S3 or S4, no murmur, click or rub, no peripheral edema and peripheral pulses strong  MS: wearing back brace    Diagnostic Test Results:  none     ASSESSMENT/PLAN:     ASSESSMENT:  1. Displacement of lumbar intervertebral disc without myelopathy    2. Sensory integration dysfunction    3. Need for prophylactic vaccination and inoculation against influenza    4. Encounter for smoking cessation counseling    5. Tobacco abuse disorder         PLAN:  Orders Placed This Encounter     FLU VAC, SPLIT VIRUS IM > 3 YO (QUADRIVALENT) [81886]     Vaccine Administration, Initial [36888]     OCCUPATIONAL THERAPY REFERRAL     nicotine polacrilex (EQ NICOTINE POLACRILEX) 2 MG gum     DISCONTD: acetaminophen-codeine (TYLENOL #3) 300-30 MG per tablet       Patient Instructions   Schedule appointment with occupational therapy at [facility] Mary Jo Tyler, NP, APRN CNP  [facility]

## 2017-09-19 NOTE — MR AVS SNAPSHOT
"              After Visit Summary   9/19/2017    Vesta Rodriguez    MRN: [id number]           Patient Information     Date Of Birth          1960        Visit Information        Provider Department      9/19/2017 11:00 AM Tammy Tyler APRN CNP [facility]        Today's Diagnoses     Need for prophylactic vaccination and inoculation against influenza    -  1    Sensory integration dysfunction        Encounter for smoking cessation counseling        Displacement of lumbar intervertebral disc without myelopathy          Care Instructions    Schedule appointment with occupational therapy at [facility] in [place]          Follow-ups after your visit        Additional Services     OCCUPATIONAL THERAPY REFERRAL       Will be receiving sensory integrative services at [facility].      Treatment: Evaluation & Treatment  Special Instructions/Modalities: [facility] - sensory integrative services  Special Programs: None    Please be aware that coverage of these services is subject to the terms and limitations of your health insurance plan.  Call member services at your health plan with any benefit or coverage questions.      **Note to Provider:  If you are referring outside of [place] for the therapy appointment, please list the name of the location in the \"special instructions\" above, print the referral and give to the patient to schedule the appointment.                  Who to contact     If you have questions or need follow up information about today's clinic visit or your schedule please contact [facility] directly at [phone].  Normal or non-critical lab and imaging results will be communicated to you by MyChart, letter or phone within 4 business days after the clinic has received the results. If you do not hear from us within 7 days, please contact the clinic through MyChart or phone. If you have a critical or abnormal lab result, we will notify " you by phone as soon as possible.  Submit refill requests through [employer] or call your pharmacy and they will forward the refill request to us. Please allow 3 business days for your refill to be completed.          Additional Information About Your Visit        [employer] Information     [employer] gives you secure access to your electronic health record. If you see a primary care provider, you can also send messages to your care team and make appointments. If you have questions, please call your primary care clinic.  If you do not have a primary care provider, please call [phone] and they will assist you.        Care EveryWhere ID     This is your Care EveryWhere ID. This could be used by other organizations to access your [facility] medical records  [id number]        Your Vitals Were     Pulse Temperature Respirations Pulse Oximetry Breastfeeding? BMI (Body Mass Index)    95 98.5  F (36.9  C) (Tympanic) 18 98% No 31.37 kg/m2       Blood Pressure from Last 3 Encounters:   09/19/17 126/74   07/25/17 98/60   06/26/17 90/64    Weight from Last 3 Encounters:   09/19/17 166 lb (75.3 kg)   07/25/17 158 lb 6.4 oz (71.8 kg)   06/26/17 163 lb (73.9 kg)              We Performed the Following     FLU VAC, SPLIT VIRUS IM > 3 YO (QUADRIVALENT) [73578]     OCCUPATIONAL THERAPY REFERRAL     Vaccine Administration, Initial [42591]          Today's Medication Changes          These changes are accurate as of: 9/19/17 12:04 PM.  If you have any questions, ask your nurse or doctor.               Start taking these medicines.        Dose/Directions    acetaminophen-codeine 300-30 MG per tablet   Commonly known as:  TYLENOL #3   Used for:  Displacement of lumbar intervertebral disc without myelopathy   Started by:  Tammy Tyler, JAVIER CNP        Dose:  1 tablet   Take 1 tablet by mouth every 6 hours as needed for pain maximum 4 tablet(s) per day   Quantity:  30 tablet   Refills:  0       nicotine polacrilex 2 MG gum   Commonly  known as:  EQ NICOTINE POLACRILEX   Used for:  Encounter for smoking cessation counseling   Started by:  Tammy Tyler APRN CNP        Dose:  2 mg   Place 1 each (2 mg) inside cheek as needed for smoking cessation   Quantity:  30 tablet   Refills:  3            Where to get your medicines      These medications were sent to [facility] [place], [place] - [address]  [address] [place] [address]     Phone:  [phone]     nicotine polacrilex 2 MG gum         Some of these will need a paper prescription and others can be bought over the counter.  Ask your nurse if you have questions.     Bring a paper prescription for each of these medications     acetaminophen-codeine 300-30 MG per tablet                Primary Care Provider Office Phone # Fax #    JAVIER Montilla [phone]       [address] [facility] [address]        Equal Access to Services     [facility] : Hadii lore ku hadasho Soomaali, waaxda luqadaha, qaybta kaalmada adeegyada, waxay carleyin hayrajiv cox . So [facility] [phone].    ATENCIÓN: Si habla español, tiene a moses disposición servicios gratuitos de asistencia lingüística. Llame al [phone].    We comply with applicable federal civil rights laws and [place] laws. We do not discriminate on the basis of race, color, national origin, age, disability sex, sexual orientation or gender identity.            Thank you!     Thank you for choosing [facility]  for your care. Our goal is always to provide you with excellent care. Hearing back from our patients is one way we can continue to improve our services. Please take a few minutes to complete the written survey that you may receive in the mail after your visit with us. Thank you!             Your Updated Medication List - Protect others around you: Learn how to safely use, store and throw away your medicines at www.disposemymeds.org.          This list is accurate as of: 9/19/17  12:04 PM.  Always use your most recent med list.                   Brand Name Dispense Instructions for use Diagnosis    acetaminophen-codeine 300-30 MG per tablet    TYLENOL #3    30 tablet    Take 1 tablet by mouth every 6 hours as needed for pain maximum 4 tablet(s) per day    Displacement of lumbar intervertebral disc without myelopathy       fluticasone-salmeterol 100-50 MCG/DOSE diskus inhaler    ADVAIR    2 Inhaler    Inhale 1 puff into the lungs 2 times daily    Chronic obstructive pulmonary disease, unspecified COPD type (H)       insulin pen needle 31G X 5 MM    B-D U/F    100 each    Use 1 daily or as directed.    Osteoporosis       methimazole 5 MG tablet    TAPAZOLE    90 tablet    Take 1 tablet (5 mg) by mouth daily    Graves' disease       nicotine polacrilex 2 MG gum    EQ NICOTINE POLACRILEX    30 tablet    Place 1 each (2 mg) inside cheek as needed for smoking cessation    Encounter for smoking cessation counseling       order for DME      Equipment being ordered: CPAP        * order for DME     1 Device    Equipment being ordered: Aqua K pad and pump. Diagnosis of progressive back pain with history of L3- sacral fusion, disc extrusion with impingement at L2-3 and L1-2.    Intervertebral disc prolapse with impingement       * order for DME     1 Device    Shower Chair-patient weight 170#    Displacement of lumbar intervertebral disc without myelopathy       * order for DME     1 Device    GRAB BAR    Displacement of cervical intervertebral disc without myelopathy, Brachial neuritis or radiculitis, History of back surgery       * order for DME     1 Units    Blood pressure monitor    Benign essential hypertension       pramipexole 1.5 MG tablet    MIRAPEX    180 tablet    Take 1 tablet (1.5 mg) by mouth 2 times daily    Restless leg syndrome       rOPINIRole 0.25 MG tablet    REQUIP    60 tablet    Take 1-2 tablets (0.25-0.5 mg) by mouth At Bedtime    Restless leg syndrome       teriparatide  (recombinant) 600 MCG/2.4ML Soln injection    FORTEO    1 mL    Inject 0.08 mLs (20 mcg) Subcutaneous daily    Osteoporosis       VISTARIL 25 MG capsule   Generic drug:  hydrOXYzine      Take 25 mg by mouth 3 times daily as needed for itching    Graves disease       * Notice:  This list has 4 medication(s) that are the same as other medications prescribed for you. Read the directions carefully, and ask your doctor or other care provider to review them with you.

## 2017-09-19 NOTE — PROGRESS NOTES
Injectable Influenza Immunization Documentation    1.  Is the person to be vaccinated sick today? No    2. Does the person to be vaccinated have an allergy to a component   of the vaccine? No  3. Has the person to be vaccinated ever had a serious reaction   to influenza vaccine in the past? No    4. Has the person to be vaccinated ever had Guillain-Barré syndrome? No    Form completed by Sarah [facility]

## 2017-10-09 DIAGNOSIS — G25.81 RESTLESS LEG SYNDROME: ICD-10-CM

## 2017-10-09 NOTE — TELEPHONE ENCOUNTER
pramipexole (MIRAPEX) 1.5 MG tablet     Last Written Prescription Date: 07/19/2017  Last Fill Quantity: 180 TABLET, # refills: 0  Last Office Visit with [facility], [facility] or [facility] prescribing provider: 09/19/2017        BP Readings from Last 3 Encounters:   09/19/17 126/74   07/25/17 98/60   06/26/17 90/64

## 2017-10-10 DIAGNOSIS — G25.81 RESTLESS LEG SYNDROME: ICD-10-CM

## 2017-10-10 RX ORDER — PRAMIPEXOLE DIHYDROCHLORIDE 1.5 MG/1
1.5 TABLET ORAL 2 TIMES DAILY
Qty: 180 TABLET | Refills: 0 | Status: SHIPPED | OUTPATIENT
Start: 2017-10-10 | End: 2018-01-05

## 2017-10-18 ENCOUNTER — OFFICE VISIT (OUTPATIENT)
Dept: FAMILY MEDICINE | Facility: CLINIC | Age: 57
End: 2017-10-18
Payer: MEDICARE

## 2017-10-18 VITALS
SYSTOLIC BLOOD PRESSURE: 128 MMHG | TEMPERATURE: 98.2 F | RESPIRATION RATE: 18 BRPM | BODY MASS INDEX: 31.25 KG/M2 | HEART RATE: 92 BPM | DIASTOLIC BLOOD PRESSURE: 74 MMHG | WEIGHT: 165.4 LBS | OXYGEN SATURATION: 100 %

## 2017-10-18 DIAGNOSIS — N15.9 KIDNEY INFECTION: ICD-10-CM

## 2017-10-18 DIAGNOSIS — Z13.220 SCREENING FOR HYPERLIPIDEMIA: ICD-10-CM

## 2017-10-18 DIAGNOSIS — E05.00 GRAVES' DISEASE: ICD-10-CM

## 2017-10-18 DIAGNOSIS — Z71.6 TOBACCO ABUSE COUNSELING: ICD-10-CM

## 2017-10-18 DIAGNOSIS — Z87.440 PERSONAL HISTORY OF URINARY TRACT INFECTION: Primary | ICD-10-CM

## 2017-10-18 LAB
ALBUMIN UR-MCNC: NEGATIVE MG/DL
APPEARANCE UR: CLEAR
BILIRUB UR QL STRIP: NEGATIVE
COLOR UR AUTO: YELLOW
GLUCOSE UR STRIP-MCNC: NEGATIVE MG/DL
HGB BLD-MCNC: 12.2 G/DL (ref 11.7–15.7)
HGB UR QL STRIP: NEGATIVE
KETONES UR STRIP-MCNC: NEGATIVE MG/DL
LEUKOCYTE ESTERASE UR QL STRIP: ABNORMAL
NITRATE UR QL: NEGATIVE
NON-SQ EPI CELLS #/AREA URNS LPF: ABNORMAL /LPF
PH UR STRIP: 7 PH (ref 5–7)
RBC #/AREA URNS AUTO: ABNORMAL /HPF
SOURCE: ABNORMAL
SP GR UR STRIP: 1.01 (ref 1–1.03)
UROBILINOGEN UR STRIP-ACNC: 0.2 EU/DL (ref 0.2–1)
WBC #/AREA URNS AUTO: ABNORMAL /HPF

## 2017-10-18 PROCEDURE — 80061 LIPID PANEL: CPT | Performed by: NURSE PRACTITIONER

## 2017-10-18 PROCEDURE — 36415 COLL VENOUS BLD VENIPUNCTURE: CPT | Performed by: NURSE PRACTITIONER

## 2017-10-18 PROCEDURE — 81001 URINALYSIS AUTO W/SCOPE: CPT | Performed by: NURSE PRACTITIONER

## 2017-10-18 PROCEDURE — 99214 OFFICE O/P EST MOD 30 MIN: CPT | Performed by: NURSE PRACTITIONER

## 2017-10-18 PROCEDURE — 87086 URINE CULTURE/COLONY COUNT: CPT | Performed by: NURSE PRACTITIONER

## 2017-10-18 PROCEDURE — 85018 HEMOGLOBIN: CPT | Performed by: NURSE PRACTITIONER

## 2017-10-18 RX ORDER — NITROFURANTOIN 25; 75 MG/1; MG/1
100 CAPSULE ORAL 2 TIMES DAILY
Qty: 14 CAPSULE | Refills: 0 | Status: SHIPPED | OUTPATIENT
Start: 2017-10-18 | End: 2018-02-07

## 2017-10-18 NOTE — MR AVS SNAPSHOT
After Visit Summary   10/18/2017    Vesta Rodriguez    MRN: [id number]           Patient Information     Date Of Birth          1960        Visit Information        Provider Department      10/18/2017 12:40 PM Tammy Tyler APRN CNP [facility]        Today's Diagnoses     Personal history of urinary tract infection    -  1    Graves' disease        Screening for hyperlipidemia          Care Instructions    Complete full course of antibiotics     Increase fluid intake    We will call you with the results of your labs     Chantix will have dosing instructions on the package      Complete and mail FIT (stool) test for colon cancer screening  You are due for your mammogram, please schedule: [place] AntonioZoit=[phone], DeckerVgvc=[phone], DolliverQgdijx=[phone], [facility] [phone] and Rggswii=[phone]          Follow-ups after your visit        Who to contact     If you have questions or need follow up information about today's clinic visit or your schedule please contact [facility] directly at [phone].  Normal or non-critical lab and imaging results will be communicated to you by SimpleSitehart, letter or phone within 4 business days after the clinic has received the results. If you do not hear from us within 7 days, please contact the clinic through [place] or phone. If you have a critical or abnormal lab result, we will notify you by phone as soon as possible.  Submit refill requests through [employer] or call your pharmacy and they will forward the refill request to us. Please allow 3 business days for your refill to be completed.          Additional Information About Your Visit        MyChart Information     [employer] gives you secure access to your electronic health record. If you see a primary care provider, you can also send messages to your care team and make appointments. If you have questions, please call your primary care clinic.  If you do not have  a primary care provider, please call [phone] and they will assist you.        Care EveryWhere ID     This is your Care EveryWhere ID. This could be used by other organizations to access your [place] medical records  [id number]        Your Vitals Were     Pulse Temperature Respirations Pulse Oximetry Breastfeeding? BMI (Body Mass Index)    92 98.2  F (36.8  C) (Tympanic) 18 100% No 31.25 kg/m2       Blood Pressure from Last 3 Encounters:   10/18/17 128/74   09/19/17 126/74   07/25/17 98/60    Weight from Last 3 Encounters:   10/18/17 165 lb 6.4 oz (75 kg)   09/19/17 166 lb (75.3 kg)   07/25/17 158 lb 6.4 oz (71.8 kg)              We Performed the Following     *UA reflex to Microscopic and Culture ([place] [facility] (except [place] and [place])     Hemoglobin     Lipid Profile     Urine Microscopic          Today's Medication Changes          These changes are accurate as of: 10/18/17  1:23 PM.  If you have any questions, ask your nurse or doctor.               Start taking these medicines.        Dose/Directions    nitroFURantoin (macrocrystal-monohydrate) 100 MG capsule   Commonly known as:  MACROBID   Used for:  Personal history of urinary tract infection   Started by:  Tammy Tyler APRN CNP        Dose:  100 mg   Take 1 capsule (100 mg) by mouth 2 times daily   Quantity:  14 capsule   Refills:  0       varenicline 0.5 MG X 11 & 1 MG X 42 tablet   Commonly known as:  CHANTIX STARTING MONTH OTILIA   Used for:  Personal history of urinary tract infection   Started by:  Tammy Tyler APRN CNP        Take 0.5 mg tab daily for 3 days, then 0.5 mg tab twice daily for 4 days, then 1 mg twice daily.   Quantity:  53 tablet   Refills:  0            Where to get your medicines      These medications were sent to [id number] - [place], [place] - [address]  [address] [place] [address] [address]     Phone:  [phone]     nitroFURantoin (macrocrystal-monohydrate) 100 MG capsule    varenicline 0.5  MG X 11 & 1 MG X 42 tablet                Primary Care Provider Office Phone # Fax #    Tammy Tyler, APRN [facility] [phone]       [address] [place] [address]        Equal Access to Services     HUBERT ZAVALA : Hadii lore ku hadorlandoo Soomaali, waaxda luqadaha, qaybta kaalmada adeegyada, waxdhruv idiin andin adeuli henson brooke pacheco. So [facility] [phone].    ATENCIÓN: Si habla español, tiene a moses disposición servicios gratuitos de asistencia lingüística. Llame al [phone].    We comply with applicable federal civil rights laws and [place] laws. We do not discriminate on the basis of race, color, national origin, age, disability, sex, sexual orientation, or gender identity.            Thank you!     Thank you for choosing [facility]  for your care. Our goal is always to provide you with excellent care. Hearing back from our patients is one way we can continue to improve our services. Please take a few minutes to complete the written survey that you may receive in the mail after your visit with us. Thank you!             Your Updated Medication List - Protect others around you: Learn how to safely use, store and throw away your medicines at www.disposemymeds.org.          This list is accurate as of: 10/18/17  1:23 PM.  Always use your most recent med list.                   Brand Name Dispense Instructions for use Diagnosis    fluticasone-salmeterol 100-50 MCG/DOSE diskus inhaler    ADVAIR    2 Inhaler    Inhale 1 puff into the lungs 2 times daily    Chronic obstructive pulmonary disease, unspecified COPD type (H)       insulin pen needle 31G X 5 MM    B-D U/F    100 each    Use 1 daily or as directed.    Osteoporosis       methimazole 5 MG tablet    TAPAZOLE    90 tablet    Take 1 tablet (5 mg) by mouth daily    Graves' disease       nicotine polacrilex 2 MG gum    EQ NICOTINE POLACRILEX    30 tablet    Place 1 each (2 mg) inside cheek as needed for smoking cessation     Encounter for smoking cessation counseling       nitroFURantoin (macrocrystal-monohydrate) 100 MG capsule    MACROBID    14 capsule    Take 1 capsule (100 mg) by mouth 2 times daily    Personal history of urinary tract infection       order for DME      Equipment being ordered: CPAP        * order for DME     1 Device    Equipment being ordered: Aqua K pad and pump. Diagnosis of progressive back pain with history of L3- sacral fusion, disc extrusion with impingement at L2-3 and L1-2.    Intervertebral disc prolapse with impingement       * order for DME     1 Device    Shower Chair-patient weight 170#    Displacement of lumbar intervertebral disc without myelopathy       * order for DME     1 Device    GRAB BAR    Displacement of cervical intervertebral disc without myelopathy, Brachial neuritis or radiculitis, History of back surgery       * order for DME     1 Units    Blood pressure monitor    Benign essential hypertension       pramipexole 1.5 MG tablet    MIRAPEX    180 tablet    Take 1 tablet (1.5 mg) by mouth 2 times daily    Restless leg syndrome       teriparatide (recombinant) 600 MCG/2.4ML Soln injection    FORTEO    1 mL    Inject 0.08 mLs (20 mcg) Subcutaneous daily    Osteoporosis       varenicline 0.5 MG X 11 & 1 MG X 42 tablet    CHANTIX STARTING MONTH OTILIA    53 tablet    Take 0.5 mg tab daily for 3 days, then 0.5 mg tab twice daily for 4 days, then 1 mg twice daily.    Personal history of urinary tract infection       VISTARIL 25 MG capsule   Generic drug:  hydrOXYzine      Take 25 mg by mouth 3 times daily as needed for itching    Graves disease       * Notice:  This list has 4 medication(s) that are the same as other medications prescribed for you. Read the directions carefully, and ask your doctor or other care provider to review them with you.

## 2017-10-18 NOTE — PROGRESS NOTES
SUBJECTIVE:   Vesta Rodriguez is a 56 year old female who presents to clinic today for the following health issues:    ED/UC Followup:    Facility:  [facility]  Date of visit: 10/14/2017  Reason for visit: kidney infection  Current Status: continued sx: some burning with urination but just really doesn't feel well despite treatment Levaquin 750 mg daily for 4 days  CT=Mild prominence of the right renal pelvis, of uncertain significance. This may be normal for the patient or related to a recently passed stone.  No ureteral stone demonstrated.        Continues to have pain- a little worse, hurts when walks.    No fever.    Little bit of burning. Urgency- yes    No discomfort when urinating.        Smoking Cessation      Duration: since age 19    Description (location/character/radiation): did quit in May but restarted 2 weeks ago, smoking 4 daily    Intensity:  mild    Accompanying signs and symptoms: none    History (similar episodes/previous evaluation): None    Precipitating or alleviating factors: None    Therapies tried and outcome: quit in May by using nicotine gum, would like to try Chantix     Previously quit at time of her back surgery. Knows that she should not be smoking. Surgeon Dr. Alonso has talked with her about this as well      Problem list and histories reviewed & adjusted, as indicated.  Additional history: as documented    Patient Active Problem List   Diagnosis     Brachial neuritis or radiculitis     Osteoporosis     Displacement of lumbar intervertebral disc without myelopathy     Restless legs syndrome (RLS)     Disturbance in sleep behavior     Displacement of cervical intervertebral disc without myelopathy     Anxiety     CARDIOVASCULAR SCREENING; LDL GOAL LESS THAN 130     Tobacco user     Family history of breast cancer in mother     Vitamin D deficiency     Chronic cholecystitis     Right ureteral stone     Thyroid nodule; on outsie dc,due for f/u 5/13     H/O vertebroplasty     COPD  (chronic obstructive pulmonary disease); spirometry 3/10 ; rare use of mdi     Alcoholism (H)     Graves disease     S/P cholecystectomy     Major depressive disorder, recurrent episode, moderate (H)     Advance Care Planning     Past Surgical History:   Procedure Laterality Date     FUSION CERVICAL ANTERIOR TWO LEVELS      C 5-7     FUSION LUMBAR ANTERIOR THREE+ LEVELS      L3- SI     HYSTERECTOMY, DEYSI      no oophorectomy     INJECT EPIDURAL TRANSFORAMINAL Bilateral 3/9/2017    Procedure: INJECT EPIDURAL TRANSFORAMINAL;  Surgeon: Pérez Valle MD;  Location: [facility] OR     LAPAROSCOPIC CHOLECYSTECTOMY  2012    Procedure: LAPAROSCOPIC CHOLECYSTECTOMY;  Laparoscopic vs Open Cholecystectomy;  Surgeon: Esperanza Quinn MD;  Location: [facility]     LASER HOLMIUM LITHOTRIPSY URETER(S), INSERT STENT, COMBINED  2012    Procedure: COMBINED CYSTOSCOPY, URETEROSCOPY, LASER HOLMIUM LITHOTRIPSY URETER(S), INSERT STENT;  Cystoscopy, Right Ureteroscopy Holmium Laser Lithotripsy , right ureteral stent placement *Latex Safe*;  Surgeon: Joshua Rm MD;  Location: [facility] OR     SURGICAL HISTORY OF -        section     SURGICAL HISTORY OF -       Tubal ligation     SURGICAL HISTORY OF -       Post hip fracture     SURGICAL HISTORY OF -       hardware removal, right iliac crest      VERTEBROPLASTY         Social History   Substance Use Topics     Smoking status: Current Some Day Smoker     Packs/day: 0.20     Types: Cigarettes     Last attempt to quit: 2017     Smokeless tobacco: Never Used     Alcohol use No     Family History   Problem Relation Age of Onset     HEART DISEASE Brother      Breast Cancer Mother       age 31     Alcohol/Drug Other              Reviewed and updated as needed this visit by clinical staffTobacco  Allergies  Meds  Problems       Reviewed and updated as needed this visit by Provider  Allergies  Meds  Problems         ROS:  Constitutional, HEENT,  cardiovascular, pulmonary, GI, , musculoskeletal, neuro, skin, endocrine and psych systems are negative, except as otherwise noted.      OBJECTIVE:   /74  Pulse 92  Temp 98.2  F (36.8  C) (Tympanic)  Resp 18  Wt 165 lb 6.4 oz (75 kg)  SpO2 100%  Breastfeeding? No  BMI 31.25 kg/m2  Body mass index is 31.25 kg/(m^2).  GENERAL: healthy, alert, no distress and wearing back brace  NECK: no adenopathy, no asymmetry, masses, or scars and thyroid normal to palpation  RESP: lungs clear to auscultation - no rales, rhonchi or wheezes  CV: regular rate and rhythm, normal S1 S2, no S3 or S4, no murmur, click or rub, no peripheral edema and peripheral pulses strong  MS: no gross musculoskeletal defects noted, no edema    Diagnostic Test Results:  Results for orders placed or performed in visit on 10/18/17   *UA reflex to Microscopic and Culture ([place] [facility] (except [place] and [place])   Result Value Ref Range    Color Urine Yellow     Appearance Urine Clear     Glucose Urine Negative NEG^Negative mg/dL    Bilirubin Urine Negative NEG^Negative    Ketones Urine Negative NEG^Negative mg/dL    Specific Gravity Urine 1.015 1.003 - 1.035    Blood Urine Negative NEG^Negative    pH Urine 7.0 5.0 - 7.0 pH    Protein Albumin Urine Negative NEG^Negative mg/dL    Urobilinogen Urine 0.2 0.2 - 1.0 EU/dL    Nitrite Urine Negative NEG^Negative    Leukocyte Esterase Urine Small (A) NEG^Negative    Source Midstream Urine    Lipid Profile   Result Value Ref Range    Cholesterol 240 (H) <200 mg/dL    Triglycerides 175 (H) <150 mg/dL    HDL Cholesterol 58 >49 mg/dL    LDL Cholesterol Calculated 147 (H) <100 mg/dL    Non HDL Cholesterol 182 (H) <130 mg/dL   Urine Microscopic   Result Value Ref Range    WBC Urine 2-5 (A) OTO2^O - 2 /HPF    RBC Urine O - 2 OTO2^O - 2 /HPF    Squamous Epithelial /LPF Urine Few FEW^Few /LPF   Hemoglobin   Result Value Ref Range    Hemoglobin 12.2 11.7 - 15.7 g/dL   Urine Culture Aerobic  Bacterial   Result Value Ref Range    Specimen Description Midstream Urine     Culture Micro No growth        ASSESSMENT/PLAN:     ASSESSMENT:  1. Personal history of urinary tract infection    2. Kidney infection    3. Graves' disease    4. Screening for hyperlipidemia    5. Tobacco abuse counseling        PLAN:  Orders Placed This Encounter     *UA reflex to Microscopic and Culture ([facility] (except [place] and [place])     Lipid Profile     Urine Microscopic     Hemoglobin     varenicline (CHANTIX STARTING MONTH OTILIA) 0.5 MG X 11 & 1 MG X 42 tablet     nitroFURantoin, macrocrystal-monohydrate, (MACROBID) 100 MG capsule       Patient Instructions   Complete full course of antibiotics     Increase fluid intake    We will call you with the results of your labs     Chantix will have dosing instructions on the package      Complete and mail FIT (stool) test for colon cancer screening  You are due for your mammogram, please schedule: [place]indaleZzst=[phone], [place] PointNvse=[phone], OnecoYuxehj=[phone], [facility] [phone] and Uxzctzf=[phone]      Tammy Tyler NP, APRN [facility]

## 2017-10-18 NOTE — NURSING NOTE
"Chief Complaint   Patient presents with     Smoking Cessation     Flank Pain       Initial Breastfeeding? No Estimated body mass index is 31.37 kg/(m^2) as calculated from the following:    Height as of 3/9/17: 5' 1\" (1.549 m).    Weight as of 9/19/17: 166 lb (75.3 kg).  Medication Reconciliation: complete    Health Maintenance that is potentially due pending provider review:  Mammogram and Colonoscopy/FIT    Gave pt phone number/pended order to schedule mammo and/or colonoscopy(or FIT)    Is there anyone who you would like to be able to receive your results? No  If yes have patient fill out JASIEL    "

## 2017-10-18 NOTE — PATIENT INSTRUCTIONS
Complete full course of antibiotics     Increase fluid intake    We will call you with the results of your labs     Maximus will have dosing instructions on the package      Complete and mail FIT (stool) test for colon cancer screening  You are due for your mammogram, please schedule: [place] ValleyEgag=[phone], GregoryRstb=[phone], MalmoSxftkg=[phone], [facility] [phone] and Qxoxuji=[phone]

## 2017-10-19 LAB
CHOLEST SERPL-MCNC: 240 MG/DL
HDLC SERPL-MCNC: 58 MG/DL
LDLC SERPL CALC-MCNC: 147 MG/DL
NONHDLC SERPL-MCNC: 182 MG/DL
TRIGL SERPL-MCNC: 175 MG/DL

## 2017-10-20 ENCOUNTER — TELEPHONE (OUTPATIENT)
Dept: FAMILY MEDICINE | Facility: CLINIC | Age: 57
End: 2017-10-20

## 2017-10-20 LAB
BACTERIA SPEC CULT: NO GROWTH
SPECIMEN SOURCE: NORMAL

## 2017-10-26 NOTE — TELEPHONE ENCOUNTER
Medicare Blue Rx- Approved Chantix Starting Month Jonathan - 7/25/17-4/21/18  Rajiv notified  [facility]

## 2017-10-31 ENCOUNTER — TRANSFERRED RECORDS (OUTPATIENT)
Dept: HEALTH INFORMATION MANAGEMENT | Facility: CLINIC | Age: 57
End: 2017-10-31

## 2017-11-02 ENCOUNTER — TRANSFERRED RECORDS (OUTPATIENT)
Dept: HEALTH INFORMATION MANAGEMENT | Facility: CLINIC | Age: 57
End: 2017-11-02

## 2017-11-30 ENCOUNTER — TRANSFERRED RECORDS (OUTPATIENT)
Dept: HEALTH INFORMATION MANAGEMENT | Facility: CLINIC | Age: 57
End: 2017-11-30

## 2017-12-01 ENCOUNTER — MYC MEDICAL ADVICE (OUTPATIENT)
Dept: FAMILY MEDICINE | Facility: CLINIC | Age: 57
End: 2017-12-01

## 2018-01-05 ENCOUNTER — MYC MEDICAL ADVICE (OUTPATIENT)
Dept: FAMILY MEDICINE | Facility: CLINIC | Age: 58
End: 2018-01-05

## 2018-01-05 DIAGNOSIS — G25.81 RESTLESS LEG SYNDROME: ICD-10-CM

## 2018-01-05 RX ORDER — PRAMIPEXOLE DIHYDROCHLORIDE 1.5 MG/1
1.5 TABLET ORAL 2 TIMES DAILY
Qty: 180 TABLET | Refills: 0 | Status: SHIPPED | OUTPATIENT
Start: 2018-01-05 | End: 2018-04-09

## 2018-01-05 NOTE — TELEPHONE ENCOUNTER
Routing refill request to provider for review/approval because:  Warning coming up on dosage exceeds maximum.  Please advise refill.  LISSETT Martinez

## 2018-02-07 ENCOUNTER — OFFICE VISIT (OUTPATIENT)
Dept: FAMILY MEDICINE | Facility: CLINIC | Age: 58
End: 2018-02-07
Payer: MEDICARE

## 2018-02-07 VITALS
HEART RATE: 86 BPM | TEMPERATURE: 97.8 F | SYSTOLIC BLOOD PRESSURE: 130 MMHG | WEIGHT: 170 LBS | BODY MASS INDEX: 32.1 KG/M2 | RESPIRATION RATE: 18 BRPM | OXYGEN SATURATION: 98 % | DIASTOLIC BLOOD PRESSURE: 72 MMHG | HEIGHT: 61 IN

## 2018-02-07 DIAGNOSIS — G25.81 RESTLESS LEGS SYNDROME (RLS): ICD-10-CM

## 2018-02-07 DIAGNOSIS — Z72.0 TOBACCO ABUSE: ICD-10-CM

## 2018-02-07 DIAGNOSIS — R32 INCONTINENCE IN FEMALE: ICD-10-CM

## 2018-02-07 DIAGNOSIS — Z71.6 ENCOUNTER FOR SMOKING CESSATION COUNSELING: Primary | ICD-10-CM

## 2018-02-07 DIAGNOSIS — F33.1 MAJOR DEPRESSIVE DISORDER, RECURRENT EPISODE, MODERATE (H): ICD-10-CM

## 2018-02-07 LAB
ALBUMIN UR-MCNC: NEGATIVE MG/DL
APPEARANCE UR: CLEAR
BILIRUB UR QL STRIP: NEGATIVE
COLOR UR AUTO: YELLOW
GLUCOSE UR STRIP-MCNC: NEGATIVE MG/DL
HGB UR QL STRIP: NEGATIVE
KETONES UR STRIP-MCNC: NEGATIVE MG/DL
LEUKOCYTE ESTERASE UR QL STRIP: NEGATIVE
NITRATE UR QL: NEGATIVE
PH UR STRIP: 7 PH (ref 5–7)
SOURCE: NORMAL
SP GR UR STRIP: 1.01 (ref 1–1.03)
UROBILINOGEN UR STRIP-ACNC: 0.2 EU/DL (ref 0.2–1)

## 2018-02-07 PROCEDURE — 99214 OFFICE O/P EST MOD 30 MIN: CPT | Performed by: NURSE PRACTITIONER

## 2018-02-07 PROCEDURE — 81003 URINALYSIS AUTO W/O SCOPE: CPT | Performed by: NURSE PRACTITIONER

## 2018-02-07 RX ORDER — BUPROPION HYDROCHLORIDE 150 MG/1
150 TABLET, EXTENDED RELEASE ORAL DAILY
Qty: 180 TABLET | Refills: 3 | Status: SHIPPED | OUTPATIENT
Start: 2018-02-07 | End: 2018-05-09

## 2018-02-07 RX ORDER — PREGABALIN 75 MG/1
75 CAPSULE ORAL 2 TIMES DAILY
Qty: 90 CAPSULE | Refills: 3 | Status: SHIPPED | OUTPATIENT
Start: 2018-02-07 | End: 2018-03-09

## 2018-02-07 ASSESSMENT — PATIENT HEALTH QUESTIONNAIRE - PHQ9: 5. POOR APPETITE OR OVEREATING: MORE THAN HALF THE DAYS

## 2018-02-07 ASSESSMENT — ANXIETY QUESTIONNAIRES
2. NOT BEING ABLE TO STOP OR CONTROL WORRYING: MORE THAN HALF THE DAYS
5. BEING SO RESTLESS THAT IT IS HARD TO SIT STILL: MORE THAN HALF THE DAYS
1. FEELING NERVOUS, ANXIOUS, OR ON EDGE: MORE THAN HALF THE DAYS
6. BECOMING EASILY ANNOYED OR IRRITABLE: MORE THAN HALF THE DAYS
3. WORRYING TOO MUCH ABOUT DIFFERENT THINGS: MORE THAN HALF THE DAYS
7. FEELING AFRAID AS IF SOMETHING AWFUL MIGHT HAPPEN: MORE THAN HALF THE DAYS
GAD7 TOTAL SCORE: 14

## 2018-02-07 NOTE — MR AVS SNAPSHOT
After Visit Summary   2/7/2018    Vesta Rodriguez    MRN: [id number]           Patient Information     Date Of Birth          1960        Visit Information        Provider Department      2/7/2018 1:40 PM Tammy Tyler APRN [facility]        Today's Diagnoses     Incontinence in female    -  1    Tobacco abuse        Tobacco abuse counseling        Encounter for smoking cessation counseling        Displacement of lumbar intervertebral disc without myelopathy          Care Instructions    Start the Wellbutrin at least a week prior to your quit date.    Please call after your appointment tomorrow with an update.     HOW TO QUIT SMOKING  Smoking is one of the hardest habits to break. About half of all those who have ever smoked have been able to quit, and most of those (about 70%) who still smoke want to quit. Here are some of the best ways to stop smoking.     KEEP TRYING:  It takes most smokers about 8 tries before they are finally able to fully quit. So, the more often you try and fail, the better your chance of quitting the next time! So, don't give up!    GO COLD TURKEY:  Most ex-smokers quit cold turkey. Trying to cut back gradually doesn't seem to work as well, perhaps because it continues the smoking habit. Also, it is possible to fool yourself by inhaling more while smoking fewer cigarettes. This results in the same amount of nicotine in your body!    GET SUPPORT:  Support programs can make an important difference, especially for the heavy smoker. These groups offer lectures, methods to change your behavior and peer support. Call the free national Quitline for more information. 800-QUIT-NOW (569-489-2757). Low-cost or free programs are offered by many hospitals, local chapters of the American Lung Association (090-805-0902) and the American Cancer Society (985-911-6230). Support at home is important too. Non-smokers can help by offering praise and encouragement.  If the smoker fails to quit, encourage them to try again!    OVER-THE-COUNTER MEDICINES:  For those who can't quit on their own, Nicotine Replacement Therapy (NRT) may make quitting much easier. Certain aids such as the nicotine patch, gum and lozenge are available without a prescription. However, it is best to use these under the guidance of your doctor. The skin patch provides a steady supply of nicotine to the body. Nicotine gum and lozenge gives temporary bursts of low levels of nicotine. Both methods take the edge off the craving for cigarettes. WARNING: If you feel symptoms of nicotine overdose, such as nausea, vomiting, dizziness, weakness, or fast heartbeat, stop using these and see your doctor.    PRESCRIPTION MEDICINES:  After evaluating your smoking patterns and prior attempts at quitting, your doctor may offer a prescription medicine such as bupropion (Zyban, Wellbutrin), varenicline (Chantix, Champix), a niocotine inhaler or nasal spray. Each has its unique advantage and side effects which your doctor can review with you.    HEALTH BENEFITS OF QUITTING:  The benefits of quitting start right away and keep improving the longer you go without smokin minutes: blood pressure and pulse return to normal  8 hours: oxygen levels return to normal  2 days: ability to smell and taste begins to improve as damaged nerves start to regrow  2-3 weeks: circulation and lung function improves  1-9 months: decreased cough, congestion and shortness of breath; less tired  1 year: risk of heart attack decreases by half  5 years: risk of lung cancer decreases by half; risk of stroke becomes the same as a non-smoker  For information about how to quit smoking, visit the following links:  National Cancer Echo ,   Clearing the Air, Quit Smoking Today   - an online booklet. http://www.smokefree.gov/pubs/clearing_the_air.pdf  Smokefree.gov http://smokefree.gov/  QuitNet http://www.quitnet.com/    9392-5927 Jose Elmore  [address] [place] [address]. All rights reserved. This information is not intended as a substitute for professional medical care. Always follow your healthcare professional's instructions.    The Benefits of Living Smoke Free  What do you want to gain from quitting? Check off some reasons to quit.  Health Benefits  ___ Reduce my risk of lung cancer, heart disease, chronic lung disease  ___ Have fewer wrinkles and softer skin  ___ Improve my sense of taste and smell  ___ For pregnant women--reduce the risk of having a miscarriage, stillbirth, premature birth, or low-birth-weight baby  Personal Benefits  ___ Feel more in control of my life  ___ Have better-smelling hair, breath, clothes, home, and car  ___ Save time by not having to take smoke breaks, buy cigarettes, or hunt for a light  ___ Have whiter teeth  Family Benefits  ___ Reduce my children s respiratory tract infections  ___ Set a good example for my children  ___ Reduce my family s cancer risk  Financial Benefits  ___ Save hundreds of dollars each year that would be spent on cigarettes  ___ Save money on medical bills  ___ Save on life, health, and car insurance premiums    Those Dollars Add Up!  Cigarettes are expensive, and getting more expensive all the time. Do you realize how much money you are spending on cigarettes per year? What is the average amount you spend on a pack of cigarettes? What is the average number of packs that you smoke per day? Using your answers to these questions, fill in this formula to help you find out:  ($ _____ per pack) ×  ( _____ number of packs per day) × (365 days) =  $ _____ yearly cost of smoking  Besides tobacco, there are other costs, including extra cleaning bills and replacement costs for clothing and furniture; medical expenses for smoking-related illnesses; and higher health, life, and car insurance premiums.    Cigars and Pipes Count Too!  Cigars and pipes are also dangerous. So are smokeless  (chewing) tobacco and snuff. All of these products contain nicotine, a highly addictive substance that has harmful effects on your body. Quitting smoking means giving up all tobacco products.      0208-3212 Jose Elmore, [address] [place] [address], [place] [address]. All rights reserved. This information is not intended as a substitute for professional medical care. Always follow your healthcare professional's instructions.  Bupropion sustained-release tablets (smoking cessation)  Brand Names: Bupbrennon, Zsuleman  What is this medicine?  BUPROPION (byoo PROE pee on) is used to help people quit smoking.  How should I use this medicine?  Take this medicine by mouth with a glass of water. Follow the directions on the prescription label. You can take it with or without food. If it upsets your stomach, take it with food. Do not cut, crush or chew this medicine. Take your medicine at regular intervals. If you take this medicine more than once a day, take your second dose at least 8 hours after you take your first dose. To limit difficulty in sleeping, avoid taking this medicine at bedtime. Do not take your medicine more often than directed. Do not stop taking this medicine suddenly except upon the advice of your doctor. Stopping this medicine too quickly may cause serious side effects.  A special MedGuide will be given to you by the pharmacist with each prescription and refill. Be sure to read this information carefully each time.  Talk to your pediatrician regarding the use of this medicine in children. Special care may be needed.  What side effects may I notice from receiving this medicine?  Side effects that you should report to your doctor or health care professional as soon as possible:    allergic reactions like skin rash, itching or hives, swelling of the face, lips, or tongue    breathing problems    changes in vision    confusion    elevated mood, decreased need for sleep, racing thoughts, impulsive behavior    fast  or irregular heartbeat    hallucinations, loss of contact with reality    increased blood pressure    redness, blistering, peeling or loosening of the skin, including inside the mouth    seizures    suicidal thoughts or other mood changes    unusually weak or tired    vomiting  Side effects that usually do not require medical attention (report to your doctor or health care professional if they continue or are bothersome):    constipation    headache    loss of appetite    nausea    tremors    weight loss  What may interact with this medicine?  Do not take this medicine with any of the following medications:    linezolid    MAOIs like Azilect, Carbex, Eldepryl, Marplan, Nardil, and Parnate    methylene blue (injected into a vein)    other medicines that contain bupropion like Wellbutrin  This medicine may also interact with the following medications:    alcohol    certain medicines for anxiety or sleep    certain medicines for blood pressure like metoprolol, propranolol    certain medicines for depression or psychotic disturbances    certain medicines for HIV or AIDS like efavirenz, lopinavir, nelfinavir, ritonavir    certain medicines for irregular heart beat like propafenone, flecainide    certain medicines for Parkinson's disease like amantadine, levodopa    certain medicines for seizures like carbamazepine, phenytoin, phenobarbital    cimetidine    clopidogrel    cyclophosphamide    digoxin    furazolidone    isoniazid    nicotine    orphenadrine    procarbazine    steroid medicines like prednisone or cortisone    stimulant medicines for attention disorders, weight loss, or to stay awake    tamoxifen    theophylline    thiotepa    ticlopidine    tramadol    warfarin  What if I miss a dose?  If you miss a dose, skip the missed dose and take your next tablet at the regular time. There should be at least 8 hours between doses. Do not take double or extra doses.  Where should I keep my medicine?  Keep out of the  reach of children.  Store at room temperature between 20 and 25 degrees C (68 and 77 degrees F). Protect from light. Keep container tightly closed. Throw away any unused medicine after the expiration date.  What should I tell my health care provider before I take this medicine?  They need to know if you have any of these conditions:    an eating disorder, such as anorexia or bulimia    bipolar disorder or psychosis    diabetes or high blood sugar, treated with medication    glaucoma    head injury or brain tumor    heart disease, previous heart attack, or irregular heart beat    high blood pressure    kidney or liver disease    seizures    suicidal thoughts or a previous suicide attempt    Tourette's syndrome    weight loss    an unusual or allergic reaction to bupropion, other medicines, foods, dyes, or preservatives    breast-feeding    pregnant or trying to become pregnant  What should I watch for while using this medicine?  Visit your doctor or health care professional for regular checks on your progress. This medicine should be used together with a patient support program. It is important to participate in a behavioral program, counseling, or other support program that is recommended by your health care professional.  Patients and their families should watch out for new or worsening thoughts of suicide or depression. Also watch out for sudden changes in feelings such as feeling anxious, agitated, panicky, irritable, hostile, aggressive, impulsive, severely restless, overly excited and hyperactive, or not being able to sleep. If this happens, especially at the beginning of treatment or after a change in dose, call your health care professional.  Avoid alcoholic drinks while taking this medicine. Drinking excessive alcoholic beverages, using sleeping or anxiety medicines, or quickly stopping the use of these agents while taking this medicine may increase your risk for a seizure.  Do not drive or use heavy  machinery until you know how this medicine affects you. This medicine can impair your ability to perform these tasks.  Do not take this medicine close to bedtime. It may prevent you from sleeping.  Your mouth may get dry. Chewing sugarless gum or sucking hard candy, and drinking plenty of water may help. Contact your doctor if the problem does not go away or is severe.  Do not use nicotine patches or chewing gum without the advice of your doctor or health care professional while taking this medicine. You may need to have your blood pressure taken regularly if your doctor recommends that you use both nicotine and this medicine together.  NOTE:This sheet is a summary. It may not cover all possible information. If you have questions about this medicine, talk to your doctor, pharmacist, or health care provider. Copyright  2017 [employer]                Follow-ups after your visit        Additional Services     MED THERAPY MANAGE REFERRAL       Your provider has referred you to: **[facility] Scheduling (numerous locations) [phone]   [email or web address]    Telephone visit    Reason for Referral: Patient presents with:  Smoking Cessation        The [facility] department will contact you to schedule an appointment.  You may also schedule the appointment by calling [phone].  For [facility] patients, please call [phone] to confirm/schedule your appointment on the next business day.    This service is designed to help you get the most from your medications.  A specially trained Pharmacist will work closely with you and your providers to solve any questions, concerns, issues or problems related to your medications.    Please bring all of your prescription and non-prescription medications (such as vitamins, over-the-counter medications, and herbals) or a detailed medication list to your  appointment.    If you have a glucose meter or other home monitoring information, please also bring this to your appointment (i.e. blood glucose log, blood pressure log, pain log, etc.).            QUITPLAN  Referral       [place] [employer]AC[facility] QUITLINES FAX FORM  Fax form to: 1 [phone]    The clinic will facilitate the referral to the quitline.    Provider Information:  ===============================================================  Tammy Tyler NP, APRN CNP  ID#: [id number] - [facility]: [facility] [phone] Fax: [phone]   [email or web address]  Payor: MEDICARE / Plan: MEDICARE / Product Type: Medicare /   ===============================================================    The Public Health Service Guideline does not recommend providing over-the-counter nicotine replacement therapy products without physician authorization to patients with the following conditions: pregnancy, uncontrolled high blood pressure, or cardiovascular diseases.     I authorize the [place] Tobacco Quitlines to provide over-the-counter nicotine replacement products for the patient listed below if the patient's health plan benefits cover NRT or if the patient is eligible for QUITPLAN services.    Patient Consented to:  ===============================================================  - YES - I am ready to quit tobacco and request the above information be given to the quitline so they may contact me.  I understand that one of [place]'s Tobacco Quitlines will inform my provider about my participation.  ===============================================================  Please check the BEST 3-hour call window for them to reach you: 2pm - 5pm  May we leave a message?  YES  Language Preference:  English  Phone Number: Home Phone      [phone]  Mobile          [phone]     E-mail Address:  [email or web address]    ========================================================================  FOR QUITLINE USE ONLY:  THIS INFORMATION WILL BE PROVIDED BACK TO THE PROVIDER  Contact date: __/ __/__ or ____ Did not reach after three attempts.    Outcome:  __ Enrolled in telephone counseling program  __ Declined  __ Not Reached    Stage of readiness: _______________________  Planned Quit Date: ___/ ___/ ___  Comments:      2011 [facility]   This message funded by [employer] [facility] [place], an independent licensee of the Blue Cross and Blue [employer] Association. Rev. 11/1/12                  Future tests that were ordered for you today     Open Future Orders        Priority Expected Expires Ordered    QUITPLAN  Referral Routine  4/8/2018 2/7/2018            Who to contact     If you have questions or need follow up information about today's clinic visit or your schedule please contact [facility] directly at [phone].  Normal or non-critical lab and imaging results will be communicated to you by MyChart, letter or phone within 4 business days after the clinic has received the results. If you do not hear from us within 7 days, please contact the clinic through MyChart or phone. If you have a critical or abnormal lab result, we will notify you by phone as soon as possible.  Submit refill requests through [facility] or call your pharmacy and they will forward the refill request to us. Please allow 3 business days for your refill to be completed.          Additional Information About Your Visit        [email or web address] Information     [facility] gives you secure access to your electronic health record. If you see a primary care provider, you can also send messages to your care team and make appointments. If you have questions, please call your primary care clinic.  If you do not have a primary care provider, please call [phone] and they will assist you.        Care EveryWhere ID   "   This is your Care EveryWhere ID. This could be used by other organizations to access your [facility] medical records  [id number]        Your Vitals Were     Pulse Temperature Respirations Height Pulse Oximetry Breastfeeding?    86 97.8  F (36.6  C) (Tympanic) 18 5' 1.25\" (1.556 m) 98% No    BMI (Body Mass Index)                   31.86 kg/m2            Blood Pressure from Last 3 Encounters:   02/07/18 130/72   10/18/17 128/74   09/19/17 126/74    Weight from Last 3 Encounters:   02/07/18 170 lb (77.1 kg)   10/18/17 165 lb 6.4 oz (75 kg)   09/19/17 166 lb (75.3 kg)              We Performed the Following     MED THERAPY MANAGE REFERRAL     Tobacco Cessation - Order to Satisfy Health Maintenance     UA reflex to Microscopic and Culture          Today's Medication Changes          These changes are accurate as of 2/7/18  2:37 PM.  If you have any questions, ask your nurse or doctor.               Start taking these medicines.        Dose/Directions    buPROPion 150 MG 12 hr tablet   Commonly known as:  WELLBUTRIN SR   Used for:  Encounter for smoking cessation counseling   Started by:  Tammy Tyler APRN CNP        Dose:  150 mg   Take 1 tablet (150 mg) by mouth daily for 3 days then increase to 150 mg twice daily.   Quantity:  180 tablet   Refills:  3       pregabalin 75 MG capsule   Commonly known as:  LYRICA   Used for:  Displacement of lumbar intervertebral disc without myelopathy   Started by:  Tammy Tyler APRN CNP        Dose:  75 mg   Take 1 capsule (75 mg) by mouth 2 times daily   Quantity:  90 capsule   Refills:  3         Stop taking these medicines if you haven't already. Please contact your care team if you have questions.     nicotine polacrilex 2 MG gum   Commonly known as:  EQ NICOTINE POLACRILEX   Stopped by:  Tammy Tyler APRN CNP                Where to get your medicines      These medications were sent to [id number] - [place], [place] - [address]  [address] [place] [address] " [place] [address]     Phone:  [phone]     buPROPion 150 MG 12 hr tablet         Some of these will need a paper prescription and others can be bought over the counter.  Ask your nurse if you have questions.     Bring a paper prescription for each of these medications     pregabalin 75 MG capsule                Primary Care Provider Office Phone # Fax #    JAVIER Montilla [phone]       [address] [place] [address]        Equal Access to Services     HUBERT ZAVALA : Hadii aad ku hadasho Soomaali, waaxda luqadaha, qaybta kaalmada adeegyada, waxay idiin hayaan adeeg kharash la'rajiv . So [facility] [phone].    ATENCIÓN: Si habla español, tiene a moses disposición servicios gratuitos de asistencia lingüística. [facility] [phone].    We comply with applicable federal civil rights laws and [place] laws. We do not discriminate on the basis of race, color, national origin, age, disability, sex, sexual orientation, or gender identity.            Thank you!     Thank you for choosing [facility]  for your care. Our goal is always to provide you with excellent care. Hearing back from our patients is one way we can continue to improve our services. Please take a few minutes to complete the written survey that you may receive in the mail after your visit with us. Thank you!             Your Updated Medication List - Protect others around you: Learn how to safely use, store and throw away your medicines at www.disposemymeds.org.          This list is accurate as of 2/7/18  2:37 PM.  Always use your most recent med list.                   Brand Name Dispense Instructions for use Diagnosis    buPROPion 150 MG 12 hr tablet    WELLBUTRIN SR    180 tablet    Take 1 tablet (150 mg) by mouth daily for 3 days then increase to 150 mg twice daily.    Encounter for smoking cessation counseling       fluticasone-salmeterol 100-50 MCG/DOSE diskus inhaler    ADVAIR    2 Inhaler    Inhale 1 puff  into the lungs 2 times daily    Chronic obstructive pulmonary disease, unspecified COPD type (H)       methimazole 5 MG tablet    TAPAZOLE    90 tablet    Take 1 tablet (5 mg) by mouth daily    Graves' disease       order for DME      Equipment being ordered: CPAP        * order for DME     1 Device    Equipment being ordered: Aqua K pad and pump. Diagnosis of progressive back pain with history of L3- sacral fusion, disc extrusion with impingement at L2-3 and L1-2.    Intervertebral disc prolapse with impingement       * order for DME     1 Device    Shower Chair-patient weight 170#    Displacement of lumbar intervertebral disc without myelopathy       * order for DME     1 Device    GRAB BAR    Displacement of cervical intervertebral disc without myelopathy, Brachial neuritis or radiculitis, History of back surgery       * order for DME     1 Units    Blood pressure monitor    Benign essential hypertension       pramipexole 1.5 MG tablet    MIRAPEX    180 tablet    Take 1 tablet (1.5 mg) by mouth 2 times daily    Restless leg syndrome       pregabalin 75 MG capsule    LYRICA    90 capsule    Take 1 capsule (75 mg) by mouth 2 times daily    Displacement of lumbar intervertebral disc without myelopathy       * Notice:  This list has 4 medication(s) that are the same as other medications prescribed for you. Read the directions carefully, and ask your doctor or other care provider to review them with you.

## 2018-02-07 NOTE — PATIENT INSTRUCTIONS
Start the Wellbutrin at least a week prior to your quit date.    Please call after your appointment tomorrow with an update.     HOW TO QUIT SMOKING  Smoking is one of the hardest habits to break. About half of all those who have ever smoked have been able to quit, and most of those (about 70%) who still smoke want to quit. Here are some of the best ways to stop smoking.     KEEP TRYING:  It takes most smokers about 8 tries before they are finally able to fully quit. So, the more often you try and fail, the better your chance of quitting the next time! So, don't give up!    GO COLD TURKEY:  Most ex-smokers quit cold turkey. Trying to cut back gradually doesn't seem to work as well, perhaps because it continues the smoking habit. Also, it is possible to fool yourself by inhaling more while smoking fewer cigarettes. This results in the same amount of nicotine in your body!    GET SUPPORT:  Support programs can make an important difference, especially for the heavy smoker. These groups offer lectures, methods to change your behavior and peer support. Call the free national Quitline for more information. 800-QUIT-NOW (579-159-5257). Low-cost or free programs are offered by many hospitals, local chapters of the American [employer] Association (727-705-9405) and the [employer] Cancer Society (555-193-9771). Support at home is important too. Non-smokers can help by offering praise and encouragement. If the smoker fails to quit, encourage them to try again!    OVER-THE-COUNTER MEDICINES:  For those who can't quit on their own, Nicotine Replacement Therapy (NRT) may make quitting much easier. Certain aids such as the nicotine patch, gum and lozenge are available without a prescription. However, it is best to use these under the guidance of your doctor. The skin patch provides a steady supply of nicotine to the body. Nicotine gum and lozenge gives temporary bursts of low levels of nicotine. Both methods take the edge off the craving  for cigarettes. WARNING: If you feel symptoms of nicotine overdose, such as nausea, vomiting, dizziness, weakness, or fast heartbeat, stop using these and see your doctor.    PRESCRIPTION MEDICINES:  After evaluating your smoking patterns and prior attempts at quitting, your doctor may offer a prescription medicine such as bupropion (Zyban, Wellbutrin), varenicline (Chantix, Champix), a niocotine inhaler or nasal spray. Each has its unique advantage and side effects which your doctor can review with you.    HEALTH BENEFITS OF QUITTING:  The benefits of quitting start right away and keep improving the longer you go without smokin minutes: blood pressure and pulse return to normal  8 hours: oxygen levels return to normal  2 days: ability to smell and taste begins to improve as damaged nerves start to regrow  2-3 weeks: circulation and lung function improves  1-9 months: decreased cough, congestion and shortness of breath; less tired  1 year: risk of heart attack decreases by half  5 years: risk of lung cancer decreases by half; risk of stroke becomes the same as a non-smoker  For information about how to quit smoking, visit the following links:  [employer] ,   Clearing the Air, Quit Smoking Today   - an online booklet. http://www.smokefree.gov/pubs/clearing_the_air.pdf  Smokefree.gov http://smokefree.gov/  QuitNet http://www.quitnet.com/    3100-0718 [employer], [address] [place] [address]. All rights reserved. This information is not intended as a substitute for professional medical care. Always follow your healthcare professional's instructions.    The Benefits of Living Smoke Free  What do you want to gain from quitting? Check off some reasons to quit.  Health Benefits  ___ Reduce my risk of lung cancer, heart disease, chronic lung disease  ___ Have fewer wrinkles and softer skin  ___ Improve my sense of taste and smell  ___ For pregnant women--reduce the risk of having a  miscarriage, stillbirth, premature birth, or low-birth-weight baby  Personal Benefits  ___ Feel more in control of my life  ___ Have better-smelling hair, breath, clothes, home, and car  ___ Save time by not having to take smoke breaks, buy cigarettes, or hunt for a light  ___ Have whiter teeth  Family Benefits  ___ Reduce my children s respiratory tract infections  ___ Set a good example for my children  ___ Reduce my family s cancer risk  Financial Benefits  ___ Save hundreds of dollars each year that would be spent on cigarettes  ___ Save money on medical bills  ___ Save on life, health, and car insurance premiums    Those Dollars Add Up!  Cigarettes are expensive, and getting more expensive all the time. Do you realize how much money you are spending on cigarettes per year? What is the average amount you spend on a pack of cigarettes? What is the average number of packs that you smoke per day? Using your answers to these questions, fill in this formula to help you find out:  ($ _____ per pack) ×  ( _____ number of packs per day) × (365 days) =  $ _____ yearly cost of smoking  Besides tobacco, there are other costs, including extra cleaning bills and replacement costs for clothing and furniture; medical expenses for smoking-related illnesses; and higher health, life, and car insurance premiums.    Cigars and Pipes Count Too!  Cigars and pipes are also dangerous. So are smokeless (chewing) tobacco and snuff. All of these products contain nicotine, a highly addictive substance that has harmful effects on your body. Quitting smoking means giving up all tobacco products.      6304-7373 [facility], [address] [place] [address]. All rights reserved. This information is not intended as a substitute for professional medical care. Always follow your healthcare professional's instructions.  Bupropion sustained-release tablets (smoking cessation)  Brand Names: Shirley Murcia  What is this  medicine?  BUPROPION (byoo PROE pee on) is used to help people quit smoking.  How should I use this medicine?  Take this medicine by mouth with a glass of water. Follow the directions on the prescription label. You can take it with or without food. If it upsets your stomach, take it with food. Do not cut, crush or chew this medicine. Take your medicine at regular intervals. If you take this medicine more than once a day, take your second dose at least 8 hours after you take your first dose. To limit difficulty in sleeping, avoid taking this medicine at bedtime. Do not take your medicine more often than directed. Do not stop taking this medicine suddenly except upon the advice of your doctor. Stopping this medicine too quickly may cause serious side effects.  A special MedGuide will be given to you by the pharmacist with each prescription and refill. Be sure to read this information carefully each time.  Talk to your pediatrician regarding the use of this medicine in children. Special care may be needed.  What side effects may I notice from receiving this medicine?  Side effects that you should report to your doctor or health care professional as soon as possible:    allergic reactions like skin rash, itching or hives, swelling of the face, lips, or tongue    breathing problems    changes in vision    confusion    elevated mood, decreased need for sleep, racing thoughts, impulsive behavior    fast or irregular heartbeat    hallucinations, loss of contact with reality    increased blood pressure    redness, blistering, peeling or loosening of the skin, including inside the mouth    seizures    suicidal thoughts or other mood changes    unusually weak or tired    vomiting  Side effects that usually do not require medical attention (report to your doctor or health care professional if they continue or are bothersome):    constipation    headache    loss of appetite    nausea    tremors    weight loss  What may interact  with this medicine?  Do not take this medicine with any of the following medications:    linezolid    MAOIs like Azilect, Carbex, Eldepryl, Marplan, Nardil, and Parnate    methylene blue (injected into a vein)    other medicines that contain bupropion like Wellbutrin  This medicine may also interact with the following medications:    alcohol    certain medicines for anxiety or sleep    certain medicines for blood pressure like metoprolol, propranolol    certain medicines for depression or psychotic disturbances    certain medicines for HIV or AIDS like efavirenz, lopinavir, nelfinavir, ritonavir    certain medicines for irregular heart beat like propafenone, flecainide    certain medicines for Parkinson's disease like amantadine, levodopa    certain medicines for seizures like carbamazepine, phenytoin, phenobarbital    cimetidine    clopidogrel    cyclophosphamide    digoxin    furazolidone    isoniazid    nicotine    orphenadrine    procarbazine    steroid medicines like prednisone or cortisone    stimulant medicines for attention disorders, weight loss, or to stay awake    tamoxifen    theophylline    thiotepa    ticlopidine    tramadol    warfarin  What if I miss a dose?  If you miss a dose, skip the missed dose and take your next tablet at the regular time. There should be at least 8 hours between doses. Do not take double or extra doses.  Where should I keep my medicine?  Keep out of the reach of children.  Store at room temperature between 20 and 25 degrees C (68 and 77 degrees F). Protect from light. Keep container tightly closed. Throw away any unused medicine after the expiration date.  What should I tell my health care provider before I take this medicine?  They need to know if you have any of these conditions:    an eating disorder, such as anorexia or bulimia    bipolar disorder or psychosis    diabetes or high blood sugar, treated with medication    glaucoma    head injury or brain tumor    heart  disease, previous heart attack, or irregular heart beat    high blood pressure    kidney or liver disease    seizures    suicidal thoughts or a previous suicide attempt    Tourette's syndrome    weight loss    an unusual or allergic reaction to bupropion, other medicines, foods, dyes, or preservatives    breast-feeding    pregnant or trying to become pregnant  What should I watch for while using this medicine?  Visit your doctor or health care professional for regular checks on your progress. This medicine should be used together with a patient support program. It is important to participate in a behavioral program, counseling, or other support program that is recommended by your health care professional.  Patients and their families should watch out for new or worsening thoughts of suicide or depression. Also watch out for sudden changes in feelings such as feeling anxious, agitated, panicky, irritable, hostile, aggressive, impulsive, severely restless, overly excited and hyperactive, or not being able to sleep. If this happens, especially at the beginning of treatment or after a change in dose, call your health care professional.  Avoid alcoholic drinks while taking this medicine. Drinking excessive alcoholic beverages, using sleeping or anxiety medicines, or quickly stopping the use of these agents while taking this medicine may increase your risk for a seizure.  Do not drive or use heavy machinery until you know how this medicine affects you. This medicine can impair your ability to perform these tasks.  Do not take this medicine close to bedtime. It may prevent you from sleeping.  Your mouth may get dry. Chewing sugarless gum or sucking hard candy, and drinking plenty of water may help. Contact your doctor if the problem does not go away or is severe.  Do not use nicotine patches or chewing gum without the advice of your doctor or health care professional while taking this medicine. You may need to have your  blood pressure taken regularly if your doctor recommends that you use both nicotine and this medicine together.  NOTE:This sheet is a summary. It may not cover all possible information. If you have questions about this medicine, talk to your doctor, pharmacist, or health care provider. Copyright  2017 [employer]

## 2018-02-07 NOTE — PROGRESS NOTES
SUBJECTIVE:   Vesta Rodriguez is a 57 year old female who presents to clinic today for the following health issues:      Chief Complaint   Patient presents with     Smoking Cessation     Incontinence     since spinal surgery 5/30/2017     URINARY TRACT SYMPTOMS      Duration: since surgery 5/30/2018    Description  Incontinence, sometimes leaks and couldn't even tell that had to go to the bathroom. If walks 2 blocks will urinate. Wears pad. Has urge sometimes.     Intensity:  moderate    Accompanying signs and symptoms:  Fever/chills: no   Flank pain YES  Nausea and vomiting: no   Vaginal symptoms: none  Abdominal/Pelvic Pain: no     History  History of frequent UTI's: no   History of kidney stones: yes  Sexually Active: YES  Possibility of pregnancy: No    Precipitating or alleviating factors: None    Therapies tried and outcome: none   Outcome: 0  Patient reports that about incontinence began status post lumbar fusion in May 2017.  Wonders if there was nerve damage done during procedure      Smoking      Duration: since age 19, one time quit for 7 months and last time quit for 5 months    Description (location/character/radiation): smokes    Intensity:  mild    Accompanying signs and symptoms: cough    History (similar episodes/previous evaluation): COPD    Precipitating or alleviating factors: None    Therapies tried and outcome: gum has worked in past but it now causes nausea. chantix caused irritable and depression   chantix didn't work, mood worse.   Off cigs for 5 months, started again out of boredom.  Her activity has been very limited since her surgery this past May.  This is been quite discouraging for her.  Smoking resumed due to boredom and being at home for prolonged periods of time.    Major depression  Has been discouraged regarding the outcome of her surgery.  Knowing the difficulty that she is having now, would not have decided to have the surgery.  Discussed use of bupropion not only for smoking  cessation but also for mood.    Restless leg syndrome  Adequately managed with Lyrica.  Needs refill.      Problem list and histories reviewed & adjusted, as indicated.  Additional history: as documented    Patient Active Problem List   Diagnosis     Brachial neuritis or radiculitis     Osteoporosis     Displacement of lumbar intervertebral disc without myelopathy     Restless legs syndrome (RLS)     Disturbance in sleep behavior     Displacement of cervical intervertebral disc without myelopathy     Anxiety     CARDIOVASCULAR SCREENING; LDL GOAL LESS THAN 130     Tobacco user     Family history of breast cancer in mother     Vitamin D deficiency     Chronic cholecystitis     Right ureteral stone     Thyroid nodule; on outsie dc,due for f/u      H/O vertebroplasty     COPD (chronic obstructive pulmonary disease); spirometry 3/10 ; rare use of mdi     Alcoholism (H)     Graves disease     S/P cholecystectomy     Major depressive disorder, recurrent episode, moderate (H)     Advance Care Planning     Past Surgical History:   Procedure Laterality Date     FUSION CERVICAL ANTERIOR TWO LEVELS      C 5-7     FUSION LUMBAR ANTERIOR THREE+ LEVELS      L3- SI     HYSTERECTOMY, DEYSI      no oophorectomy     INJECT EPIDURAL TRANSFORAMINAL Bilateral 3/9/2017    Procedure: INJECT EPIDURAL TRANSFORAMINAL;  Surgeon: Pérez Valle MD;  Location:  OR     LAPAROSCOPIC CHOLECYSTECTOMY  2012    Procedure: LAPAROSCOPIC CHOLECYSTECTOMY;  Laparoscopic vs Open Cholecystectomy;  Surgeon: Esperanza Quinn MD;  Location: [facility]     LASER HOLMIUM LITHOTRIPSY URETER(S), INSERT STENT, COMBINED  2012    Procedure: COMBINED CYSTOSCOPY, URETEROSCOPY, LASER HOLMIUM LITHOTRIPSY URETER(S), INSERT STENT;  Cystoscopy, Right Ureteroscopy Holmium Laser Lithotripsy , right ureteral stent placement *Latex Safe*;  Surgeon: Joshua Rm MD;  Location: [facility]     SURGICAL HISTORY OF -        section      "SURGICAL HISTORY OF -       Tubal ligation     SURGICAL HISTORY OF -       Post hip fracture     SURGICAL HISTORY OF -       hardware removal, right iliac crest      VERTEBROPLASTY  2013       Social History   Substance Use Topics     Smoking status: Current Some Day Smoker     Packs/day: 0.50     Types: Cigarettes     Last attempt to quit: 2017     Smokeless tobacco: Never Used     Alcohol use No     Family History   Problem Relation Age of Onset     HEART DISEASE Brother      Breast Cancer Mother       age 31     Alcohol/Drug Other            Reviewed and updated as needed this visit by clinical staff  Tobacco  Allergies  Meds  Problems  Med Hx  Surg Hx  Fam Hx  Soc Hx        Reviewed and updated as needed this visit by Provider  Allergies  Meds  Problems         ROS:  Constitutional, HEENT, cardiovascular, pulmonary, gi and gu systems are negative, except as otherwise noted.    OBJECTIVE:     /72  Pulse 86  Temp 97.8  F (36.6  C) (Tympanic)  Resp 18  Ht 5' 1.25\" (1.556 m)  Wt 170 lb (77.1 kg)  SpO2 98%  Breastfeeding? No  BMI 31.86 kg/m2  Body mass index is 31.86 kg/(m^2).  GENERAL: healthy, alert and no distress  NECK: no adenopathy, no asymmetry, masses, or scars and thyroid normal to palpation  RESP: lungs clear to auscultation - no rales, rhonchi or wheezes  CV: regular rate and rhythm, normal S1 S2, no S3 or S4, no murmur, click or rub, no peripheral edema and peripheral pulses strong  MS: Moves slowly when sitting down and standing from chair  PSYCH: mentation appears normal, affect flat, judgement and insight intact and interacts appropriately during the visit, discouraged    Diagnostic Test Results:  Results for orders placed or performed in visit on 18   UA reflex to Microscopic and Culture   Result Value Ref Range    Color Urine Yellow     Appearance Urine Clear     Glucose Urine Negative NEG^Negative mg/dL    Bilirubin Urine Negative NEG^Negative    Ketones " Urine Negative NEG^Negative mg/dL    Specific Gravity Urine 1.015 1.003 - 1.035    Blood Urine Negative NEG^Negative    pH Urine 7.0 5.0 - 7.0 pH    Protein Albumin Urine Negative NEG^Negative mg/dL    Urobilinogen Urine 0.2 0.2 - 1.0 EU/dL    Nitrite Urine Negative NEG^Negative    Leukocyte Esterase Urine Negative NEG^Negative    Source Midstream Urine        ASSESSMENT/PLAN:     ASSESSMENT:  1. Encounter for smoking cessation counseling.  Patient seems motivated to quit smoking.  Will start Wellbutrin, materials provided regarding quit plan program, support and encouragement provided.   2. Tobacco abuse    3. Major depressive disorder, recurrent episode, moderate (H).  Patient quite discouraged regarding outcome from back surgery this past May.  Hopeful that the addition of bupropion for smoking cessation will also help with mood.   4. Incontinence in female.  Will check UA to rule out bladder infection.  If negative, will refer to physical therapy incontinence program.   5. Restless legs syndrome (RLS).        PLAN:  Orders Placed This Encounter     Tobacco Cessation - Order to Satisfy Health Maintenance     UA reflex to Microscopic and Culture     MED THERAPY MANAGE REFERRAL     QUITPLAN  Referral     buPROPion (WELLBUTRIN SR) 150 MG 12 hr tablet     pregabalin (LYRICA) 75 MG capsule       Patient Instructions   Start the Wellbutrin at least a week prior to your quit date.    Please call after your appointment tomorrow with an update.     HOW TO QUIT SMOKING  Smoking is one of the hardest habits to break. About half of all those who have ever smoked have been able to quit, and most of those (about 70%) who still smoke want to quit. Here are some of the best ways to stop smoking.     KEEP TRYING:  It takes most smokers about 8 tries before they are finally able to fully quit. So, the more often you try and fail, the better your chance of quitting the next time! So, don't give up!    GO COLD TURKEY:  Most  ex-smokers quit cold turkey. Trying to cut back gradually doesn't seem to work as well, perhaps because it continues the smoking habit. Also, it is possible to fool yourself by inhaling more while smoking fewer cigarettes. This results in the same amount of nicotine in your body!    GET SUPPORT:  Support programs can make an important difference, especially for the heavy smoker. These groups offer lectures, methods to change your behavior and peer support. Call the free national Quitline for more information. 800-QUIT-NOW (860-157-4028). Low-cost or free programs are offered by many hospitals, local chapters of the American Lung Association (229-970-3002) and the American Cancer Society (025-667-4657). Support at home is important too. Non-smokers can help by offering praise and encouragement. If the smoker fails to quit, encourage them to try again!    OVER-THE-COUNTER MEDICINES:  For those who can't quit on their own, Nicotine Replacement Therapy (NRT) may make quitting much easier. Certain aids such as the nicotine patch, gum and lozenge are available without a prescription. However, it is best to use these under the guidance of your doctor. The skin patch provides a steady supply of nicotine to the body. Nicotine gum and lozenge gives temporary bursts of low levels of nicotine. Both methods take the edge off the craving for cigarettes. WARNING: If you feel symptoms of nicotine overdose, such as nausea, vomiting, dizziness, weakness, or fast heartbeat, stop using these and see your doctor.    PRESCRIPTION MEDICINES:  After evaluating your smoking patterns and prior attempts at quitting, your doctor may offer a prescription medicine such as bupropion (Zyban, Wellbutrin), varenicline (Chantix, Champix), a niocotine inhaler or nasal spray. Each has its unique advantage and side effects which your doctor can review with you.    HEALTH BENEFITS OF QUITTING:  The benefits of quitting start right away and keep improving  the longer you go without smokin minutes: blood pressure and pulse return to normal  8 hours: oxygen levels return to normal  2 days: ability to smell and taste begins to improve as damaged nerves start to regrow  2-3 weeks: circulation and lung function improves  1-9 months: decreased cough, congestion and shortness of breath; less tired  1 year: risk of heart attack decreases by half  5 years: risk of lung cancer decreases by half; risk of stroke becomes the same as a non-smoker  For information about how to quit smoking, visit the following links:  National [employer] [place] ,   Clearing the Air, Quit Smoking Today   - an online booklet. http://www.smokefree.gov/pubs/clearing_the_air.pdf  Smokefree.gov http://smokefree.gov/  QuitNet http://www.quitnet.com/    0327-3540 [employer], [address] [place] [address]. All rights reserved. This information is not intended as a substitute for professional medical care. Always follow your healthcare professional's instructions.    The Benefits of Living Smoke Free  What do you want to gain from quitting? Check off some reasons to quit.  Health Benefits  ___ Reduce my risk of lung cancer, heart disease, chronic lung disease  ___ Have fewer wrinkles and softer skin  ___ Improve my sense of taste and smell  ___ For pregnant women--reduce the risk of having a miscarriage, stillbirth, premature birth, or low-birth-weight baby  Personal Benefits  ___ Feel more in control of my life  ___ Have better-smelling hair, breath, clothes, home, and car  ___ Save time by not having to take smoke breaks, buy cigarettes, or hunt for a light  ___ Have whiter teeth  Family Benefits  ___ Reduce my children s respiratory tract infections  ___ Set a good example for my children  ___ Reduce my family s cancer risk  Financial Benefits  ___ Save hundreds of dollars each year that would be spent on cigarettes  ___ Save money on medical bills  ___ Save on life, health, and car  insurance premiums    Those Dollars Add Up!  Cigarettes are expensive, and getting more expensive all the time. Do you realize how much money you are spending on cigarettes per year? What is the average amount you spend on a pack of cigarettes? What is the average number of packs that you smoke per day? Using your answers to these questions, fill in this formula to help you find out:  ($ _____ per pack) ×  ( _____ number of packs per day) × (365 days) =  $ _____ yearly cost of smoking  Besides tobacco, there are other costs, including extra cleaning bills and replacement costs for clothing and furniture; medical expenses for smoking-related illnesses; and higher health, life, and car insurance premiums.    Cigars and Pipes Count Too!  Cigars and pipes are also dangerous. So are smokeless (chewing) tobacco and snuff. All of these products contain nicotine, a highly addictive substance that has harmful effects on your body. Quitting smoking means giving up all tobacco products.      6914-7049 [facility], [address] [place] [address]. All rights reserved. This information is not intended as a substitute for professional medical care. Always follow your healthcare professional's instructions.  Bupropion sustained-release tablets (smoking cessation)  Brand Names: BupShirley willett  What is this medicine?  BUPROPION (byoo PROE pee on) is used to help people quit smoking.  How should I use this medicine?  Take this medicine by mouth with a glass of water. Follow the directions on the prescription label. You can take it with or without food. If it upsets your stomach, take it with food. Do not cut, crush or chew this medicine. Take your medicine at regular intervals. If you take this medicine more than once a day, take your second dose at least 8 hours after you take your first dose. To limit difficulty in sleeping, avoid taking this medicine at bedtime. Do not take your medicine more often than directed. Do  not stop taking this medicine suddenly except upon the advice of your doctor. Stopping this medicine too quickly may cause serious side effects.  A special MedGuide will be given to you by the pharmacist with each prescription and refill. Be sure to read this information carefully each time.  Talk to your pediatrician regarding the use of this medicine in children. Special care may be needed.  What side effects may I notice from receiving this medicine?  Side effects that you should report to your doctor or health care professional as soon as possible:    allergic reactions like skin rash, itching or hives, swelling of the face, lips, or tongue    breathing problems    changes in vision    confusion    elevated mood, decreased need for sleep, racing thoughts, impulsive behavior    fast or irregular heartbeat    hallucinations, loss of contact with reality    increased blood pressure    redness, blistering, peeling or loosening of the skin, including inside the mouth    seizures    suicidal thoughts or other mood changes    unusually weak or tired    vomiting  Side effects that usually do not require medical attention (report to your doctor or health care professional if they continue or are bothersome):    constipation    headache    loss of appetite    nausea    tremors    weight loss  What may interact with this medicine?  Do not take this medicine with any of the following medications:    linezolid    MAOIs like Azilect, Carbex, Eldepryl, Marplan, Nardil, and Parnate    methylene blue (injected into a vein)    other medicines that contain bupropion like Wellbutrin  This medicine may also interact with the following medications:    alcohol    certain medicines for anxiety or sleep    certain medicines for blood pressure like metoprolol, propranolol    certain medicines for depression or psychotic disturbances    certain medicines for HIV or AIDS like efavirenz, lopinavir, nelfinavir, ritonavir    certain medicines  for irregular heart beat like propafenone, flecainide    certain medicines for Parkinson's disease like amantadine, levodopa    certain medicines for seizures like carbamazepine, phenytoin, phenobarbital    cimetidine    clopidogrel    cyclophosphamide    digoxin    furazolidone    isoniazid    nicotine    orphenadrine    procarbazine    steroid medicines like prednisone or cortisone    stimulant medicines for attention disorders, weight loss, or to stay awake    tamoxifen    theophylline    thiotepa    ticlopidine    tramadol    warfarin  What if I miss a dose?  If you miss a dose, skip the missed dose and take your next tablet at the regular time. There should be at least 8 hours between doses. Do not take double or extra doses.  Where should I keep my medicine?  Keep out of the reach of children.  Store at room temperature between 20 and 25 degrees C (68 and 77 degrees F). Protect from light. Keep container tightly closed. Throw away any unused medicine after the expiration date.  What should I tell my health care provider before I take this medicine?  They need to know if you have any of these conditions:    an eating disorder, such as anorexia or bulimia    bipolar disorder or psychosis    diabetes or high blood sugar, treated with medication    glaucoma    head injury or brain tumor    heart disease, previous heart attack, or irregular heart beat    high blood pressure    kidney or liver disease    seizures    suicidal thoughts or a previous suicide attempt    Tourette's syndrome    weight loss    an unusual or allergic reaction to bupropion, other medicines, foods, dyes, or preservatives    breast-feeding    pregnant or trying to become pregnant  What should I watch for while using this medicine?  Visit your doctor or health care professional for regular checks on your progress. This medicine should be used together with a patient support program. It is important to participate in a behavioral program,  counseling, or other support program that is recommended by your health care professional.  Patients and their families should watch out for new or worsening thoughts of suicide or depression. Also watch out for sudden changes in feelings such as feeling anxious, agitated, panicky, irritable, hostile, aggressive, impulsive, severely restless, overly excited and hyperactive, or not being able to sleep. If this happens, especially at the beginning of treatment or after a change in dose, call your health care professional.  Avoid alcoholic drinks while taking this medicine. Drinking excessive alcoholic beverages, using sleeping or anxiety medicines, or quickly stopping the use of these agents while taking this medicine may increase your risk for a seizure.  Do not drive or use heavy machinery until you know how this medicine affects you. This medicine can impair your ability to perform these tasks.  Do not take this medicine close to bedtime. It may prevent you from sleeping.  Your mouth may get dry. Chewing sugarless gum or sucking hard candy, and drinking plenty of water may help. Contact your doctor if the problem does not go away or is severe.  Do not use nicotine patches or chewing gum without the advice of your doctor or health care professional while taking this medicine. You may need to have your blood pressure taken regularly if your doctor recommends that you use both nicotine and this medicine together.  NOTE:This sheet is a summary. It may not cover all possible information. If you have questions about this medicine, talk to your doctor, pharmacist, or health care provider. Copyright  2017 [employer]    Patient agrees with plan of care as outlined. Call or return to the clinic with any worsening of symptoms or no resolution. Patient/Parent verbalized understanding and is in agreement. Medication side effects reviewed.    Chart documentation with Dragon voice recognition software. Although reviewed after  completion, some words and grammatical errors may remain      Tammy Tyler, FLAVIA, APRN CNP  [facility]

## 2018-02-07 NOTE — NURSING NOTE
"Chief Complaint   Patient presents with     Smoking Cessation       Initial There were no vitals taken for this visit. Estimated body mass index is 31.25 kg/(m^2) as calculated from the following:    Height as of 3/9/17: 5' 1\" (1.549 m).    Weight as of 10/18/17: 165 lb 6.4 oz (75 kg).      Health Maintenance that is potentially due pending provider review:  Colonoscopy/FIT and PHQ9    Possibly completing today per provider review.    Is there anyone who you would like to be able to receive your results? No  If yes have patient fill out JASIEL    "

## 2018-02-08 ENCOUNTER — TRANSFERRED RECORDS (OUTPATIENT)
Dept: HEALTH INFORMATION MANAGEMENT | Facility: CLINIC | Age: 58
End: 2018-02-08

## 2018-02-08 ASSESSMENT — PATIENT HEALTH QUESTIONNAIRE - PHQ9: SUM OF ALL RESPONSES TO PHQ QUESTIONS 1-9: 5

## 2018-02-08 ASSESSMENT — ANXIETY QUESTIONNAIRES: GAD7 TOTAL SCORE: 14

## 2018-02-09 ENCOUNTER — TELEPHONE (OUTPATIENT)
Dept: FAMILY MEDICINE | Facility: CLINIC | Age: 58
End: 2018-02-09

## 2018-02-09 NOTE — TELEPHONE ENCOUNTER
Reason for call:  Patient reporting a symptom    Symptom or request: Incontinence. Discussed this at last OV with CLAIRE Tyler. Provider due to Back surgery. Pt said she brought it up with the Nurse. Provider did not bring it up.   Spine Surgeon is Cuco May. Pt said ADARSH Tyler was going to do further testing on her. Could CLAIRE Tyler place orders in for this test.    Duration (how long have symptoms been present): See last OV    Have you been treated for this before? Yes    Phone Number patient can be reached at:  Home number on file [phone] (home)    Best Time:  Any Time      Can we leave a detailed message on this number:  YES    Call taken on 2/9/2018 at 3:52 PM by Loretta Mccrary

## 2018-02-20 ENCOUNTER — ALLIED HEALTH/NURSE VISIT (OUTPATIENT)
Dept: PHARMACY | Facility: CLINIC | Age: 58
End: 2018-02-20
Payer: MEDICAID

## 2018-02-20 DIAGNOSIS — Z71.6 ENCOUNTER FOR SMOKING CESSATION COUNSELING: Primary | ICD-10-CM

## 2018-02-20 DIAGNOSIS — J42 CHRONIC BRONCHITIS, UNSPECIFIED CHRONIC BRONCHITIS TYPE (H): ICD-10-CM

## 2018-02-20 DIAGNOSIS — G25.81 RESTLESS LEGS SYNDROME (RLS): ICD-10-CM

## 2018-02-20 DIAGNOSIS — M81.0 OSTEOPOROSIS, UNSPECIFIED OSTEOPOROSIS TYPE, UNSPECIFIED PATHOLOGICAL FRACTURE PRESENCE: ICD-10-CM

## 2018-02-20 DIAGNOSIS — M51.26 DISPLACEMENT OF LUMBAR INTERVERTEBRAL DISC WITHOUT MYELOPATHY: ICD-10-CM

## 2018-02-20 PROCEDURE — 99605 MTMS BY PHARM NP 15 MIN: CPT | Performed by: PHARMACIST

## 2018-02-20 PROCEDURE — 99607 MTMS BY PHARM ADDL 15 MIN: CPT | Performed by: PHARMACIST

## 2018-02-20 RX ORDER — ERGOCALCIFEROL 1.25 MG/1
50000 CAPSULE, LIQUID FILLED ORAL WEEKLY
COMMUNITY
End: 2019-03-18

## 2018-02-20 NOTE — PROGRESS NOTES
SUBJECTIVE/OBJECTIVE:                           Vesta Rodriguez is a 57 year old female called for an initial visit for Medication Therapy Management. She was referred to me from Tammy Tyler.     Chief Complaint: Smoking cessation.  Personal Healthcare Goals: Quit date: 3/1/18    Allergies/ADRs: Reviewed in Epic  Tobacco: 0-1 pack per day - is interested in quittingTobacco Cessation Action Plan: Phone counseling: Place order for QuitPlan (Tobacco Cessation [facility] Referral 1887)  Alcohol: history of alcohol dependence  Activity: limited to activities of daily living    PMH: Reviewed in Epic    Medication Adherence/Access  The patient misses their medication 0 times per week.    Patient is responsible for his/her own medications.   Adherence/Compliance is described as good.  Medication barriers: no issues reported by patient.  The patient fills general medications at [facility].    Has the patient been offered to fill at [facility]? Yes    Smoking Cessation:  How much does she smoke: about 10 cigarettes a day, she was smoking 1 pack a day, since starting bupropion she has been able to cut down on number of cigarettes smoked in a day.   Triggers for smoking include:  None - patient couldn't think of any  Tried quitting in the past: Yes.  How many times:  2.  For how lon and 4 months.  What worked/didn t work in the past: Nicotine gum worked, but patient was starting to get an upset stomach towards the end of using it, Chantix did not work (agitation)  Is patient currently pregnant: No  History of seizures/bipolar disease: No  Occupation: Disabled     Started Wellbutrin 150 mg SR one week ago Saturday. Started out 150 mg daily x 3 days then 150 mg twice daily. Does not report any side effects or intolerances. Has noticed an improvement since starting. Does not smoke nearly as much since starting. Currently smoking 10 cpd. Previously smoking 1 ppd. 50% percent reduction in 1 week.      is her current quit date. Is  preparing for her quit day. Has removed [place] trays from her house. Does not take cigarettes when she travels. Does not describe triggers for smoking.    First cessation attempt was back in May when the pt was having back surgery. Pt was told by physician she had to quit smoking. Pt was able to quit for 4 months. Pt restarted smoking when she got frustrated with how her back was feeling and not healing correctly. In this attempt she used nicotine gum. Started out fine then it became harsh on her stomach.     Currently, patient describes her back is not doing well. Has to go in on March 14th to get bulging disc evaluated. Struggling with bordem as her back pain and cold weather is preventing her from doing things.    COPD: Current medications: Albuterol (Proair) 2 puffs every four to 6 hours as needed and Fluticasone+Salmeterol (Advair) DPI twice daily. Pt does not rinse their mouth after using steroid inhaler.  Pt is not currently using Advair as prescribed. States she is only using about once a week. Reports that Proair needs to be refilled. She is currently not using Proair.   Pt is not experiencing side effects.   Pt reports the following symptoms: none.  Pt does have an COPD Action Plan on file.   Has spirometry been completed: yes    RLS: Current medications include Mirapex 1.5 mg twice daily, often taking both tablets at night. Ferritin labs from 2015 were >50, ferritin levels from 2010 were <50. Patient is not taking an iron supplement.     Back pain: Patient is not currently taking any oral or topical pain medication. States that nothing helps. Has Lyrica - takes occasionally at bedtime if having trouble sleeping.     Osteoporosis: Vitamin D 50,000 units once weekly. Pt getting adequate amounts of calcium from her diet. Consumes one glass of milk a day and eats 2-3 servings of cheese. States her physician is considering starting her on Fosamax. She has history of kidney stones - so is not taking a calcium  supplement.     Current labs include:  BP Readings from Last 3 Encounters:   02/07/18 130/72   10/18/17 128/74   09/19/17 126/74     Today's Vitals: There were no vitals taken for this visit.  Lab Results   Component Value Date    A1C 5.8 03/12/2012   .  Lab Results   Component Value Date    CHOL 240 10/18/2017     Lab Results   Component Value Date    TRIG 175 10/18/2017     Lab Results   Component Value Date    HDL 58 10/18/2017     Lab Results   Component Value Date     10/18/2017       Liver Function Studies -   Recent Labs   Lab Test  05/08/17   1428   PROTTOTAL  7.5   ALBUMIN  3.6   BILITOTAL  0.3   ALKPHOS  81   AST  12   ALT  18       Lab Results   Component Value Date    UCRR 180 04/24/2013       Last Basic Metabolic Panel:  Lab Results   Component Value Date     05/08/2017      Lab Results   Component Value Date    POTASSIUM 3.8 05/08/2017     Lab Results   Component Value Date    CHLORIDE 104 05/08/2017     Lab Results   Component Value Date    BUN 12 05/08/2017     Lab Results   Component Value Date    CR 0.70 05/08/2017     GFR Estimate   Date Value Ref Range Status   05/08/2017 87 >60 mL/min/1.7m2 Final     Comment:     Non  GFR Calc   08/04/2016 90 >60 mL/min/1.7m2 Final     Comment:     Non  GFR Calc   03/07/2016 >90  Non  GFR Calc   >60 mL/min/1.7m2 Final     TSH   Date Value Ref Range Status   05/08/2017 0.85 0.40 - 4.00 mU/L Final   ]    Most Recent Immunizations   Administered Date(s) Administered     Influenza (IIV3) PF 11/02/2012     Influenza Vaccine IM 3yrs+ 4 Valent IIV4 09/19/2017     Pneumococcal (PCV 7) 08/13/2012     Pneumococcal 23 valent 08/13/2012     TDAP Vaccine (Adacel) 04/07/2009       ASSESSMENT:                             Current medications were reviewed today.     Medication Adherence: No issues identified    Smoking cessation: Improving - Patient is progressing along well. Stop date set for 3/1/18. Reviewed some  information for patient such as ways to coppe with cravings. Sent patient some information on smoking cessation.    COPD: Needs improvement - non adherence to Advair instructions addressed. Patients breathing currently is doing well, not needing Advair. I reviewed with her that she should be using albuterol as needed when short of breath, not Advair - which is her controller medication. I sent a new prescription to patient's pharmacy for albuterol. I reviewed with patient that if albuterol use increases that we should then look to adding additional medication for her COPD. My recommendation would be to use Spiriva. Reviewed with patient that when she uses Advair, she should rinse her mouth.     RLS: Stable, I put in a future ferritin lab to make sure ferritin is >50. Ferritin levels <50 could possibly contribute to restless legs.     Back pain: Needs improvement - Patient to have bulging disc evaluated on 3/14/18.    Osetoporosis: Stable, reviewed with patient that if high dose vitamin D is only being taken for a couple of months, she should start taking over the counter vitamin D3 before stopping vitamin D2 since vitamin D2 has a very short 1/2 life. She is getting the recommended daily amount of calcium (1200 mg) from diet - best source since taking calcium supplements can increase risk for kidney stones, but calcium from diet decreases risk for kidney stones.        PLAN:                            1. Keep up the good work with your smoking cessation.   2. A new prescription for Proair was sent to your pharmacy.  3. I sent patient some information on smoking cessation.   4. Reviewed with patient that she should be using albuterol when short of breath. If use of albuterol increases then we should look at adding Spiriva Respimat.   5. I put in a future lab order for a ferritin lab to make sure it is >50.    I spent 30 minutes with this patient today. A copy of the visit note was provided to the patient's primary  care provider.    Will follow up in 4 weeks.    The patient was mailed a summary of these recommendations as an after visit summary.     Mike Pereira, Pharm.D Student

## 2018-02-20 NOTE — MR AVS SNAPSHOT
After Visit Summary   2018    Vesta Rodriguez    MRN: [id number]           Patient Information     Date Of Birth          1960        Visit Information        Provider Department      2018 2:30 PM Gayle Connolly [facility]        Today's Diagnoses     Chronic bronchitis, unspecified chronic bronchitis type (H)    -  1    Restless legs syndrome (RLS)          Care Instructions    Vesta,    Today we discussed the followin. I sent a new prescription for Proair (albuterol) to your pharmacy. This is your rescue inhaler, if you are short of breath - this is the inhaler that you should use. If you find that you need to use your albuterol inhaler daily - then we should take a look at adding an additional inhaler as a controller medication (these are taken every day to prevent shortness of breath). Since you have COPD - if you are needing to use albuterol daily, I would recommend starting Spiriva Respimat daily instead of Advair - this is typically recommended for COPD. If you have questions - we can review this at your follow up visit, or call me.   2. I am enclosing the information on smoking cessation I reviewed with you at our visit.    3. I put in a future lab order for a ferritin lab to make sure it is >50. Ask to have this lab done when you next get labs. A ferritin lab <50 can possibly contribute to restless legs.     I will call you to follow up on  at 2:30 pm.    Gayle Connolly, PharmD  [phone] in clinic on Tuesday and Wednesday              Follow-ups after your visit        Your next 10 appointments already scheduled     Mar 20, 2018  2:30 PM CDT   TELEMEDICINE with Gayle Connolly [facility] ([facility])    [address] [place] [id number] [phone]           Note: this is not an onsite visit; there is no need to come to the facility.               Future tests that were ordered for you today     Open Future Orders        Priority Expected Expires Ordered    Ferritin Routine  2/21/2019 2/21/2018            Who to contact     If you have questions or need follow up information about today's clinic visit or your schedule please contact [facility] directly at [phone].  Normal or non-critical lab and imaging results will be communicated to you by MyChart, letter or phone within 4 business days after the clinic has received the results. If you do not hear from us within 7 days, please contact the clinic through [employer] or phone. If you have a critical or abnormal lab result, we will notify you by phone as soon as possible.  Submit refill requests through [employer] or call your pharmacy and they will forward the refill request to us. Please allow 3 business days for your refill to be completed.          Additional Information About Your Visit        MyChart Information     [facility] gives you secure access to your electronic health record. If you see a primary care provider, you can also send messages to your care team and make appointments. If you have questions, please call your primary care clinic.  If you do not have a primary care provider, please call [phone] and they will assist you.        Care EveryWhere ID     This is your Care EveryWhere ID. This could be used by other organizations to access your [place] medical records  [id number]         Blood Pressure from Last 3 Encounters:   02/07/18 130/72   10/18/17 128/74   09/19/17 126/74    Weight from Last 3 Encounters:   02/07/18 170 lb (77.1 kg)   10/18/17 165 lb 6.4 oz (75 kg)   09/19/17 166 lb (75.3 kg)                 Today's Medication Changes          These changes are accurate as of 2/20/18 11:59 PM.  If you have any questions, ask your nurse or doctor.               Start taking these medicines.        Dose/Directions    albuterol 108 (90 BASE) MCG/ACT Inhaler    Commonly known as:  PROAIR HFA/PROVENTIL HFA/VENTOLIN HFA   Used for:  Chronic bronchitis, unspecified chronic bronchitis type (H)        Dose:  2 puff   Inhale 2 puffs into the lungs every 6 hours as needed for shortness of breath / dyspnea or wheezing   Quantity:  1 Inhaler   Refills:  1            Where to get your medicines      These medications were sent to [id number] - [place], [place] - [address]  [address] [place] [address] [address]     Phone:  [phone]     albuterol 108 (90 BASE) MCG/ACT Inhaler                Primary Care Provider Office Phone # Fax #    JAVIER Montilla [facility] [phone]       [address] [place] [address] [place] [address]        Equal Access to Services     HUBERT ZAVALA : Hadii lore sharifo Sopanchito, waaxda luqadaha, qaybta kaalmada adeegyada, joshua cox . So [facility] [phone].    ATENCIÓN: Si habla español, tiene a moses disposición servicios gratuitos de asistencia lingüística. [facility] [phone].    We comply with applicable federal civil rights laws and [place] laws. We do not discriminate on the basis of race, color, national origin, age, disability, sex, sexual orientation, or gender identity.            Thank you!     Thank you for choosing [facility]  for your care. Our goal is always to provide you with excellent care. Hearing back from our patients is one way we can continue to improve our services. Please take a few minutes to complete the written survey that you may receive in the mail after your visit with us. Thank you!             Your Updated Medication List - Protect others around you: Learn how to safely use, store and throw away your medicines at www.disposemymeds.org.          This list is accurate as of 2/20/18 11:59 PM.  Always use your most recent med list.                   Brand Name Dispense Instructions for use Diagnosis    albuterol 108 (90 BASE) MCG/ACT Inhaler    PROAIR HFA/PROVENTIL  HFA/VENTOLIN HFA    1 Inhaler    Inhale 2 puffs into the lungs every 6 hours as needed for shortness of breath / dyspnea or wheezing    Chronic bronchitis, unspecified chronic bronchitis type (H)       buPROPion 150 MG 12 hr tablet    WELLBUTRIN SR    180 tablet    Take 1 tablet (150 mg) by mouth daily for 3 days then increase to 150 mg twice daily.    Encounter for smoking cessation counseling       fluticasone-salmeterol 100-50 MCG/DOSE diskus inhaler    ADVAIR    2 Inhaler    Inhale 1 puff into the lungs 2 times daily    Chronic obstructive pulmonary disease, unspecified COPD type (H)       methimazole 5 MG tablet    TAPAZOLE    90 tablet    Take 1 tablet (5 mg) by mouth daily    Graves' disease       order for DME      Equipment being ordered: CPAP        * order for DME     1 Device    Equipment being ordered: Aqua K pad and pump. Diagnosis of progressive back pain with history of L3- sacral fusion, disc extrusion with impingement at L2-3 and L1-2.    Intervertebral disc prolapse with impingement       * order for DME     1 Device    Shower Chair-patient weight 170#    Displacement of lumbar intervertebral disc without myelopathy       * order for DME     1 Device    GRAB BAR    Displacement of cervical intervertebral disc without myelopathy, Brachial neuritis or radiculitis, History of back surgery       * order for DME     1 Units    Blood pressure monitor    Benign essential hypertension       pramipexole 1.5 MG tablet    MIRAPEX    180 tablet    Take 1 tablet (1.5 mg) by mouth 2 times daily    Restless leg syndrome       pregabalin 75 MG capsule    LYRICA    90 capsule    Take 1 capsule (75 mg) by mouth 2 times daily    Displacement of lumbar intervertebral disc without myelopathy       vitamin D 93030 UNIT capsule    ERGOCALCIFEROL     Take 50,000 Units by mouth once a week        * Notice:  This list has 4 medication(s) that are the same as other medications prescribed for you. Read the directions  carefully, and ask your doctor or other care provider to review them with you.

## 2018-02-21 RX ORDER — ALBUTEROL SULFATE 90 UG/1
2 AEROSOL, METERED RESPIRATORY (INHALATION) EVERY 6 HOURS PRN
Qty: 1 INHALER | Refills: 1 | Status: SHIPPED | OUTPATIENT
Start: 2018-02-21 | End: 2019-03-01

## 2018-02-25 ENCOUNTER — TRANSFERRED RECORDS (OUTPATIENT)
Dept: HEALTH INFORMATION MANAGEMENT | Facility: CLINIC | Age: 58
End: 2018-02-25

## 2018-02-26 LAB
ABNORMAL: ABNORMAL
ABNORMAL: ABNORMAL
ABNORMAL: NORMAL

## 2018-03-09 ENCOUNTER — OFFICE VISIT (OUTPATIENT)
Dept: FAMILY MEDICINE | Facility: CLINIC | Age: 58
End: 2018-03-09
Payer: MEDICARE

## 2018-03-09 VITALS
TEMPERATURE: 97.7 F | DIASTOLIC BLOOD PRESSURE: 60 MMHG | SYSTOLIC BLOOD PRESSURE: 98 MMHG | HEIGHT: 61 IN | WEIGHT: 162.8 LBS | RESPIRATION RATE: 18 BRPM | BODY MASS INDEX: 30.73 KG/M2 | HEART RATE: 81 BPM | OXYGEN SATURATION: 97 %

## 2018-03-09 DIAGNOSIS — M47.817 LUMBOSACRAL SPONDYLOSIS WITHOUT MYELOPATHY: ICD-10-CM

## 2018-03-09 DIAGNOSIS — R00.2 PALPITATIONS: ICD-10-CM

## 2018-03-09 DIAGNOSIS — Z01.818 PREOP GENERAL PHYSICAL EXAM: Primary | ICD-10-CM

## 2018-03-09 DIAGNOSIS — K52.9 COLITIS: ICD-10-CM

## 2018-03-09 DIAGNOSIS — K51.50 LEFT SIDED COLITIS WITHOUT COMPLICATIONS (H): ICD-10-CM

## 2018-03-09 DIAGNOSIS — Z98.890 H/O VERTEBROPLASTY: ICD-10-CM

## 2018-03-09 LAB
ALBUMIN SERPL-MCNC: 2.8 G/DL (ref 3.4–5)
ALP SERPL-CCNC: 97 U/L (ref 40–150)
ALT SERPL W P-5'-P-CCNC: 28 U/L (ref 0–50)
ANION GAP SERPL CALCULATED.3IONS-SCNC: 5 MMOL/L (ref 3–14)
AST SERPL W P-5'-P-CCNC: 30 U/L (ref 0–45)
BASOPHILS # BLD AUTO: 0 10E9/L (ref 0–0.2)
BASOPHILS NFR BLD AUTO: 0.3 %
BILIRUB SERPL-MCNC: 0.2 MG/DL (ref 0.2–1.3)
BUN SERPL-MCNC: 10 MG/DL (ref 7–30)
CALCIUM SERPL-MCNC: 8.3 MG/DL (ref 8.5–10.1)
CHLORIDE SERPL-SCNC: 105 MMOL/L (ref 94–109)
CO2 SERPL-SCNC: 25 MMOL/L (ref 20–32)
CREAT SERPL-MCNC: 0.74 MG/DL (ref 0.52–1.04)
DIFFERENTIAL METHOD BLD: ABNORMAL
EOSINOPHIL # BLD AUTO: 0 10E9/L (ref 0–0.7)
EOSINOPHIL NFR BLD AUTO: 0 %
ERYTHROCYTE [DISTWIDTH] IN BLOOD BY AUTOMATED COUNT: 13.8 % (ref 10–15)
GFR SERPL CREATININE-BSD FRML MDRD: 81 ML/MIN/1.7M2
GLUCOSE SERPL-MCNC: 271 MG/DL (ref 70–99)
HCT VFR BLD AUTO: 35.5 % (ref 35–47)
HGB BLD-MCNC: 11.6 G/DL (ref 11.7–15.7)
INR PPP: 1.22 (ref 0.86–1.14)
LYMPHOCYTES # BLD AUTO: 1.8 10E9/L (ref 0.8–5.3)
LYMPHOCYTES NFR BLD AUTO: 29.3 %
MCH RBC QN AUTO: 29.5 PG (ref 26.5–33)
MCHC RBC AUTO-ENTMCNC: 32.7 G/DL (ref 31.5–36.5)
MCV RBC AUTO: 90 FL (ref 78–100)
MONOCYTES # BLD AUTO: 0.3 10E9/L (ref 0–1.3)
MONOCYTES NFR BLD AUTO: 5.6 %
NEUTROPHILS # BLD AUTO: 3.9 10E9/L (ref 1.6–8.3)
NEUTROPHILS NFR BLD AUTO: 64.8 %
PLATELET # BLD AUTO: 366 10E9/L (ref 150–450)
POTASSIUM SERPL-SCNC: 3.9 MMOL/L (ref 3.4–5.3)
PROT SERPL-MCNC: 6.6 G/DL (ref 6.8–8.8)
RBC # BLD AUTO: 3.93 10E12/L (ref 3.8–5.2)
SODIUM SERPL-SCNC: 135 MMOL/L (ref 133–144)
WBC # BLD AUTO: 6.1 10E9/L (ref 4–11)

## 2018-03-09 PROCEDURE — 85025 COMPLETE CBC W/AUTO DIFF WBC: CPT | Performed by: NURSE PRACTITIONER

## 2018-03-09 PROCEDURE — 80053 COMPREHEN METABOLIC PANEL: CPT | Performed by: NURSE PRACTITIONER

## 2018-03-09 PROCEDURE — 85610 PROTHROMBIN TIME: CPT | Performed by: NURSE PRACTITIONER

## 2018-03-09 PROCEDURE — 99214 OFFICE O/P EST MOD 30 MIN: CPT | Performed by: NURSE PRACTITIONER

## 2018-03-09 PROCEDURE — 36415 COLL VENOUS BLD VENIPUNCTURE: CPT | Performed by: NURSE PRACTITIONER

## 2018-03-09 PROCEDURE — 93000 ELECTROCARDIOGRAM COMPLETE: CPT | Performed by: NURSE PRACTITIONER

## 2018-03-09 ASSESSMENT — PAIN SCALES - GENERAL: PAINLEVEL: MILD PAIN (2)

## 2018-03-09 NOTE — PATIENT INSTRUCTIONS
Before Your Surgery      Call your surgeon if there is any change in your health. This includes signs of a cold or flu (such as a sore throat, runny nose, cough, rash or fever).    Do not smoke, drink alcohol or take over the counter medicine (unless your surgeon or primary care doctor tells you to) for the 24 hours before and after surgery.    If you take prescribed drugs: Follow your doctor s orders about which medicines to take and which to stop until after surgery.    Eating and drinking prior to surgery: follow the instructions from your surgeon    Take a shower or bath the night before surgery. Use the soap your surgeon gave you to gently clean your skin. If you do not have soap from your surgeon, use your regular soap. Do not shave or scrub the surgery site.  Wear clean pajamas and have clean sheets on your bed.     Schedule appointment with the Gastroenterologist. If difficult to get into [place], then call and we'll put in a referral for [place] [facility].

## 2018-03-09 NOTE — NURSING NOTE
"Chief Complaint   Patient presents with     Pre-Op Exam       Initial Breastfeeding? No Estimated body mass index is 31.86 kg/(m^2) as calculated from the following:    Height as of 2/7/18: 5' 1.25\" (1.556 m).    Weight as of 2/7/18: 170 lb (77.1 kg).      Health Maintenance that is potentially due pending provider review:  Colonoscopy/FIT    Possibly completing today per provider review.    Is there anyone who you would like to be able to receive your results? No  If yes have patient fill out JASIEL    "

## 2018-03-09 NOTE — MR AVS SNAPSHOT
After Visit Summary   3/9/2018    Vesta Rodriguez    MRN: [id number]           Patient Information     Date Of Birth          1960        Visit Information        Provider Department      3/9/2018 9:20 AM Tammy Tyler APRN [facility]        Today's Diagnoses     Preop general physical exam    -  1    Lumbosacral spondylosis without myelopathy        H/O vertebroplasty        Colitis        Left sided colitis without complications (H)         Palpitations          Care Instructions      Before Your Surgery      Call your surgeon if there is any change in your health. This includes signs of a cold or flu (such as a sore throat, runny nose, cough, rash or fever).    Do not smoke, drink alcohol or take over the counter medicine (unless your surgeon or primary care doctor tells you to) for the 24 hours before and after surgery.    If you take prescribed drugs: Follow your doctor s orders about which medicines to take and which to stop until after surgery.    Eating and drinking prior to surgery: follow the instructions from your surgeon    Take a shower or bath the night before surgery. Use the soap your surgeon gave you to gently clean your skin. If you do not have soap from your surgeon, use your regular soap. Do not shave or scrub the surgery site.  Wear clean pajamas and have clean sheets on your bed.     Schedule appointment with the Gastroenterologist. If difficult to get into [place], then call and we'll put in a referral for [facility].          Follow-ups after your visit        Additional Services     GASTROENTEROLOGY ADULT REF CONSULT ONLY       Preferred Location: [facility], [facility]: [phone] and [facility] [phone]      Please be aware that coverage of these services is subject to the terms and limitations of your health insurance plan.  Call member services at your health plan with any benefit or coverage questions.  Any  procedures must be performed at a [facility] OR coordinated by your clinic's referral office.    Please bring the following with you to your appointment:    (1) Any X-Rays, CTs or MRIs which have been performed.  Contact the facility where they were done to arrange for  prior to your scheduled appointment.    (2) List of current medications   (3) This referral request   (4) Any documents/labs given to you for this referral                  Your next 10 appointments already scheduled     Mar 20, 2018  2:30 PM CDT   TELEMEDICINE with Gayle Connolly [facility] ([facility])    [address] [id number] [phone]           Note: this is not an onsite visit; there is no need to come to the facility.              Who to contact     If you have questions or need follow up information about today's clinic visit or your schedule please contact [facility] directly at [phone].  Normal or non-critical lab and imaging results will be communicated to you by [employer], letter or phone within 4 business days after the clinic has received the results. If you do not hear from us within 7 days, please contact the clinic through [employer] or phone. If you have a critical or abnormal lab result, we will notify you by phone as soon as possible.  Submit refill requests through [employer] or call your pharmacy and they will forward the refill request to us. Please allow 3 business days for your refill to be completed.          Additional Information About Your Visit        [employer] Information     [employer] gives you secure access to your electronic health record. If you see a primary care provider, you can also send messages to your care team and make appointments. If you have questions, please call your primary care clinic.  If you do not have a primary care provider, please call [phone] and they will assist you.        Care  "EveryWhere ID     This is your Care EveryWhere ID. This could be used by other organizations to access your [place] medical records  [id number]        Your Vitals Were     Pulse Temperature Respirations Height Pulse Oximetry Breastfeeding?    81 97.7  F (36.5  C) (Tympanic) 18 5' 1.25\" (1.556 m) 97% No    BMI (Body Mass Index)                   30.51 kg/m2            Blood Pressure from Last 3 Encounters:   03/09/18 98/60   02/07/18 130/72   10/18/17 128/74    Weight from Last 3 Encounters:   03/09/18 162 lb 12.8 oz (73.8 kg)   02/07/18 170 lb (77.1 kg)   10/18/17 165 lb 6.4 oz (75 kg)              We Performed the Following     CBC with platelets differential     Colonoscopy - HIM Scan     Colonoscopy - HIM Scan     Colonoscopy - HIM Scan     Comprehensive metabolic panel (BMP + Alb, Alk Phos, ALT, AST, Total. Bili, TP)     EKG 12-lead complete w/read - Clinics     GASTROENTEROLOGY ADULT REF CONSULT ONLY     INR          Today's Medication Changes          These changes are accurate as of 3/9/18 11:05 AM.  If you have any questions, ask your nurse or doctor.               Stop taking these medicines if you haven't already. Please contact your care team if you have questions.     pregabalin 75 MG capsule   Commonly known as:  LYRICA   Stopped by:  Tammy Tyler APRN CNP                    Primary Care Provider Office Phone # Fax #    JAVIER Montilla [phone]       [address] [place] [id number]        Equal Access to Services     [facility] LUCAS : Hadii aad ku hadasho Soomaali, waaxda luqadaha, qaybta kaalmada adeegyada, waxdhruv francoise pacheco. So [facility] [phone].    ATENCIÓN: Si habla español, tiene a moses disposición servicios gratuitos de asistencia lingüística. Llame al [phone].    We comply with applicable federal civil rights laws and [place] laws. We do not discriminate on the basis of race, color, national origin, age, disability, sex, sexual " orientation, or gender identity.            Thank you!     Thank you for choosing [facility]  for your care. Our goal is always to provide you with excellent care. Hearing back from our patients is one way we can continue to improve our services. Please take a few minutes to complete the written survey that you may receive in the mail after your visit with us. Thank you!             Your Updated Medication List - Protect others around you: Learn how to safely use, store and throw away your medicines at www.disposemymeds.org.          This list is accurate as of 3/9/18 11:05 AM.  Always use your most recent med list.                   Brand Name Dispense Instructions for use Diagnosis    albuterol 108 (90 BASE) MCG/ACT Inhaler    PROAIR HFA/PROVENTIL HFA/VENTOLIN HFA    1 Inhaler    Inhale 2 puffs into the lungs every 6 hours as needed for shortness of breath / dyspnea or wheezing    Chronic bronchitis, unspecified chronic bronchitis type (H)       buPROPion 150 MG 12 hr tablet    WELLBUTRIN SR    180 tablet    Take 1 tablet (150 mg) by mouth daily for 3 days then increase to 150 mg twice daily.    Encounter for smoking cessation counseling       fluticasone-salmeterol 100-50 MCG/DOSE diskus inhaler    ADVAIR    2 Inhaler    Inhale 1 puff into the lungs 2 times daily    Chronic obstructive pulmonary disease, unspecified COPD type (H)       methimazole 5 MG tablet    TAPAZOLE    90 tablet    Take 1 tablet (5 mg) by mouth daily    Graves' disease       order for DME      Equipment being ordered: CPAP        * order for DME     1 Device    Equipment being ordered: Aqua K pad and pump. Diagnosis of progressive back pain with history of L3- sacral fusion, disc extrusion with impingement at L2-3 and L1-2.    Intervertebral disc prolapse with impingement       * order for DME     1 Device    Shower Chair-patient weight 170#    Displacement of lumbar intervertebral disc without myelopathy       * order for  DME     1 Device    GRAB BAR    Displacement of cervical intervertebral disc without myelopathy, Brachial neuritis or radiculitis, History of back surgery       * order for DME     1 Units    Blood pressure monitor    Benign essential hypertension       pramipexole 1.5 MG tablet    MIRAPEX    180 tablet    Take 1 tablet (1.5 mg) by mouth 2 times daily    Restless leg syndrome       vitamin D 67003 UNIT capsule    ERGOCALCIFEROL     Take 50,000 Units by mouth once a week        * Notice:  This list has 4 medication(s) that are the same as other medications prescribed for you. Read the directions carefully, and ask your doctor or other care provider to review them with you.

## 2018-03-09 NOTE — PROGRESS NOTES
[facility]  [address] [facility] [address]  [phone]  Dept: [phone]    PRE-OP EVALUATION:  Today's date: 3/9/2018    Vesta Rodriguez (: 1960) presents for pre-operative evaluation assessment as requested by Dr. Alonso.  She requires evaluation and anesthesia risk assessment prior to undergoing surgery/procedure for treatment of lumbar spondylosis (remove battery from bone stimulator) .    Proposed Surgery/ Procedure: battery removal from bone stimulator  Date of Surgery/ Procedure: 3/14/2018  Time of Surgery/ Procedure: [facility]  Hospital/Surgical Facility: [facility]  Fax number for surgical facility: Dr Alonso's fax is: [phone]  Primary Physician: Tammy Tyler  Type of Anesthesia Anticipated: to be determined    Patient has a Health Care Directive or Living Will:  YES     1. NO - Do you have a history of Rowan Tyler RNC, NP  eart attack, stroke, stent, bypass or surgery on an artery in the head, neck, heart or legs?  2. YES - Do you ever have any pain or discomfort in your chest? When recently in hospital, felt irregular rate, palpitations. Lasted a minute. Has happened more than once.  3. NO - Do you have a history of  Heart Failure?  4. YES - ARE YOUR TROUBLED BY SHORTNESS OF BREATH WHEN WALKING ON THE LEVEL, UP A SLIGHT HILL OR AT NIGHT? COPD  5. NO - Do you currently have a cold, bronchitis or other respiratory infection?  6. NO - Do you have a cough, shortness of breath or wheezing?  7. NO - Do you sometimes get pains in the calves of your legs when you walk?  8. NO - Do you or anyone in your family have previous history of blood clots?  9. NO - Do you or does anyone in your family have a serious bleeding problem such as prolonged bleeding following surgeries or cuts?  10. NO - Have you ever had problems with anemia or been told to take iron pills?  11. YES - HAVE YOU HAD ANY ABNORMAL BLOOD LOSS SUCH AS BLACK, TARRY OR BLOODY STOOLS, OR  ABNORMAL VAGINAL BLEEDING? Recent episode of Colitis. bloody diarrhea ws presenting complaint to the ER.  See below.  12. YES - HAVE YOU EVER HAD A BLOOD TRANSFUSION? No complications   13. NO - Have you or any of your relatives ever had problems with anesthesia?  14. YES - DO YOU HAVE SLEEP APNEA, EXCESSIVE SNORING OR DAYTIME DROWSINESS? Sleep apnea. does not use due to ill fitting mask.  15. NO - Do you have any prosthetic heart valves?  16. NO - Do you have prosthetic joints?  17. NO - Is there any chance that you may be pregnant?      HPI:     HPI related to upcoming procedure: taking battery out as is having an MRI in 2 weeks. Seen by Dr. Alonso assistant 2/8/18. XR sowed some abnormality above area of past surgery. So getting MRI. More painful above site of previous surgery. Can't be very active. smoking cessation, hs one cig every other day.     Infectious colitis with diarrhea.  Hospitalized from 220 5/18 through 3/3/18 in [place].  She presented with bloody diarrhea.  Stool cultures were negative and antibiotics were stopped in the hospital.  She continues on Flagyl.  Reports today that she continues to have multiple stools per day (see up to 6) but stool is no longer watery.  However, it is not formed stool as of yet.  She is afebrile.  White blood cell count today is 6.1.  Hemoglobin 11.6.    GRAVES DISEASE.  Managed with methimazole.  She has been followed by the [facility] endocrinology clinic.  Most recent TSH from 5/9/2017 was 0.85    COPD - Patient has a longstanding history of moderate-severe COPD . Patient has been doing well overall noting SOB and continues on medication regimen albuterol inhaler, Advair without adverse reactions or side effects.                                                                                           .                                                                                                                                                                                                                        .    MEDICAL HISTORY:     Patient Active Problem List    Diagnosis Date Noted     Advance Care Planning 03/08/2017     Priority: Medium     Advance Care Planning 7/5/2017: Recommend Code Status in chart and patient's Advance Care Planning documents be reviewed to ensure alignment with patient's current wishes. Added by Mary Kay Forman [facility] Liaison  Advance Care Planning 3/8/2017: ACP Review of Chart / Resources Provided:  Reviewed chart for advance care plan.  Vesta KAYCEE Rodriguez has been provided information and resources to begin or update their advance care plan.  Added by Vesta Bradley       Major depressive disorder, recurrent episode, moderate (H) 10/24/2016     Priority: Medium     S/P cholecystectomy 08/04/2016     Priority: Medium     Graves disease 01/25/2016     Priority: Medium     Alcoholism (H) 11/19/2014     Priority: Medium     Sober since 2014.         H/O vertebroplasty 08/12/2013     Priority: Medium     Thyroid nodule; on outsie dc,due for f/u 5/13 04/19/2013     Priority: Medium     ultrasound may , 2013  Due for followup          Right ureteral stone 10/05/2012     Priority: Medium     8/1612         Chronic cholecystitis 09/04/2012     Priority: Medium     Tobacco user 08/28/2011     Priority: Medium     Family history of breast cancer in mother 08/28/2011     Priority: Medium     Vitamin D deficiency 08/28/2011     Priority: Medium     CARDIOVASCULAR SCREENING; LDL GOAL LESS THAN 130 10/31/2010     Priority: Medium     COPD (chronic obstructive pulmonary disease); spirometry 3/10 ; rare use of mdi 03/15/2010     Priority: Medium     Anxiety 09/26/2008     Priority: Medium     Disturbance in sleep behavior 10/08/2007     Priority: Medium     Has failed mult meds, - lunesta, ambien, klonopin  etoh treatment 2013  Not candidate for klonopin      Problem list name updated by automated process. Provider to review        Displacement of cervical intervertebral disc without myelopathy 10/08/2007     Priority: Medium     Cervical fusion. [facility].     2004       Restless legs syndrome (RLS) 07/30/2007     Priority: Medium     On mirapex, unable to go off     0.5 mg twice a day , for rls        Displacement of lumbar intervertebral disc without myelopathy 02/20/2006     Priority: Medium     2 disc fusion        Brachial neuritis or radiculitis 07/06/2005     Priority: Medium     Problem list name updated by automated process. Provider to review       Osteoporosis 07/06/2005     Priority: Medium     Hip fracture after bone graft.     forteo 3/09 - until summer 2010  Problem list name updated by automated process. Provider to review        Past Medical History:   Diagnosis Date     Anemia      Anemia due to blood loss, acute 10/21/2012     Anxiety      Back pain      Chronic cholecystitis      Degeneration of cervical intervertebral disc     04     Degeneration of lumbar or lumbosacral intervertebral disc      Major depression in remission (H)      Osteoporosis      Right ureteral stone      RLS (restless legs syndrome)      Sleep disturbance      UTI (urinary tract infection)     RECENT- ON ANTIBIOTIC, STILL BACTERIA IN URINE     Vitamin D deficiency      Past Surgical History:   Procedure Laterality Date     FUSION CERVICAL ANTERIOR TWO LEVELS      C 5-7     FUSION LUMBAR ANTERIOR THREE+ LEVELS      L3- SI     HYSTERECTOMY, DEYSI  1993    no oophorectomy     INJECT EPIDURAL TRANSFORAMINAL Bilateral 3/9/2017    Procedure: INJECT EPIDURAL TRANSFORAMINAL;  Surgeon: Pérez Valle MD;  Location:  OR     LAPAROSCOPIC CHOLECYSTECTOMY  9/12/2012    Procedure: LAPAROSCOPIC CHOLECYSTECTOMY;  Laparoscopic vs Open Cholecystectomy;  Surgeon: Esperanza Quinn MD;  Location: [facility] OR     LASER HOLMIUM LITHOTRIPSY URETER(S), INSERT STENT, COMBINED  11/2/2012    Procedure: COMBINED CYSTOSCOPY, URETEROSCOPY, LASER HOLMIUM LITHOTRIPSY  URETER(S), INSERT STENT;  Cystoscopy, Right Ureteroscopy Holmium Laser Lithotripsy , right ureteral stent placement *Latex Safe*;  Surgeon: Joshua Rm MD;  Location: [facility] OR     SURGICAL HISTORY OF -        section     SURGICAL HISTORY OF -       Tubal ligation     SURGICAL HISTORY OF -       Post hip fracture     SURGICAL HISTORY OF -       hardware removal, right iliac crest      VERTEBROPLASTY       Current Outpatient Prescriptions   Medication Sig Dispense Refill     albuterol (PROAIR HFA/PROVENTIL HFA/VENTOLIN HFA) 108 (90 BASE) MCG/ACT Inhaler Inhale 2 puffs into the lungs every 6 hours as needed for shortness of breath / dyspnea or wheezing 1 Inhaler 1     vitamin D (ERGOCALCIFEROL) 46652 UNIT capsule Take 50,000 Units by mouth once a week       buPROPion (WELLBUTRIN SR) 150 MG 12 hr tablet Take 1 tablet (150 mg) by mouth daily for 3 days then increase to 150 mg twice daily. 180 tablet 3     pramipexole (MIRAPEX) 1.5 MG tablet Take 1 tablet (1.5 mg) by mouth 2 times daily 180 tablet 0     order for DME Blood pressure monitor 1 Units 0     order for DME GRAB BAR 1 Device 1     order for DME Shower Chair-patient weight 170# 1 Device 0     order for DME Equipment being ordered: Aqua K pad and pump. Diagnosis of progressive back pain with history of L3- sacral fusion, disc extrusion with impingement at L2-3 and L1-2. 1 Device 0     methimazole (TAPAZOLE) 5 MG tablet Take 1 tablet (5 mg) by mouth daily 90 tablet 3     fluticasone-salmeterol (ADVAIR) 100-50 MCG/DOSE diskus inhaler Inhale 1 puff into the lungs 2 times daily 2 Inhaler 10     order for DME Equipment being ordered: CPAP       OTC products: none    Allergies   Allergen Reactions     Ambien [Zolpidem Tartrate]      Mental status changes     Asa [Aspirin]      Accidental overdose.     Celexa [Citalopram Hydrobromide]      review with pt., i cant' find side effect in chart. 06 Sylvie Mederos MD       [facility]  "[Doxycycline Hyclate]      Gabapentin      Rectalurgency      Tape [Adhesive Tape]      Zoloft      Wt gain       Latex Allergy: NO    Social History   Substance Use Topics     Smoking status: Current Some Day Smoker     Packs/day: 0.50     Types: Cigarettes     Last attempt to quit: 5/30/2017     Smokeless tobacco: Never Used     Alcohol use No     History   Drug Use No       REVIEW OF SYSTEMS:   CONSTITUTIONAL: NEGATIVE for fever, chills, change in weight  INTEGUMENTARY/SKIN: NEGATIVE for worrisome rashes, moles or lesions  EYES: NEGATIVE for vision changes or irritation  ENT/MOUTH: NEGATIVE for ear, mouth and throat problems  RESP: NEGATIVE for significant cough or SOB  BREAST: NEGATIVE for masses, tenderness or discharge  CV: POSITIVE palpitations  GI: diarrhea, now very loose not watery  : NEGATIVE for frequency, dysuria, or hematuria  MUSCULOSKELETAL:POSITIVE  for back pain in lumbar sacral region  NEURO: NEGATIVE for weakness, dizziness or paresthesias  ENDOCRINE: NEGATIVE for temperature intolerance, skin/hair changes  HEME: NEGATIVE for bleeding problems  PSYCHIATRIC: NEGATIVE for changes in mood or affect    EXAM:   BP 98/60  Pulse 81  Temp 97.7  F (36.5  C) (Tympanic)  Resp 18  Ht 5' 1.25\" (1.556 m)  Wt 162 lb 12.8 oz (73.8 kg)  SpO2 97%  Breastfeeding? No  BMI 30.51 kg/m2    GENERAL APPEARANCE: healthy, alert and no distress     EYES: EOMI, PERRL     HENT: ear canals and TM's normal and nose and mouth without ulcers or lesions     NECK: no adenopathy, no asymmetry, masses, or scars and thyroid normal to palpation     RESP: lungs clear to auscultation - no rales, rhonchi or wheezes     CV: regular rates and rhythm, normal S1 S2, no S3 or S4 and no murmur, click or rub     ABDOMEN:  soft, nontender, no HSM or masses and bowel sounds normal     MS: extremities normal- no gross deformities noted and tender to palpation para Lumbar sacral spine     SKIN: no suspicious lesions or rashes     NEURO: " Normal strength and tone, sensory exam grossly normal, mentation intact and speech normal     PSYCH: mentation appears normal. and affect normal/bright     LYMPHATICS: No cervical adenopathy    DIAGNOSTICS:     3/9/2018  Due to patient report of intermittent palpitations EKG was done:  EKG: appears normal, NSR, normal axis, normal intervals, no acute ST/T changes c/w ischemia, no LVH by voltage criteria, unchanged from previous tracings    Hemoglobin 11.6  WBC 6.1  Platelet 366  INR 1.22  Sodium 135  Potassium 3.9  Creatinine 0.74        Recent Labs   Lab Test  10/18/17   1310  05/08/17   1530  05/08/17   1428  08/04/16   1222  03/07/16   0954   03/12/12   1105   HGB  12.2   --   13.0   --   13.5   < >   --    PLT   --    --   270   --   248   < >   --    INR   --   0.92   --    --    --    --    --    NA   --    --   138  141  140   < >   --    POTASSIUM   --    --   3.8  3.4  3.8   < >   --    CR   --    --   0.70  0.68  0.62   < >   --    A1C   --    --    --    --    --    --   5.8    < > = values in this interval not displayed.        IMPRESSION:   Reason for surgery/procedure: Removal of battery from stimulator that MRI can be done.  Diagnosis/reason for consult: Preop H&P    The proposed surgical procedure is considered LOW risk.    REVISED CARDIAC RISK INDEX  The patient has the following serious cardiovascular risks for perioperative complications such as (MI, PE, VFib and 3  AV Block):  No serious cardiac risks  INTERPRETATION: 0 risks: Class I (very low risk - 0.4% complication rate)    The patient has the following additional risks for perioperative complications:  No identified additional risks    As patient's white blood cell count is normal and her diarrheal stools are decreasing, I think it safe for her to have procedure done despite recent episode of colitis.      ICD-10-CM    1. Preop general physical exam Z01.818 CBC with platelets differential     Comprehensive metabolic panel (BMP + Alb, Alk  Phos, ALT, AST, Total. Bili, TP)     INR   2. Lumbosacral spondylosis without myelopathy M47.817    3. H/O vertebroplasty Z98.890    4. Colitis K52.9 GASTROENTEROLOGY ADULT REF CONSULT ONLY     Comprehensive metabolic panel (BMP + Alb, Alk Phos, ALT, AST, Total. Bili, TP)     INR   5. Left sided colitis without complications (H)  K51.50 INR   6. Palpitations R00.2 EKG 12-lead complete w/read - Clinics       RECOMMENDATIONS:         --Patient is to take all scheduled medications on the day of surgery EXCEPT for modifications listed below.    APPROVAL GIVEN to proceed with proposed procedure, without further diagnostic evaluation       Signed Electronically by: Tammy Tyler, NP, APRN CNP    Copy of this evaluation report is provided to requesting physician.    [facility] Preop Guidelines

## 2018-03-12 NOTE — PROGRESS NOTES
Pre op- EKG faxed to:  Pt is having Surgery at [facility] Fax [phone]  Dr. Alonso Del Valle spine Tele [phone]  Fax [phone]  [facility]

## 2018-03-20 ENCOUNTER — TELEPHONE (OUTPATIENT)
Dept: PHARMACY | Facility: CLINIC | Age: 58
End: 2018-03-20

## 2018-03-20 NOTE — TELEPHONE ENCOUNTER
Called patient to follow up. Left a message asking her to return my call.  Gayle Connolly, PharmD  Medication Therapy Management

## 2018-03-26 ENCOUNTER — TELEPHONE (OUTPATIENT)
Dept: FAMILY MEDICINE | Facility: CLINIC | Age: 58
End: 2018-03-26

## 2018-03-26 ENCOUNTER — MEDICAL CORRESPONDENCE (OUTPATIENT)
Dept: HEALTH INFORMATION MANAGEMENT | Facility: CLINIC | Age: 58
End: 2018-03-26

## 2018-03-26 NOTE — TELEPHONE ENCOUNTER
Reason for Call:  Form, our goal is to have forms completed with 72 hours, however, some forms may require a visit or additional information.    Type of letter, form or note:  Prescription - Lidopril  Pt is requesting this to see if it can help her Back pain.    Who is the form from?: Prescription Pharmacy (if other please explain)    Where did the form come from: form was faxed in    What clinic location was the form placed at?: [place]    Where the form was placed: 's Box      Call taken on 3/26/2018 at 2:53 PM by Loretta Mccrary

## 2018-03-27 NOTE — TELEPHONE ENCOUNTER
Form received back signed  3/27/18  Faxed Back & Sent to be scanned to this encounter  Loretta [facility]

## 2018-04-09 DIAGNOSIS — G25.81 RESTLESS LEG SYNDROME: ICD-10-CM

## 2018-04-09 RX ORDER — PRAMIPEXOLE DIHYDROCHLORIDE 1.5 MG/1
1.5 TABLET ORAL 2 TIMES DAILY
Qty: 180 TABLET | Refills: 0 | Status: SHIPPED | OUTPATIENT
Start: 2018-04-09 | End: 2018-05-09

## 2018-04-09 NOTE — TELEPHONE ENCOUNTER
Requested Prescriptions   Pending Prescriptions Disp Refills     pramipexole (MIRAPEX) 1.5 MG tablet 180 tablet 0     Sig: Take 1 tablet (1.5 mg) by mouth 2 times daily    There is no refill protocol information for this order        pramipexole (MIRAPEX) 1.5 MG tablet  Last Written Prescription Date:  01/05/2018  Last Fill Quantity: 180 tablet,  # refills: 0   Last office visit: 3/9/2018 with prescribing provider:  ADARSH Tyler   Future Office Visit:      Luisa TORRES (R) (M)

## 2018-04-09 NOTE — TELEPHONE ENCOUNTER
"Requested Prescriptions   Pending Prescriptions Disp Refills     pramipexole (MIRAPEX) 1.5 MG tablet 180 tablet 0     Sig: Take 1 tablet (1.5 mg) by mouth 2 times daily    Antiparkinson's Agents Protocol Passed    4/9/2018  2:39 PM       Passed - Blood pressure under 140/90 in past 12 months    BP Readings from Last 3 Encounters:   03/09/18 98/60   02/07/18 130/72   10/18/17 128/74                Passed - Recent (12 mo) or future (30 days) visit within the authorizing provider's specialty    Patient had office visit in the last 12 months or has a visit in the next 30 days with authorizing provider or within the authorizing provider's specialty.  See \"Patient Info\" tab in inbasket, or \"Choose Columns\" in Meds & Orders section of the refill encounter.           Passed - Patient is age 18 or older       Passed - No active pregnancy on record       Passed - No positive pregnancy test in the past 12 months        Luisa Cueva RT (R) (M)    "

## 2018-04-19 ENCOUNTER — TRANSFERRED RECORDS (OUTPATIENT)
Dept: HEALTH INFORMATION MANAGEMENT | Facility: CLINIC | Age: 58
End: 2018-04-19

## 2018-05-07 ENCOUNTER — TELEPHONE (OUTPATIENT)
Dept: SURGERY | Facility: CLINIC | Age: 58
End: 2018-05-07

## 2018-05-07 ENCOUNTER — TELEPHONE (OUTPATIENT)
Dept: FAMILY MEDICINE | Facility: CLINIC | Age: 58
End: 2018-05-07

## 2018-05-07 DIAGNOSIS — G25.81 RESTLESS LEG SYNDROME: ICD-10-CM

## 2018-05-07 RX ORDER — PRAMIPEXOLE DIHYDROCHLORIDE 1.5 MG/1
1.5 TABLET ORAL 2 TIMES DAILY
Qty: 180 TABLET | Refills: 0 | Status: CANCELLED | OUTPATIENT
Start: 2018-05-07

## 2018-05-07 NOTE — TELEPHONE ENCOUNTER
"Requested Prescriptions   Pending Prescriptions Disp Refills     pramipexole (MIRAPEX) 1.5 MG tablet 180 tablet 0     Sig: Take 1 tablet (1.5 mg) by mouth 2 times daily    Antiparkinson's Agents Protocol Passed    5/7/2018  2:29 PM       Passed - Blood pressure under 140/90 in past 12 months    BP Readings from Last 3 Encounters:   03/09/18 98/60   02/07/18 130/72   10/18/17 128/74                Passed - Recent (12 mo) or future (30 days) visit within the authorizing provider's specialty    Patient had office visit in the last 12 months or has a visit in the next 30 days with authorizing provider or within the authorizing provider's specialty.  See \"Patient Info\" tab in inbasket, or \"Choose Columns\" in Meds & Orders section of the refill encounter.           Passed - Patient is age 18 or older       Passed - No active pregnancy on record       Passed - No positive pregnancy test in the past 12 months        Luisa Cueva RT (R) (M)    "

## 2018-05-07 NOTE — TELEPHONE ENCOUNTER
Requested Prescriptions   Pending Prescriptions Disp Refills     pramipexole (MIRAPEX) 1.5 MG tablet 180 tablet 0     Sig: Take 1 tablet (1.5 mg) by mouth 2 times daily    There is no refill protocol information for this order        pramipexole (MIRAPEX) 1.5 MG tablet  Last Written Prescription Date:  04/09/2018  Last Fill Quantity: 180 tablet,  # refills: 0   Last office visit: 3/9/2018 with prescribing provider:  ADARSH Tyler   Future Office Visit:   Next 5 appointments (look out 90 days)     May 09, 2018 12:40 PM CDT   SHORT with Trinidad Drummond CNP   [facility] ([facility])    [address] [place] [address] [id number] [phone]                 Luisa TORRES (R) (M)

## 2018-05-07 NOTE — TELEPHONE ENCOUNTER
This RX continues to come for pt.  Pt did not request this- Denied. refaxed back to RX Form  Loretta [facility]

## 2018-05-07 NOTE — TELEPHONE ENCOUNTER
Patient called back to schedule TROY. Spoke with patient and she is scheduled on 6/14/2018 @ 1000 w/[place], arriving @ 900. Notified patient that she will need a  and H&P.

## 2018-05-08 PROBLEM — Z71.89 ADVANCED DIRECTIVES, COUNSELING/DISCUSSION: Chronic | Status: ACTIVE | Noted: 2017-03-08

## 2018-05-09 ENCOUNTER — OFFICE VISIT (OUTPATIENT)
Dept: FAMILY MEDICINE | Facility: CLINIC | Age: 58
End: 2018-05-09
Payer: MEDICARE

## 2018-05-09 VITALS
DIASTOLIC BLOOD PRESSURE: 64 MMHG | OXYGEN SATURATION: 97 % | TEMPERATURE: 97.6 F | BODY MASS INDEX: 30.4 KG/M2 | HEIGHT: 61 IN | SYSTOLIC BLOOD PRESSURE: 102 MMHG | WEIGHT: 161 LBS | HEART RATE: 86 BPM

## 2018-05-09 DIAGNOSIS — Z01.818 PREOP GENERAL PHYSICAL EXAM: Primary | ICD-10-CM

## 2018-05-09 DIAGNOSIS — Z71.6 ENCOUNTER FOR SMOKING CESSATION COUNSELING: ICD-10-CM

## 2018-05-09 DIAGNOSIS — E05.00 GRAVES DISEASE: ICD-10-CM

## 2018-05-09 DIAGNOSIS — Z72.0 TOBACCO USER: Chronic | ICD-10-CM

## 2018-05-09 DIAGNOSIS — G25.81 RESTLESS LEG SYNDROME: ICD-10-CM

## 2018-05-09 DIAGNOSIS — D64.9 HEMOGLOBIN LOW: ICD-10-CM

## 2018-05-09 DIAGNOSIS — G47.33 OSA (OBSTRUCTIVE SLEEP APNEA): Chronic | ICD-10-CM

## 2018-05-09 DIAGNOSIS — M51.26 DISPLACEMENT OF LUMBAR INTERVERTEBRAL DISC WITHOUT MYELOPATHY: ICD-10-CM

## 2018-05-09 PROBLEM — M43.16 SPONDYLOLISTHESIS OF LUMBAR REGION: Status: ACTIVE | Noted: 2018-05-09

## 2018-05-09 PROBLEM — G47.30 SLEEP APNEA: Chronic | Status: ACTIVE | Noted: 2018-05-09

## 2018-05-09 PROBLEM — G47.30 SLEEP APNEA: Status: ACTIVE | Noted: 2018-05-09

## 2018-05-09 PROBLEM — Z98.1 S/P LUMBAR FUSION: Status: ACTIVE | Noted: 2017-06-09

## 2018-05-09 PROBLEM — M48.061 LUMBAR STENOSIS: Status: ACTIVE | Noted: 2017-05-30

## 2018-05-09 PROBLEM — K52.9 COLITIS: Status: ACTIVE | Noted: 2018-02-26

## 2018-05-09 LAB — HGB BLD-MCNC: 12.1 G/DL (ref 11.7–15.7)

## 2018-05-09 PROCEDURE — 36415 COLL VENOUS BLD VENIPUNCTURE: CPT | Performed by: NURSE PRACTITIONER

## 2018-05-09 PROCEDURE — 84443 ASSAY THYROID STIM HORMONE: CPT | Performed by: NURSE PRACTITIONER

## 2018-05-09 PROCEDURE — 99214 OFFICE O/P EST MOD 30 MIN: CPT | Performed by: NURSE PRACTITIONER

## 2018-05-09 PROCEDURE — 85018 HEMOGLOBIN: CPT | Performed by: NURSE PRACTITIONER

## 2018-05-09 RX ORDER — ALENDRONATE SODIUM 70 MG/1
70 TABLET ORAL
COMMUNITY
End: 2020-03-20

## 2018-05-09 RX ORDER — HYDROXYZINE HYDROCHLORIDE 25 MG/1
25-50 TABLET, FILM COATED ORAL 3 TIMES DAILY PRN
COMMUNITY
End: 2024-01-23

## 2018-05-09 RX ORDER — PRAMIPEXOLE DIHYDROCHLORIDE 1.5 MG/1
1.5 TABLET ORAL 2 TIMES DAILY
Qty: 180 TABLET | Refills: 0 | Status: SHIPPED | OUTPATIENT
Start: 2018-05-09 | End: 2018-09-27

## 2018-05-09 RX ORDER — PREGABALIN 75 MG/1
75 CAPSULE ORAL 2 TIMES DAILY
COMMUNITY
End: 2019-03-18

## 2018-05-09 RX ORDER — BUPROPION HYDROCHLORIDE 150 MG/1
150 TABLET, EXTENDED RELEASE ORAL 2 TIMES DAILY
Qty: 180 TABLET | Refills: 3 | COMMUNITY
Start: 2018-05-09 | End: 2019-11-25

## 2018-05-09 NOTE — PATIENT INSTRUCTIONS
1. Preop general physical exam    2. Displacement of lumbar intervertebral disc without myelopathy  Chronic, stable  - Continue to follow with Dr. Alonso    3. Graves disease  Chronic, stable  - TSH with free T4 reflex  - Continue to follow with Endocrinology as needed    4. Tobacco user  Chronic, stable  - nicotine (EQ NICOTINE) 7 MG/24HR 24 hr patch; Place 1 patch onto the skin every 24 hours (May use 2 patches if needed per day)  Dispense: 30 patch; Refill: 6  - Use Nicorette gum whenever you'd like    5. CATALINA (obstructive sleep apnea)  Chronic, untreated  - SLEEP EVALUATION & MANAGEMENT REFERRAL - [facility] [phone] (Age 13 if over 100 lbs); Future    6. Hemoglobin low  Historical, needs recheck  - Hemoglobin    Before Your Surgery      Call your surgeon if there is any change in your health. This includes signs of a cold or flu (such as a sore throat, runny nose, cough, rash or fever).    Do not smoke, drink alcohol or take over the counter medicine (unless your surgeon or primary care doctor tells you to) for the 24 hours before and after surgery.    If you take prescribed drugs: Follow your doctor s orders about which medicines to take and which to stop until after surgery.    Eating and drinking prior to surgery: follow the instructions from your surgeon    Take a shower or bath the night before surgery. Use the soap your surgeon gave you to gently clean your skin. If you do not have soap from your surgeon, use your regular soap. Do not shave or scrub the surgery site.  Wear clean pajamas and have clean sheets on your bed.

## 2018-05-09 NOTE — TELEPHONE ENCOUNTER
"Requested Prescriptions   Pending Prescriptions Disp Refills     pramipexole (MIRAPEX) 1.5 MG tablet 180 tablet 0     Sig: Take 1 tablet (1.5 mg) by mouth 2 times daily    Antiparkinson's Agents Protocol Passed    5/9/2018 11:30 AM       Passed - Blood pressure under 140/90 in past 12 months    BP Readings from Last 3 Encounters:   03/09/18 98/60   02/07/18 130/72   10/18/17 128/74                Passed - Recent (12 mo) or future (30 days) visit within the authorizing provider's specialty    Patient had office visit in the last 12 months or has a visit in the next 30 days with authorizing provider or within the authorizing provider's specialty.  See \"Patient Info\" tab in inbasket, or \"Choose Columns\" in Meds & Orders section of the refill encounter.           Passed - Patient is age 18 or older       Passed - No active pregnancy on record       Passed - No positive pregnancy test in the past 12 months          "

## 2018-05-09 NOTE — NURSING NOTE
"Chief Complaint   Patient presents with     Establish Care     Pre-Op Exam       Initial /64 (BP Location: Right arm, Patient Position: Sitting, Cuff Size: Adult Large)  Pulse 86  Temp 97.6  F (36.4  C) (Tympanic)  Ht 5' 1.25\" (1.556 m)  Wt 161 lb (73 kg)  SpO2 97%  BMI 30.17 kg/m2 Estimated body mass index is 30.17 kg/(m^2) as calculated from the following:    Height as of this encounter: 5' 1.25\" (1.556 m).    Weight as of this encounter: 161 lb (73 kg).      Health Maintenance that is potentially due pending provider review:  NONE    n/a    Is there anyone who you would like to be able to receive your results? No  If yes have patient fill out JASIEL    "

## 2018-05-09 NOTE — PROGRESS NOTES
Dear Vesta,  Normal Hemoglobin. Pre-op is approved.   Please contact our clinic via phone or My Chart if you have any questions or concerns.  Thanks,  CHRISTEN Hernadez

## 2018-05-09 NOTE — MR AVS SNAPSHOT
After Visit Summary   5/9/2018    Vesta Rodriguez    MRN: [id number]           Patient Information     Date Of Birth          1960        Visit Information        Provider Department      5/9/2018 12:40 PM Trinidad Drummond, CNP [facility]        Today's Diagnoses     Preop general physical exam    -  1    Displacement of lumbar intervertebral disc without myelopathy        Graves disease        Tobacco user        CATALINA (obstructive sleep apnea)        Encounter for smoking cessation counseling        Hemoglobin low          Care Instructions    1. Preop general physical exam    2. Displacement of lumbar intervertebral disc without myelopathy  Chronic, stable  - Continue to follow with Dr. Alonso    3. Graves disease  Chronic, stable  - TSH with free T4 reflex  - Continue to follow with Endocrinology as needed    4. Tobacco user  Chronic, stable  - nicotine (EQ NICOTINE) 7 MG/24HR 24 hr patch; Place 1 patch onto the skin every 24 hours (May use 2 patches if needed per day)  Dispense: 30 patch; Refill: 6  - Use Nicorette gum whenever you'd like    5. CATALINA (obstructive sleep apnea)  Chronic, untreated  - SLEEP EVALUATION & MANAGEMENT REFERRAL - ADULT -[facility] [phone] (Age 13 if over 100 lbs); Future    6. Hemoglobin low  Historical, needs recheck  - Hemoglobin    Before Your Surgery      Call your surgeon if there is any change in your health. This includes signs of a cold or flu (such as a sore throat, runny nose, cough, rash or fever).    Do not smoke, drink alcohol or take over the counter medicine (unless your surgeon or primary care doctor tells you to) for the 24 hours before and after surgery.    If you take prescribed drugs: Follow your doctor s orders about which medicines to take and which to stop until after surgery.    Eating and drinking prior to surgery: follow the instructions from your surgeon    Take a shower or bath the night  before surgery. Use the soap your surgeon gave you to gently clean your skin. If you do not have soap from your surgeon, use your regular soap. Do not shave or scrub the surgery site.  Wear clean pajamas and have clean sheets on your bed.           Follow-ups after your visit        Additional Services     SLEEP EVALUATION & MANAGEMENT REFERRAL - [facility] [phone] (Age 13 if over 100 lbs)       Please be aware that coverage of these services is subject to the terms and limitations of your health insurance plan.  Call member services at your health plan with any benefit or coverage questions.      Please bring the following to your appointment:    Seen in the past by Dr. Barahona in [facility]  >>   List of current medications   >>   This referral request   >>   Any documents/labs given to you for this referral                  Your next 10 appointments already scheduled     May 24, 2018   Procedure with Pérez Valle MD   [facility] ([facility])    [address] [place] [address] [phone]           From [address]: Exit at [address] on south side of [place]. Turn right on [address]. Turn left at stoplight on [address]. [facility] will be in view two blocks ahead              Future tests that were ordered for you today     Open Future Orders        Priority Expected Expires Ordered    SLEEP EVALUATION & MANAGEMENT REFERRAL - [facility] [phone] (Age 13 if over 100 lbs) Routine  5/9/2019 5/9/2018            Who to contact     If you have questions or need follow up information about today's clinic visit or your schedule please contact [facility] directly at [phone].  Normal or non-critical lab and imaging results will be communicated to you by MyChart, letter or phone within 4 business days after the clinic has received the results. If you do not  "hear from us within 7 days, please contact the clinic through [employer] or phone. If you have a critical or abnormal lab result, we will notify you by phone as soon as possible.  Submit refill requests through [employer] or call your pharmacy and they will forward the refill request to us. Please allow 3 business days for your refill to be completed.          Additional Information About Your Visit        [employer] Information     [employer] gives you secure access to your electronic health record. If you see a primary care provider, you can also send messages to your care team and make appointments. If you have questions, please call your primary care clinic.  If you do not have a primary care provider, please call [phone] and they will assist you.        Care EveryWhere ID     This is your Care EveryWhere ID. This could be used by other organizations to access your [facility] medical records  [id number]        Your Vitals Were     Pulse Temperature Height Pulse Oximetry BMI (Body Mass Index)       86 97.6  F (36.4  C) (Tympanic) 5' 1.25\" (1.556 m) 97% 30.17 kg/m2        Blood Pressure from Last 3 Encounters:   05/09/18 102/64   03/09/18 98/60   02/07/18 130/72    Weight from Last 3 Encounters:   05/09/18 161 lb (73 kg)   03/09/18 162 lb 12.8 oz (73.8 kg)   02/07/18 170 lb (77.1 kg)              We Performed the Following     Hemoglobin     TSH with free T4 reflex          Today's Medication Changes          These changes are accurate as of 5/9/18  1:39 PM.  If you have any questions, ask your nurse or doctor.               Start taking these medicines.        Dose/Directions    nicotine 7 MG/24HR 24 hr patch   Commonly known as:  EQ NICOTINE   Used for:  Tobacco user   Started by:  Trinidad Drummond, CNP        Dose:  1 patch   Place 1 patch onto the skin every 24 hours (May use 2 patches if needed per day)   Quantity:  30 patch   Refills:  6         These medicines have changed or have updated prescriptions.     "    Dose/Directions    buPROPion 150 MG 12 hr tablet   Commonly known as:  WELLBUTRIN SR   This may have changed:    - when to take this  - additional instructions   Used for:  Encounter for smoking cessation counseling   Changed by:  Trinidad Drummond CNP        Dose:  150 mg   Take 1 tablet (150 mg) by mouth 2 times daily   Quantity:  180 tablet   Refills:  3            Where to get your medicines      These medications were sent to [id number] - [place], [place] - [address]  [address] [place] [address]     Phone:  [phone]     nicotine 7 MG/24HR 24 hr patch                Primary Care Provider Office Phone # Fax #    Trinidad Drummond [phone]       [address] [facility] [facility] [address]        Equal Access to Services     LURDES ZAVALA : Jerica sharifo Soomaali, waaxda luqadaha, qaybta kaalmada adeegyada, joshua cox . So [facility] [phone].    ATENCIÓN: Si habla español, tiene a moses disposición servicios gratuitos de asistencia lingüística. [facility] [phone].    We comply with applicable federal civil rights laws and [place] laws. We do not discriminate on the basis of race, color, national origin, age, disability, sex, sexual orientation, or gender identity.            Thank you!     Thank you for choosing [facility]  for your care. Our goal is always to provide you with excellent care. Hearing back from our patients is one way we can continue to improve our services. Please take a few minutes to complete the written survey that you may receive in the mail after your visit with us. Thank you!             Your Updated Medication List - Protect others around you: Learn how to safely use, store and throw away your medicines at www.disposemymeds.org.          This list is accurate as of 5/9/18  1:39 PM.  Always use your most recent med list.                   Brand Name Dispense Instructions for use Diagnosis     albuterol 108 (90 Base) MCG/ACT Inhaler    PROAIR HFA/PROVENTIL HFA/VENTOLIN HFA    1 Inhaler    Inhale 2 puffs into the lungs every 6 hours as needed for shortness of breath / dyspnea or wheezing    Chronic bronchitis, unspecified chronic bronchitis type (H)       buPROPion 150 MG 12 hr tablet    WELLBUTRIN SR    180 tablet    Take 1 tablet (150 mg) by mouth 2 times daily    Encounter for smoking cessation counseling       fluticasone-salmeterol 100-50 MCG/DOSE diskus inhaler    ADVAIR    2 Inhaler    Inhale 1 puff into the lungs 2 times daily    Chronic obstructive pulmonary disease, unspecified COPD type (H)       methimazole 5 MG tablet    TAPAZOLE    90 tablet    Take 1 tablet (5 mg) by mouth daily    Graves' disease       nicotine 7 MG/24HR 24 hr patch    EQ NICOTINE    30 patch    Place 1 patch onto the skin every 24 hours (May use 2 patches if needed per day)    Tobacco user       order for DME      Equipment being ordered: CPAP        pramipexole 1.5 MG tablet    MIRAPEX    180 tablet    Take 1 tablet (1.5 mg) by mouth 2 times daily    Restless leg syndrome       vitamin D 71933 UNIT capsule    ERGOCALCIFEROL     Take 50,000 Units by mouth once a week

## 2018-05-09 NOTE — TELEPHONE ENCOUNTER
Requested Prescriptions   Pending Prescriptions Disp Refills     pramipexole (MIRAPEX) 1.5 MG tablet 180 tablet 0     Sig: Take 1 tablet (1.5 mg) by mouth 2 times daily    There is no refill protocol information for this order        pramipexole (MIRAPEX) 1.5 MG tablet  Last Written Prescription Date:  04/09/2018  Last Fill Quantity: 180 tablet,  # refills: 0   Last office visit: 3/9/2018 with prescribing provider:  ADARSH Tyler   Future Office Visit:   Next 5 appointments (look out 90 days)     May 09, 2018 12:40 PM CDT   SHORT with Trinidad Drummond CNP   [facility]    [address] [place] [address] [id number] [phone]

## 2018-05-09 NOTE — PROGRESS NOTES
[facility]  [address] [address]  [phone]  Dept: [phone]    PRE-OP EVALUATION:  Today's date: 2018    Vesta Rodriguez (: 1960) presents for pre-operative evaluation assessment as requested by Pérez Mobley MD.  She requires evaluation and anesthesia risk assessment prior to undergoing surgery/procedure for treatment of lumbar spondylosis .    Proposed Surgery/ Procedure: transforaminal epidural steroid injection, thoracic 12 - lumbar 1  Date of Surgery/ Procedure: 2018  Time of Surgery/ Procedure: 9:30 AM   Hospital/Surgical Facility: [facility]   Primary Physician: Trinidad Drummond  Type of Anesthesia Anticipated: MAC    Patient has a Health Care Directive or Living Will:  YES     1. NO - Do you have a history of heart attack, stroke, stent, bypass or surgery on an artery in the head, neck, heart or legs?  2. NO - Do you ever have any pain or discomfort in your chest?  3. NO - Do you have a history of  Heart Failure?  4. NO - Are you troubled by shortness of breath when: walking on the level, up a slight hill or at night?  5. NO - Do you currently have a cold, bronchitis or other respiratory infection?  6. NO - Do you have a cough, shortness of breath or wheezing?  7. NO - Do you sometimes get pains in the calves of your legs when you walk?  8. NO - Do you or anyone in your family have previous history of blood clots?  9. NO - Do you or does anyone in your family have a serious bleeding problem such as prolonged bleeding following surgeries or cuts?  10. NO - Have you ever had problems with anemia or been told to take iron pills?  11. YES - HAVE YOU HAD ANY ABNORMAL BLOOD LOSS SUCH AS BLACK, TARRY OR BLOODY STOOLS, OR ABNORMAL VAGINAL BLEEDING? 2018 GI bleed colitis ([place]) , 2012 GI bleed   12. YES - Have you ever had a blood transfusion?  Low hemoglobin unknown cause   13. NO - Have you or any of your  relatives ever had problems with anesthesia?   14. YES - DO YOU HAVE SLEEP APNEA, EXCESSIVE SNORING OR DAYTIME DROWSINESS? Sleep apnea, she doesn't use CPAP  15. NO - Do you have any prosthetic heart valves?  16. NO - Do you have prosthetic joints?  17. NO - Is there any chance that you may be pregnant?      HPI:     HPI related to upcoming procedure: epidural injection. She has a long-standing history of chronic low back pain with sciatica. She has also had neck pain. She has had L5- S1 fusion and L1 Kyphoplasty. She has had C5-7 anterior fusion. She has had L2-3 trans foraminal epidural steroid injection 3/9/2017. She follows with Dr. Alonso, orthopedics.     INFECTIOUS COLITIS on 2/26/2018. No longer having symptoms.    COPD - Patient has a longstanding history of mild COPD . Patient has been doing well overall noting NO SYMPTOMS and continues on medication regimen consisting of Advair and Albuterol inhaler without adverse reactions or side effects.                                                                                                           GRAVES DISEASE- Patient has a longstanding history of chronic Graves disease managed by Methimazole. She is always cold. She follows with [facility]. Repeat TSH drawn today- result pending.  Lab Results   Component Value Date    TSH 0.85 05/08/2017                                                                                                                                                             SLEEP PROBLEM - Patient has a longstanding history of snoring and periodic leg movement.. Patient has tried OTC medications with limited success. Dr. Barahona in [place] .                                   TOBACCO USE- 5-10 cigarettes daily. She quit 5,2017 when she was hospitalized and remained smoke free for 5 months. She then became frustrated with her back pain and re-started. She used Nicotine patch and Nicorette gum. She is aware how  important cessation is for MS issues and COPD.                                                                                                        MEDICAL HISTORY:     Patient Active Problem List    Diagnosis Date Noted     CATALINA (obstructive sleep apnea) 05/09/2018     Priority: High     Sleep apnea 05/09/2018     Priority: High     Overview:   does not use CPAP due to insomnia       Major depressive disorder, recurrent episode, moderate (H) 10/24/2016     Priority: High     Tobacco user 08/28/2011     Priority: High     COPD (chronic obstructive pulmonary disease); spirometry 3/10 ; rare use of mdi 03/15/2010     Priority: High     Anxiety 09/26/2008     Priority: High     Osteoporosis 07/06/2005     Priority: High     Hip fracture after bone graft.     forteo 3/09 - until summer 2010  Problem list name updated by automated process. Provider to review       Spondylolisthesis of lumbar region 05/09/2018     Priority: Medium     Colitis 02/26/2018     Priority: Medium     S/P lumbar fusion 06/09/2017     Priority: Medium     Lumbar stenosis 05/30/2017     Priority: Medium     S/P cholecystectomy 08/04/2016     Priority: Medium     Graves disease 01/25/2016     Priority: Medium     Alcoholism (H) 11/19/2014     Priority: Medium     Sober since 2014.         Thyroid nodule; on outsie dc,due for f/u 5/13 04/19/2013     Priority: Medium     ultrasound may , 2013  Due for followup          Compression fracture 01/16/2013     Priority: Medium     GERD (gastroesophageal reflux disease) 01/16/2013     Priority: Medium     Chronic cholecystitis 09/04/2012     Priority: Medium     Vitamin D deficiency 08/28/2011     Priority: Medium     Disturbance in sleep behavior 10/08/2007     Priority: Medium     Has failed mult meds, - lunesta, ambien, klonopin  etoh treatment 2013  Not candidate for klonopin      Problem list name updated by automated process. Provider to review       Displacement of cervical intervertebral disc  without myelopathy 10/08/2007     Priority: Medium     Cervical fusion. [place] [facility].     2004       Restless legs syndrome (RLS) 07/30/2007     Priority: Medium     On mirapex, unable to go off     0.5 mg twice a day , for rls        Displacement of lumbar intervertebral disc without myelopathy 02/20/2006     Priority: Medium     2 disc fusion        Brachial neuritis or radiculitis 07/06/2005     Priority: Medium     Problem list name updated by automated process. Provider to review       Advance Care Planning 03/08/2017     Priority: Low     Advance Care Planning 7/5/2017: Recommend Code Status in chart and patient's Advance Care Planning documents be reviewed to ensure alignment with patient's current wishes. Added by Mary Kay Forman [facility] Advance Care Planning Liaison  Advance Care Planning 3/8/2017: ACP Review of Chart / Resources Provided:  Reviewed chart for advance care plan.  Vesta Rodriguez has been provided information and resources to begin or update their advance care plan.  Added by Vesta Bradley       H/O vertebroplasty 08/12/2013     Priority: Low     H/O of right ureteral stone 10/05/2012     Priority: Low     8/1612         H/O: hysterectomy 08/19/2012     Priority: Low     Family history of breast cancer in mother 08/28/2011     Priority: Low     CARDIOVASCULAR SCREENING; LDL GOAL LESS THAN 130 10/31/2010     Priority: Low      Past Medical History:   Diagnosis Date     Anemia      Anemia due to blood loss, acute 10/21/2012     Anxiety      Back pain      Chronic cholecystitis      Degeneration of cervical intervertebral disc     04     Degeneration of lumbar or lumbosacral intervertebral disc      Major depression in remission (H)      Osteoporosis      Right ureteral stone      RLS (restless legs syndrome)      Sleep disturbance      UTI (urinary tract infection)     RECENT- ON ANTIBIOTIC, STILL BACTERIA IN URINE     Vitamin D deficiency      Past Surgical History:   Procedure Laterality Date      FUSION CERVICAL ANTERIOR TWO LEVELS      C 5-7     FUSION LUMBAR ANTERIOR THREE+ LEVELS      L3- SI     HYSTERECTOMY, DEYSI      no oophorectomy     INJECT EPIDURAL TRANSFORAMINAL Bilateral 3/9/2017    Procedure: INJECT EPIDURAL TRANSFORAMINAL;  Surgeon: Pérez Valle MD;  Location: [facility] OR     LAPAROSCOPIC CHOLECYSTECTOMY  2012    Procedure: LAPAROSCOPIC CHOLECYSTECTOMY;  Laparoscopic vs Open Cholecystectomy;  Surgeon: Esperanza Quinn MD;  Location: [facility]     LASER HOLMIUM LITHOTRIPSY URETER(S), INSERT STENT, COMBINED  2012    Procedure: COMBINED CYSTOSCOPY, URETEROSCOPY, LASER HOLMIUM LITHOTRIPSY URETER(S), INSERT STENT;  Cystoscopy, Right Ureteroscopy Holmium Laser Lithotripsy , right ureteral stent placement *Latex Safe*;  Surgeon: Joshua Rm MD;  Location: [facility] OR     SURGICAL HISTORY OF -        section     SURGICAL HISTORY OF -       Tubal ligation     SURGICAL HISTORY OF -       Post hip fracture     SURGICAL HISTORY OF -       hardware removal, right iliac crest      VERTEBROPLASTY       Current Outpatient Prescriptions   Medication Sig Dispense Refill     albuterol (PROAIR HFA/PROVENTIL HFA/VENTOLIN HFA) 108 (90 BASE) MCG/ACT Inhaler Inhale 2 puffs into the lungs every 6 hours as needed for shortness of breath / dyspnea or wheezing 1 Inhaler 1     alendronate (FOSAMAX) 70 MG tablet Take 70 mg by mouth every 7 days       buPROPion (WELLBUTRIN SR) 150 MG 12 hr tablet Take 1 tablet (150 mg) by mouth 2 times daily 180 tablet 3     fluticasone-salmeterol (ADVAIR) 100-50 MCG/DOSE diskus inhaler Inhale 1 puff into the lungs 2 times daily 2 Inhaler 10     hydrOXYzine (ATARAX) 25 MG tablet Take 25-50 mg by mouth 3 times daily as needed for itching       methimazole (TAPAZOLE) 5 MG tablet Take 1 tablet (5 mg) by mouth daily 90 tablet 3     nicotine (EQ NICOTINE) 7 MG/24HR 24 hr patch Place 1 patch onto the skin every 24 hours (May use 2 patches if needed per  day) 30 patch 6     order for DME Equipment being ordered: CPAP       pregabalin (LYRICA) 75 MG capsule Take 75 mg by mouth 2 times daily       vitamin D (ERGOCALCIFEROL) 06830 UNIT capsule Take 50,000 Units by mouth once a week       [DISCONTINUED] pramipexole (MIRAPEX) 1.5 MG tablet Take 1 tablet (1.5 mg) by mouth 2 times daily 180 tablet 0     pramipexole (MIRAPEX) 1.5 MG tablet Take 1 tablet (1.5 mg) by mouth 2 times daily 180 tablet 0     [DISCONTINUED] buPROPion (WELLBUTRIN SR) 150 MG 12 hr tablet Take 1 tablet (150 mg) by mouth daily for 3 days then increase to 150 mg twice daily. 180 tablet 3     OTC products: None, except as noted above    Allergies   Allergen Reactions     Ambien [Zolpidem Tartrate]      Mental status changes     Asa [Aspirin]      Accidental overdose.     Celexa [Citalopram Hydrobromide]      review with pt., i cant' find side effect in chart. 2/9/06 Sylvie Mederos MD       Doxychel [Doxycycline Hyclate]      Gabapentin      Rectalurgency      Tape [Adhesive Tape]      Zoloft      Wt gain       Latex Allergy: NO    Social History   Substance Use Topics     Smoking status: Current Some Day Smoker     Packs/day: 0.50     Types: Cigarettes     Last attempt to quit: 5/30/2017     Smokeless tobacco: Never Used     Alcohol use No     Quit in the past 5/30/17 during hospitalization for 5 months  Patches in hospital and Nicorette gum after      History   Drug Use No       REVIEW OF SYSTEMS:   CONSTITUTIONAL: NEGATIVE for fever, chills, change in weight  INTEGUMENTARY/SKIN: NEGATIVE for worrisome rashes, moles or lesions  EYES:eye pressure to left eye  ENT/MOUTH: NEGATIVE for ear, mouth and throat problems  RESP: NEGATIVE for significant cough or SOB  BREAST: NEGATIVE for masses, tenderness or discharge  CV: NEGATIVE for chest pain, palpitations or peripheral edema  GI: NEGATIVE for nausea, abdominal pain, heartburn, or change in bowel habits  : NEGATIVE for frequency, dysuria, or  "hematuria  MUSCULOSKELETAL: bilateral knee pain, lumbar pain L3, T12 and L1  NEURO: NEGATIVE for weakness, dizziness or paresthesias  ENDOCRINE: NEGATIVE for temperature intolerance, skin/hair changes  HEME: NEGATIVE for bleeding problems  PSYCHIATRIC: NEGATIVE for changes in mood or affect    EXAM:   /64 (BP Location: Right arm, Patient Position: Sitting, Cuff Size: Adult Large)  Pulse 86  Temp 97.6  F (36.4  C) (Tympanic)  Ht 5' 1.25\" (1.556 m)  Wt 161 lb (73 kg)  SpO2 97%  BMI 30.17 kg/m2    GENERAL APPEARANCE: healthy, alert and no distress     EYES: EOMI, PERRL     HENT: ear canals and TM's normal and nose and mouth without ulcers or lesions     NECK: no adenopathy, no asymmetry, masses, or scars and thyroid normal to palpation     RESP: lungs clear to auscultation - no rales, rhonchi or wheezes     CV: regular rates and rhythm, normal S1 S2, no S3 or S4 and no murmur, click or rub     ABDOMEN:  soft, nontender, no HSM or masses and bowel sounds normal     MS: extremities normal- no gross deformities noted, no evidence of inflammation in joints, FROM in all extremities.     SKIN: no suspicious lesions or rashes     NEURO: Normal strength and tone, sensory exam grossly normal, mentation intact and speech normal     PSYCH: mentation appears normal. and affect normal/bright     LYMPHATICS: No cervical adenopathy    DIAGNOSTICS:   EKG: (done 3/9/2018) appears normal, NSR, normal axis, normal intervals, no acute ST/T changes c/w ischemia, no LVH by voltage criteria, unchanged from previous tracings  Results for orders placed or performed in visit on 05/09/18   Hemoglobin   Result Value Ref Range    Hemoglobin 12.1 11.7 - 15.7 g/dL       Recent Labs   Lab Test  03/09/18   1006  10/18/17   1310  05/08/17   1530  05/08/17   1428   03/12/12   1105   HGB  11.6*  12.2   --   13.0   < >   --    PLT  366   --    --   270   < >   --    INR  1.22*   --   0.92   --    --    --    NA  135   --    --   138   < >   -- "    POTASSIUM  3.9   --    --   3.8   < >   --    CR  0.74   --    --   0.70   < >   --    A1C   --    --    --    --    --   5.8    < > = values in this interval not displayed.        IMPRESSION:   Reason for surgery/procedure:  transforaminal epidural steroid injection, thoracic 12 - lumbar 1  Diagnosis/reason for consult: Pre-op and HP    The proposed surgical procedure is considered LOW risk.    REVISED CARDIAC RISK INDEX  The patient has the following serious cardiovascular risks for perioperative complications such as (MI, PE, VFib and 3  AV Block):  No serious cardiac risks  INTERPRETATION: 0 risks: Class I (very low risk - 0.4% complication rate)    The patient has the following additional risks for perioperative complications:  No identified additional risks    - SMOKING CESSATION COUNSELING 3-10 minutes    ICD-10-CM    1. Preop general physical exam Z01.818    2. Displacement of lumbar intervertebral disc without myelopathy M51.26    3. Graves disease E05.00 TSH with free T4 reflex   4. Tobacco user Z72.0 nicotine (EQ NICOTINE) 7 MG/24HR 24 hr patch     SMOKING CESSATION COUNSELING 3-10 MIN   5. CATALINA (obstructive sleep apnea) G47.33 SLEEP EVALUATION & MANAGEMENT REFERRAL - [facility] [phone] (Age 14 if over 100 lbs)   6. Encounter for smoking cessation counseling Z71.6 buPROPion (WELLBUTRIN SR) 150 MG 12 hr tablet    Z72.0    7. Hemoglobin low D64.9 Hemoglobin       RECOMMENDATIONS:     APPROVAL GIVEN to proceed with proposed procedure, without further diagnostic evaluation     Signed Electronically by: Trinidad Drummond CNP    Copy of this evaluation report is provided to requesting physician.  Patient Instructions     1. Preop general physical exam    2. Displacement of lumbar intervertebral disc without myelopathy  Chronic, stable  - Continue to follow with Dr. Alonso    3. Graves disease  Chronic, stable  - TSH with free T4 reflex  - Continue to follow with  Endocrinology as needed    4. Tobacco user  Chronic, stable  - nicotine (EQ NICOTINE) 7 MG/24HR 24 hr patch; Place 1 patch onto the skin every 24 hours (May use 2 patches if needed per day)  Dispense: 30 patch; Refill: 6  - Use Nicorette gum whenever you'd like    5. CATALINA (obstructive sleep apnea)  Chronic, untreated  - SLEEP EVALUATION & MANAGEMENT REFERRAL - ADULT -[facility] [phone] (Age 14 if over 100 lbs); Future    6. Hemoglobin low  Historical, needs recheck  - Hemoglobin    Before Your Surgery      Call your surgeon if there is any change in your health. This includes signs of a cold or flu (such as a sore throat, runny nose, cough, rash or fever).    Do not smoke, drink alcohol or take over the counter medicine (unless your surgeon or primary care doctor tells you to) for the 24 hours before and after surgery.    If you take prescribed drugs: Follow your doctor s orders about which medicines to take and which to stop until after surgery.    Eating and drinking prior to surgery: follow the instructions from your surgeon    Take a shower or bath the night before surgery. Use the soap your surgeon gave you to gently clean your skin. If you do not have soap from your surgeon, use your regular soap. Do not shave or scrub the surgery site.  Wear clean pajamas and have clean sheets on your bed.       [facility] Pre[facility] Guidelines    Revised Cardiac Risk Index

## 2018-05-10 LAB — TSH SERPL DL<=0.005 MIU/L-ACNC: 0.8 MU/L (ref 0.4–4)

## 2018-05-10 NOTE — PROGRESS NOTES
Dear Vesta,  TSH (thyroid)- normal  Please contact our clinic via phone or My Chart if you have any questions or concerns.  Thanks,  CHRISTEN Hernadez

## 2018-05-23 NOTE — H&P (VIEW-ONLY)
[facility]  [address] [facility] [address]  [phone]  Dept: [phone]    PRE-OP EVALUATION:  Today's date: 2018    Vesta Rodriguez (: 1960) presents for pre-operative evaluation assessment as requested by Pérez Mobley MD.  She requires evaluation and anesthesia risk assessment prior to undergoing surgery/procedure for treatment of lumbar spondylosis .    Proposed Surgery/ Procedure: transforaminal epidural steroid injection, thoracic 12 - lumbar 1  Date of Surgery/ Procedure: 2018  Time of Surgery/ Procedure: 9:30 AM   Hospital/Surgical Facility: [facility]   Primary Physician: Trinidad Drummond  Type of Anesthesia Anticipated: MAC    Patient has a Health Care Directive or Living Will:  YES     1. NO - Do you have a history of heart attack, stroke, stent, bypass or surgery on an artery in the head, neck, heart or legs?  2. NO - Do you ever have any pain or discomfort in your chest?  3. NO - Do you have a history of  Heart Failure?  4. NO - Are you troubled by shortness of breath when: walking on the level, up a slight hill or at night?  5. NO - Do you currently have a cold, bronchitis or other respiratory infection?  6. NO - Do you have a cough, shortness of breath or wheezing?  7. NO - Do you sometimes get pains in the calves of your legs when you walk?  8. NO - Do you or anyone in your family have previous history of blood clots?  9. NO - Do you or does anyone in your family have a serious bleeding problem such as prolonged bleeding following surgeries or cuts?  10. NO - Have you ever had problems with anemia or been told to take iron pills?  11. YES - HAVE YOU HAD ANY ABNORMAL BLOOD LOSS SUCH AS BLACK, TARRY OR BLOODY STOOLS, OR ABNORMAL VAGINAL BLEEDING? 2018 GI bleed colitis ([place]) , 2012 GI bleed   12. YES - Have you ever had a blood transfusion?  Low hemoglobin unknown cause   13. NO - Have you or any of your  relatives ever had problems with anesthesia?   14. YES - DO YOU HAVE SLEEP APNEA, EXCESSIVE SNORING OR DAYTIME DROWSINESS? Sleep apnea, she doesn't use CPAP  15. NO - Do you have any prosthetic heart valves?  16. NO - Do you have prosthetic joints?  17. NO - Is there any chance that you may be pregnant?      HPI:     HPI related to upcoming procedure: epidural injection. She has a long-standing history of chronic low back pain with sciatica. She has also had neck pain. She has had L5- S1 fusion and L1 Kyphoplasty. She has had C5-7 anterior fusion. She has had L2-3 trans foraminal epidural steroid injection 3/9/2017. She follows with Dr. Alonso, orthopedics.     INFECTIOUS COLITIS on 2/26/2018. No longer having symptoms.    COPD - Patient has a longstanding history of mild COPD . Patient has been doing well overall noting NO SYMPTOMS and continues on medication regimen consisting of Advair and Albuterol inhaler without adverse reactions or side effects.                                                                                                           GRAVES DISEASE- Patient has a longstanding history of chronic Graves disease managed by Methimazole. She is always cold. She follows with [facility]. Repeat TSH drawn today- result pending.  Lab Results   Component Value Date    TSH 0.85 05/08/2017                                                                                                                                                             SLEEP PROBLEM - Patient has a longstanding history of snoring and periodic leg movement.. Patient has tried OTC medications with limited success. Dr. Barahona in [place] .                                   TOBACCO USE- 5-10 cigarettes daily. She quit 5,2017 when she was hospitalized and remained smoke free for 5 months. She then became frustrated with her back pain and re-started. She used Nicotine patch and Nicorette gum. She is aware how  important cessation is for MS issues and COPD.                                                                                                        MEDICAL HISTORY:     Patient Active Problem List    Diagnosis Date Noted     CATALINA (obstructive sleep apnea) 05/09/2018     Priority: High     Sleep apnea 05/09/2018     Priority: High     Overview:   does not use CPAP due to insomnia       Major depressive disorder, recurrent episode, moderate (H) 10/24/2016     Priority: High     Tobacco user 08/28/2011     Priority: High     COPD (chronic obstructive pulmonary disease); spirometry 3/10 ; rare use of mdi 03/15/2010     Priority: High     Anxiety 09/26/2008     Priority: High     Osteoporosis 07/06/2005     Priority: High     Hip fracture after bone graft.     forteo 3/09 - until summer 2010  Problem list name updated by automated process. Provider to review       Spondylolisthesis of lumbar region 05/09/2018     Priority: Medium     Colitis 02/26/2018     Priority: Medium     S/P lumbar fusion 06/09/2017     Priority: Medium     Lumbar stenosis 05/30/2017     Priority: Medium     S/P cholecystectomy 08/04/2016     Priority: Medium     Graves disease 01/25/2016     Priority: Medium     Alcoholism (H) 11/19/2014     Priority: Medium     Sober since 2014.         Thyroid nodule; on outsie dc,due for f/u 5/13 04/19/2013     Priority: Medium     ultrasound may , 2013  Due for followup          Compression fracture 01/16/2013     Priority: Medium     GERD (gastroesophageal reflux disease) 01/16/2013     Priority: Medium     Chronic cholecystitis 09/04/2012     Priority: Medium     Vitamin D deficiency 08/28/2011     Priority: Medium     Disturbance in sleep behavior 10/08/2007     Priority: Medium     Has failed mult meds, - lunesta, ambien, klonopin  etoh treatment 2013  Not candidate for klonopin      Problem list name updated by automated process. Provider to review       Displacement of cervical intervertebral disc  without myelopathy 10/08/2007     Priority: Medium     Cervical fusion. [facility].     2004       Restless legs syndrome (RLS) 07/30/2007     Priority: Medium     On mirapex, unable to go off     0.5 mg twice a day , for rls        Displacement of lumbar intervertebral disc without myelopathy 02/20/2006     Priority: Medium     2 disc fusion        Brachial neuritis or radiculitis 07/06/2005     Priority: Medium     Problem list name updated by automated process. Provider to review       Advance Care Planning 03/08/2017     Priority: Low     Advance Care Planning 7/5/2017: Recommend Code Status in chart and patient's Advance Care Planning documents be reviewed to ensure alignment with patient's current wishes. Added by Mary Kay Forman [facility] Advance Care Planning Liaison  Advance Care Planning 3/8/2017: ACP Review of Chart / Resources Provided:  Reviewed chart for advance care plan.  Vesta Rodriguez has been provided information and resources to begin or update their advance care plan.  Added by Vesta Bradley       H/O vertebroplasty 08/12/2013     Priority: Low     H/O of right ureteral stone 10/05/2012     Priority: Low     8/1612         H/O: hysterectomy 08/19/2012     Priority: Low     Family history of breast cancer in mother 08/28/2011     Priority: Low     CARDIOVASCULAR SCREENING; LDL GOAL LESS THAN 130 10/31/2010     Priority: Low      Past Medical History:   Diagnosis Date     Anemia      Anemia due to blood loss, acute 10/21/2012     Anxiety      Back pain      Chronic cholecystitis      Degeneration of cervical intervertebral disc     04     Degeneration of lumbar or lumbosacral intervertebral disc      Major depression in remission (H)      Osteoporosis      Right ureteral stone      RLS (restless legs syndrome)      Sleep disturbance      UTI (urinary tract infection)     RECENT- ON ANTIBIOTIC, STILL BACTERIA IN URINE     Vitamin D deficiency      Past Surgical History:   Procedure Laterality Date      FUSION CERVICAL ANTERIOR TWO LEVELS      C 5-7     FUSION LUMBAR ANTERIOR THREE+ LEVELS      L3- SI     HYSTERECTOMY, DEYSI      no oophorectomy     INJECT EPIDURAL TRANSFORAMINAL Bilateral 3/9/2017    Procedure: INJECT EPIDURAL TRANSFORAMINAL;  Surgeon: Pérez Valle MD;  Location: [facility] OR     LAPAROSCOPIC CHOLECYSTECTOMY  2012    Procedure: LAPAROSCOPIC CHOLECYSTECTOMY;  Laparoscopic vs Open Cholecystectomy;  Surgeon: Esperanza Quinn MD;  Location: [facility]     LASER HOLMIUM LITHOTRIPSY URETER(S), INSERT STENT, COMBINED  2012    Procedure: COMBINED CYSTOSCOPY, URETEROSCOPY, LASER HOLMIUM LITHOTRIPSY URETER(S), INSERT STENT;  Cystoscopy, Right Ureteroscopy Holmium Laser Lithotripsy , right ureteral stent placement *Latex Safe*;  Surgeon: Joshua Rm MD;  Location: [facility] OR     SURGICAL HISTORY OF -        section     SURGICAL HISTORY OF -       Tubal ligation     SURGICAL HISTORY OF -       Post hip fracture     SURGICAL HISTORY OF -       hardware removal, right iliac crest      VERTEBROPLASTY       Current Outpatient Prescriptions   Medication Sig Dispense Refill     albuterol (PROAIR HFA/PROVENTIL HFA/VENTOLIN HFA) 108 (90 BASE) MCG/ACT Inhaler Inhale 2 puffs into the lungs every 6 hours as needed for shortness of breath / dyspnea or wheezing 1 Inhaler 1     alendronate (FOSAMAX) 70 MG tablet Take 70 mg by mouth every 7 days       buPROPion (WELLBUTRIN SR) 150 MG 12 hr tablet Take 1 tablet (150 mg) by mouth 2 times daily 180 tablet 3     fluticasone-salmeterol (ADVAIR) 100-50 MCG/DOSE diskus inhaler Inhale 1 puff into the lungs 2 times daily 2 Inhaler 10     hydrOXYzine (ATARAX) 25 MG tablet Take 25-50 mg by mouth 3 times daily as needed for itching       methimazole (TAPAZOLE) 5 MG tablet Take 1 tablet (5 mg) by mouth daily 90 tablet 3     nicotine (EQ NICOTINE) 7 MG/24HR 24 hr patch Place 1 patch onto the skin every 24 hours (May use 2 patches if needed per  day) 30 patch 6     order for DME Equipment being ordered: CPAP       pregabalin (LYRICA) 75 MG capsule Take 75 mg by mouth 2 times daily       vitamin D (ERGOCALCIFEROL) 23196 UNIT capsule Take 50,000 Units by mouth once a week       [DISCONTINUED] pramipexole (MIRAPEX) 1.5 MG tablet Take 1 tablet (1.5 mg) by mouth 2 times daily 180 tablet 0     pramipexole (MIRAPEX) 1.5 MG tablet Take 1 tablet (1.5 mg) by mouth 2 times daily 180 tablet 0     [DISCONTINUED] buPROPion (WELLBUTRIN SR) 150 MG 12 hr tablet Take 1 tablet (150 mg) by mouth daily for 3 days then increase to 150 mg twice daily. 180 tablet 3     OTC products: None, except as noted above    Allergies   Allergen Reactions     Ambien [Zolpidem Tartrate]      Mental status changes     Asa [Aspirin]      Accidental overdose.     Celexa [Citalopram Hydrobromide]      review with pt., i cant' find side effect in chart. 2/9/06 Sylvie Mederos MD       Doxychel [Doxycycline Hyclate]      Gabapentin      Rectalurgency      Tape [Adhesive Tape]      Zoloft      Wt gain       Latex Allergy: NO    Social History   Substance Use Topics     Smoking status: Current Some Day Smoker     Packs/day: 0.50     Types: Cigarettes     Last attempt to quit: 5/30/2017     Smokeless tobacco: Never Used     Alcohol use No     Quit in the past 5/30/17 during hospitalization for 5 months  Patches in hospital and Nicorette gum after      History   Drug Use No       REVIEW OF SYSTEMS:   CONSTITUTIONAL: NEGATIVE for fever, chills, change in weight  INTEGUMENTARY/SKIN: NEGATIVE for worrisome rashes, moles or lesions  EYES:eye pressure to left eye  ENT/MOUTH: NEGATIVE for ear, mouth and throat problems  RESP: NEGATIVE for significant cough or SOB  BREAST: NEGATIVE for masses, tenderness or discharge  CV: NEGATIVE for chest pain, palpitations or peripheral edema  GI: NEGATIVE for nausea, abdominal pain, heartburn, or change in bowel habits  : NEGATIVE for frequency, dysuria, or  "hematuria  MUSCULOSKELETAL: bilateral knee pain, lumbar pain L3, T12 and L1  NEURO: NEGATIVE for weakness, dizziness or paresthesias  ENDOCRINE: NEGATIVE for temperature intolerance, skin/hair changes  HEME: NEGATIVE for bleeding problems  PSYCHIATRIC: NEGATIVE for changes in mood or affect    EXAM:   /64 (BP Location: Right arm, Patient Position: Sitting, Cuff Size: Adult Large)  Pulse 86  Temp 97.6  F (36.4  C) (Tympanic)  Ht 5' 1.25\" (1.556 m)  Wt 161 lb (73 kg)  SpO2 97%  BMI 30.17 kg/m2    GENERAL APPEARANCE: healthy, alert and no distress     EYES: EOMI, PERRL     HENT: ear canals and TM's normal and nose and mouth without ulcers or lesions     NECK: no adenopathy, no asymmetry, masses, or scars and thyroid normal to palpation     RESP: lungs clear to auscultation - no rales, rhonchi or wheezes     CV: regular rates and rhythm, normal S1 S2, no S3 or S4 and no murmur, click or rub     ABDOMEN:  soft, nontender, no HSM or masses and bowel sounds normal     MS: extremities normal- no gross deformities noted, no evidence of inflammation in joints, FROM in all extremities.     SKIN: no suspicious lesions or rashes     NEURO: Normal strength and tone, sensory exam grossly normal, mentation intact and speech normal     PSYCH: mentation appears normal. and affect normal/bright     LYMPHATICS: No cervical adenopathy    DIAGNOSTICS:   EKG: (done 3/9/2018) appears normal, NSR, normal axis, normal intervals, no acute ST/T changes c/w ischemia, no LVH by voltage criteria, unchanged from previous tracings  Results for orders placed or performed in visit on 05/09/18   Hemoglobin   Result Value Ref Range    Hemoglobin 12.1 11.7 - 15.7 g/dL       Recent Labs   Lab Test  03/09/18   1006  10/18/17   1310  05/08/17   1530  05/08/17   1428   03/12/12   1105   HGB  11.6*  12.2   --   13.0   < >   --    PLT  366   --    --   270   < >   --    INR  1.22*   --   0.92   --    --    --    NA  135   --    --   138   < >   -- "    POTASSIUM  3.9   --    --   3.8   < >   --    CR  0.74   --    --   0.70   < >   --    A1C   --    --    --    --    --   5.8    < > = values in this interval not displayed.        IMPRESSION:   Reason for surgery/procedure:  transforaminal epidural steroid injection, thoracic 12 - lumbar 1  Diagnosis/reason for consult: Pre-op and HP    The proposed surgical procedure is considered LOW risk.    REVISED CARDIAC RISK INDEX  The patient has the following serious cardiovascular risks for perioperative complications such as (MI, PE, VFib and 3  AV Block):  No serious cardiac risks  INTERPRETATION: 0 risks: Class I (very low risk - 0.4% complication rate)    The patient has the following additional risks for perioperative complications:  No identified additional risks    - SMOKING CESSATION COUNSELING 3-10 minutes    ICD-10-CM    1. Preop general physical exam Z01.818    2. Displacement of lumbar intervertebral disc without myelopathy M51.26    3. Graves disease E05.00 TSH with free T4 reflex   4. Tobacco user Z72.0 nicotine (EQ NICOTINE) 7 MG/24HR 24 hr patch     SMOKING CESSATION COUNSELING 3-10 MIN   5. CATALINA (obstructive sleep apnea) G47.33 SLEEP EVALUATION & MANAGEMENT REFERRAL - [facility] [phone] (Age 14 if over 100 lbs)   6. Encounter for smoking cessation counseling Z71.6 buPROPion (WELLBUTRIN SR) 150 MG 12 hr tablet    Z72.0    7. Hemoglobin low D64.9 Hemoglobin       RECOMMENDATIONS:     APPROVAL GIVEN to proceed with proposed procedure, without further diagnostic evaluation     Signed Electronically by: Trinidad Drummond CNP    Copy of this evaluation report is provided to requesting physician.  Patient Instructions     1. Preop general physical exam    2. Displacement of lumbar intervertebral disc without myelopathy  Chronic, stable  - Continue to follow with Dr. Alonso    3. Graves disease  Chronic, stable  - TSH with free T4 reflex  - Continue to follow with  Endocrinology as needed    4. Tobacco user  Chronic, stable  - nicotine (EQ NICOTINE) 7 MG/24HR 24 hr patch; Place 1 patch onto the skin every 24 hours (May use 2 patches if needed per day)  Dispense: 30 patch; Refill: 6  - Use Nicorette gum whenever you'd like    5. CATALINA (obstructive sleep apnea)  Chronic, untreated  - SLEEP EVALUATION & MANAGEMENT REFERRAL - ADULT -[facility] [phone] (Age 14 if over 100 lbs); Future    6. Hemoglobin low  Historical, needs recheck  - Hemoglobin    Before Your Surgery      Call your surgeon if there is any change in your health. This includes signs of a cold or flu (such as a sore throat, runny nose, cough, rash or fever).    Do not smoke, drink alcohol or take over the counter medicine (unless your surgeon or primary care doctor tells you to) for the 24 hours before and after surgery.    If you take prescribed drugs: Follow your doctor s orders about which medicines to take and which to stop until after surgery.    Eating and drinking prior to surgery: follow the instructions from your surgeon    Take a shower or bath the night before surgery. Use the soap your surgeon gave you to gently clean your skin. If you do not have soap from your surgeon, use your regular soap. Do not shave or scrub the surgery site.  Wear clean pajamas and have clean sheets on your bed.       [facility] Guidelines    Revised Cardiac Risk Index

## 2018-05-24 ENCOUNTER — SURGERY (OUTPATIENT)
Age: 58
End: 2018-05-24

## 2018-05-24 ENCOUNTER — ANESTHESIA EVENT (OUTPATIENT)
Dept: SURGERY | Facility: CLINIC | Age: 58
End: 2018-05-24
Payer: MEDICARE

## 2018-05-24 ENCOUNTER — HOSPITAL ENCOUNTER (OUTPATIENT)
Facility: CLINIC | Age: 58
Discharge: HOME | End: 2018-05-24
Attending: ANESTHESIOLOGY | Admitting: ANESTHESIOLOGY
Payer: MEDICARE

## 2018-05-24 ENCOUNTER — ANESTHESIA (OUTPATIENT)
Dept: SURGERY | Facility: CLINIC | Age: 58
End: 2018-05-24
Payer: MEDICARE

## 2018-05-24 ENCOUNTER — HOSPITAL ENCOUNTER (OUTPATIENT)
Dept: GENERAL RADIOLOGY | Facility: CLINIC | Age: 58
End: 2018-05-24
Attending: ANESTHESIOLOGY
Payer: MEDICARE

## 2018-05-24 VITALS
TEMPERATURE: 97.6 F | DIASTOLIC BLOOD PRESSURE: 59 MMHG | RESPIRATION RATE: 16 BRPM | SYSTOLIC BLOOD PRESSURE: 96 MMHG | OXYGEN SATURATION: 98 %

## 2018-05-24 DIAGNOSIS — M54.9 BACK PAIN: ICD-10-CM

## 2018-05-24 PROCEDURE — 25000125 ZZHC RX 250: Performed by: NURSE ANESTHETIST, CERTIFIED REGISTERED

## 2018-05-24 PROCEDURE — 37000008 ZZH ANESTHESIA TECHNICAL FEE, 1ST 30 MIN: Performed by: ANESTHESIOLOGY

## 2018-05-24 PROCEDURE — 64479 NJX AA&/STRD TFRM EPI C/T 1: CPT | Mod: 50 | Performed by: ANESTHESIOLOGY

## 2018-05-24 PROCEDURE — 25000128 H RX IP 250 OP 636: Performed by: ANESTHESIOLOGY

## 2018-05-24 PROCEDURE — 62323 NJX INTERLAMINAR LMBR/SAC: CPT | Performed by: ANESTHESIOLOGY

## 2018-05-24 PROCEDURE — 40000277 XR SURGERY CARM FLUORO LESS THAN 5 MIN W STILLS: Mod: TC

## 2018-05-24 PROCEDURE — 25000128 H RX IP 250 OP 636: Performed by: NURSE ANESTHETIST, CERTIFIED REGISTERED

## 2018-05-24 RX ORDER — LIDOCAINE 40 MG/G
CREAM TOPICAL
Status: DISCONTINUED | OUTPATIENT
Start: 2018-05-24 | End: 2018-05-24 | Stop reason: HOSPADM

## 2018-05-24 RX ORDER — SODIUM CHLORIDE, SODIUM LACTATE, POTASSIUM CHLORIDE, CALCIUM CHLORIDE 600; 310; 30; 20 MG/100ML; MG/100ML; MG/100ML; MG/100ML
INJECTION, SOLUTION INTRAVENOUS CONTINUOUS
Status: CANCELLED | OUTPATIENT
Start: 2018-05-24

## 2018-05-24 RX ORDER — ONDANSETRON 4 MG/1
4 TABLET, ORALLY DISINTEGRATING ORAL EVERY 30 MIN PRN
Status: CANCELLED | OUTPATIENT
Start: 2018-05-24

## 2018-05-24 RX ORDER — IOPAMIDOL 612 MG/ML
INJECTION, SOLUTION INTRATHECAL PRN
Status: DISCONTINUED | OUTPATIENT
Start: 2018-05-24 | End: 2018-05-24 | Stop reason: HOSPADM

## 2018-05-24 RX ORDER — PROPOFOL 10 MG/ML
INJECTION, EMULSION INTRAVENOUS PRN
Status: DISCONTINUED | OUTPATIENT
Start: 2018-05-24 | End: 2018-05-24

## 2018-05-24 RX ORDER — LIDOCAINE HYDROCHLORIDE 20 MG/ML
INJECTION, SOLUTION INFILTRATION; PERINEURAL PRN
Status: DISCONTINUED | OUTPATIENT
Start: 2018-05-24 | End: 2018-05-24

## 2018-05-24 RX ORDER — DEXAMETHASONE SODIUM PHOSPHATE 4 MG/ML
INJECTION, SOLUTION INTRA-ARTICULAR; INTRALESIONAL; INTRAMUSCULAR; INTRAVENOUS; SOFT TISSUE PRN
Status: DISCONTINUED | OUTPATIENT
Start: 2018-05-24 | End: 2018-05-24 | Stop reason: HOSPADM

## 2018-05-24 RX ORDER — MEPERIDINE HYDROCHLORIDE 50 MG/ML
12.5 INJECTION INTRAMUSCULAR; INTRAVENOUS; SUBCUTANEOUS
Status: CANCELLED | OUTPATIENT
Start: 2018-05-24

## 2018-05-24 RX ORDER — NALOXONE HYDROCHLORIDE 0.4 MG/ML
.1-.4 INJECTION, SOLUTION INTRAMUSCULAR; INTRAVENOUS; SUBCUTANEOUS
Status: CANCELLED | OUTPATIENT
Start: 2018-05-24 | End: 2018-05-25

## 2018-05-24 RX ORDER — ONDANSETRON 2 MG/ML
4 INJECTION INTRAMUSCULAR; INTRAVENOUS EVERY 30 MIN PRN
Status: CANCELLED | OUTPATIENT
Start: 2018-05-24

## 2018-05-24 RX ADMIN — LIDOCAINE HYDROCHLORIDE 1 ML: 10 INJECTION, SOLUTION EPIDURAL; INFILTRATION; INTRACAUDAL; PERINEURAL at 09:06

## 2018-05-24 RX ADMIN — DEXAMETHASONE SODIUM PHOSPHATE 10 MG: 4 INJECTION, SOLUTION INTRAMUSCULAR; INTRAVENOUS at 09:22

## 2018-05-24 RX ADMIN — IOPAMIDOL 5 ML: 612 INJECTION, SOLUTION INTRATHECAL at 09:22

## 2018-05-24 RX ADMIN — SODIUM BICARBONATE 5 MEQ: 84 INJECTION, SOLUTION INTRAVENOUS at 09:22

## 2018-05-24 RX ADMIN — LIDOCAINE HYDROCHLORIDE 3 ML: 10 INJECTION, SOLUTION EPIDURAL; INFILTRATION; INTRACAUDAL; PERINEURAL at 09:22

## 2018-05-24 RX ADMIN — PROPOFOL 40 MG: 10 INJECTION, EMULSION INTRAVENOUS at 09:19

## 2018-05-24 RX ADMIN — LIDOCAINE HYDROCHLORIDE 40 MG: 20 INJECTION, SOLUTION INFILTRATION; PERINEURAL at 09:17

## 2018-05-24 ASSESSMENT — COPD QUESTIONNAIRES
COPD: 1
CAT_SEVERITY: MILD

## 2018-05-24 ASSESSMENT — LIFESTYLE VARIABLES: TOBACCO_USE: 1

## 2018-05-24 NOTE — ANESTHESIA PREPROCEDURE EVALUATION
Anesthesia Evaluation     . Pt has had prior anesthetic. Type: General and MAC           ROS/MED HX    ENT/Pulmonary:     (+)tobacco use, Current use mild COPD, , . .    Neurologic:     (+)other neuro restless leg syndrome    Cardiovascular:         METS/Exercise Tolerance:     Hematologic:         Musculoskeletal:   (+) arthritis, , , other musculoskeletal- lumbar disc displacement/chronic low back pain      GI/Hepatic:         Renal/Genitourinary:         Endo:     (+) thyroid problem  Thyroid disease - Other nodules, .      Psychiatric:     (+) psychiatric history anxiety and depression      Infectious Disease:  - neg infectious disease ROS       Malignancy:      - no malignancy   Other:    (+) No chance of pregnancy C-spine cleared: N/A, H/O Chronic Pain,H/O chronic opiod use ,                    Physical Exam  Normal systems: cardiovascular, pulmonary and dental    Airway   Mallampati: II  TM distance: >3 FB  Neck ROM: full    Dental     Cardiovascular   Rhythm and rate: regular and normal      Pulmonary    breath sounds clear to auscultation                    Anesthesia Plan      History & Physical Review  History and physical reviewed and following examination; no interval change.    ASA Status:  3 .    NPO Status:  > 8 hours    Plan for MAC with Intravenous and Propofol induction. Maintenance will be TIVA.  Reason for MAC:  Deep or markedly invasive procedure (G8)         Postoperative Care      Consents  Anesthetic plan, risks, benefits and alternatives discussed with:  Patient..                          .

## 2018-05-24 NOTE — ANESTHESIA CARE TRANSFER NOTE
Patient: Vesta A Michael    Procedure(s):  transforaminal epidural steroid injection, thoracic 12 - lumbar 1 - Wound Class: I-Clean    Diagnosis: back pain syndrome, radiculopathy thor/lumb neuritis  Diagnosis Additional Information: No value filed.    Anesthesia Type:   MAC     Note:  Airway :Room Air  Patient transferred to:Phase II        Vitals: (Last set prior to Anesthesia Care Transfer)    CRNA VITALS  5/24/2018 0904 - 5/24/2018 0939      5/24/2018             Pulse: 79    SpO2: 98 %    Resp Rate (observed): 8                Electronically Signed By: JAVIER Schultz CRNA  May 24, 2018  9:39 AM

## 2018-05-24 NOTE — DISCHARGE INSTRUCTIONS
Home Care Instructions                Procedure:  Epidural Steroid Injection or Joint injection    Activity:    Rest today    Do not work today    Resume normal activity tomorrow    Pain:    You may experience soreness at the injection site for one or two days    You may use an ice pack for 20 minutes every 2 hours for the first 24 hours    You may use a heating pad after the first 24 hours    You may use Tylenol  (acetaminophen) every 4 hours or other pain medicines as directed by your physician    Safety  Sedation medicine, if given may remain active for many hours.    It is important for the next 24 hours that you do not:    Drive a car    Operate machines or power tools    Consume alcohol, including beer    Sign any important papers or legal documents    You may experience numbness radiating into your legs or arms, (depending on the procedure location)  This numbness may last several hours.  Until the numb sensation returns to normal please use caution in walking, climbing stairs, stepping out of your vehicle, etc.    Common side effects of steroids:  Not everyone will experience corticosteroid side effects. If side effects are experienced they will gradually subside in the 7-10 day period following an injection.    Most common side effects include:    Flushed face and/or chest    Feeling of warmth, particularly in face but could be overall feeling of warmth    Increased blood sugar in diabetic patients    Menstrual irregularities may occur.  If taking hormone based birth control an alternate method of birth control is recommended    Sleep disturbances and/or mood swings are possible    Leg cramps    Please contact us if you have:  Severe pain   Fever more than 101.5 degrees Fahrenheit  Signs of infection (redness, swelling or drainage)      If you have questions during normal business hours (8am-5pm Monday-Friday) contact the [facility] at [phone]. If you need help after hours, we recommend that  you go to a hospital emergency room or dial 911.

## 2018-05-24 NOTE — ANESTHESIA POSTPROCEDURE EVALUATION
Patient: Vesta A Michael    Procedure(s):  transforaminal epidural steroid injection, thoracic 12 - lumbar 1 - Wound Class: I-Clean    Diagnosis:back pain syndrome, radiculopathy thor/lumb neuritis  Diagnosis Additional Information: No value filed.    Anesthesia Type:  MAC    Note:  Anesthesia Post Evaluation    Patient location during evaluation: Phase 2  Patient participation: Able to fully participate in evaluation  Level of consciousness: awake and alert  Pain management: adequate  Airway patency: patent  Cardiovascular status: blood pressure returned to baseline  Respiratory status: spontaneous ventilation  Hydration status: acceptable  PONV: none     Anesthetic complications: None    Comments: Patient resting without complaint. No anesthesia concerns.         Last vitals:  Vitals:    05/24/18 0856 05/24/18 0936 05/24/18 0945   BP: 99/58 92/67 96/59   Resp: 16 16 16   Temp:      SpO2:  98% 98%         Electronically Signed By: JAVIER Schultz CRNA  May 24, 2018  10:13 AM

## 2018-05-24 NOTE — IP AVS SNAPSHOT
[facility]    [address]     [place] [address]    Phone:  [phone]                                       After Visit Summary   5/24/2018    Vesta Rodriguez    MRN: [id number]           After Visit Summary Signature Page     I have received my discharge instructions, and my questions have been answered. I have discussed any challenges I see with this plan with the nurse or doctor.    ..........................................................................................................................................  Patient/Patient Representative Signature      ..........................................................................................................................................  Patient Representative Print Name and Relationship to Patient    ..................................................               ................................................  Date                                            Time    ..........................................................................................................................................  Reviewed by Signature/Title    ...................................................              ..............................................  Date                                                            Time

## 2018-05-24 NOTE — OP NOTE
PRIMARY PROBLEM: Low back pain    PROCEDURE: Bilateral T12-L1  Transforaminal Epidural Steroid Injections with fluoroscopic guidance and contrast.     PROCEDURE DETAILS: After written informed consent was obtained from the patient, the patient was escorted to the procedure room.  The patient was placed in the prone position.  A  time out  was conducted to verify patient identity, procedure to be performed, side, site, allergies and any special requirements.  The skin over the thoracolumbar region was prepped and draped in normal sterile fashion. Fluoroscopy was used to identify the neural foramen in AP view and the skin was anesthetized with 2 mL of 1% lidocaine with bicarbonate buffer. A 25-gauge Quincke spinal needle was advanced through this location and advanced under fluoroscopic guidance towards the neural foramen.  The target zone was the 6 o clock position of the pedicle.   Prior to entering the foramen, the depth of the needle was gauged with a lateral view on fluoroscopy. While still in a lateral view, the needle was slowly advanced to avoid injury to the spinal nerve.  Then, in the oblique view (approximately 28 degrees), after negative aspiration, 1.5 mL of Omnipaque contrast dye was injected revealing epidural spread without evidence of intravascular or intrathecal spread.  Then a 3cc solution of 5 mg of preservative-free dexamethasone in 1 mL of  Preservative-Free saline was slowly injected into the epidural space at each segment.  After injection of the medication, as the needle tip was withdrawn, it was flushed with local anesthetic.  This is performed bilaterally at the T12-L1 level.    The patient was monitored with blood pressure and pulse oximetry machines with the assistance of an RN throughout the procedure.  The patient was alert and responsive to questions throughout the procedure.   The patient tolerated the procedure well and was observed in the post-procedural area.  The patient was  dismissed without apparent complications.     DIAGNOSIS:  1.  Thoracolumbar spondylosis  2.  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 Performed bilateral T12-L1  transforaminal epidural steroid injections.  2. The patient was instructed to follow-up per Dr. Valle's instructions.  She is going to follow-up with [facility] in 2-3 weeks.  If this is not helpful I would recommend bilateral diagnostic SI joint injections.    Pérez Valle MD  Diplomate of the American Board of Anesthesiology, Pain Medicine

## 2018-05-24 NOTE — IP AVS SNAPSHOT
MRN:[id number]                      After Visit Summary   5/24/2018    Vesta Rodriguez    MRN: [id number]           Thank you!     Thank you for choosing [facility] for your care. Our goal is always to provide you with excellent care. Hearing back from our patients is one way we can continue to improve our services. Please take a few minutes to complete the written survey that you may receive in the mail after you visit with us. Thank you!        Patient Information     Date Of Birth          1960        About your hospital stay     You were admitted on:  May 24, 2018 You last received care in the:  [facility] Phase II    You were discharged on:  May 24, 2018       Who to Call     For medical emergencies, please call 911.  For non-urgent questions about your medical care, please call your primary care provider or clinic, [phone]  For questions related to your surgery, please call your surgery clinic        Attending Provider     Provider Specialty    Pérez Valle MD [facility]       Primary Care Provider Office Phone # Fax #    Trinidad Vasquezluana Drummond [facility] [phone]      After Care Instructions     Discharge Instructions       Review outpatient procedure discharge instructions as directed by Provider.                  Further instructions from your care team       Home Care Instructions                Procedure:  Epidural Steroid Injection or Joint injection    Activity:    Rest today    Do not work today    Resume normal activity tomorrow    Pain:    You may experience soreness at the injection site for one or two days    You may use an ice pack for 20 minutes every 2 hours for the first 24 hours    You may use a heating pad after the first 24 hours    You may use Tylenol  (acetaminophen) every 4 hours or other pain medicines as directed by your physician    Safety  Sedation medicine, if given may remain active for many hours.    It is important for the next 24  hours that you do not:    Drive a car    Operate machines or power tools    Consume alcohol, including beer    Sign any important papers or legal documents    You may experience numbness radiating into your legs or arms, (depending on the procedure location)  This numbness may last several hours.  Until the numb sensation returns to normal please use caution in walking, climbing stairs, stepping out of your vehicle, etc.    Common side effects of steroids:  Not everyone will experience corticosteroid side effects. If side effects are experienced they will gradually subside in the 7-10 day period following an injection.    Most common side effects include:    Flushed face and/or chest    Feeling of warmth, particularly in face but could be overall feeling of warmth    Increased blood sugar in diabetic patients    Menstrual irregularities may occur.  If taking hormone based birth control an alternate method of birth control is recommended    Sleep disturbances and/or mood swings are possible    Leg cramps    Please contact us if you have:  Severe pain   Fever more than 101.5 degrees Fahrenheit  Signs of infection (redness, swelling or drainage)      If you have questions during normal business hours (8am-5pm Monday-Friday) contact the [facility] at [phone]. If you need help after hours, we recommend that you go to a hospital emergency room or dial 911.                 Pending Results     No orders found from 5/22/2018 to 5/25/2018.            Admission Information     Date & Time Provider Department Dept. Phone    5/24/2018 Pérez Valle MD [facility] Phase [phone]      Your Vitals Were     Blood Pressure Temperature Respirations Pulse Oximetry          99/58 (Cuff Size: Adult Regular) 97.6  F (36.4  C) 16 97%        MyChart Information     [employer] gives you secure access to your electronic health record. If you see a primary care provider, you can also send messages to your care team  and make appointments. If you have questions, please call your primary care clinic.  If you do not have a primary care provider, please call [phone] and they will assist you.        Care EveryWhere ID     This is your Care EveryWhere ID. This could be used by other organizations to access your [facility] medical records  [id number]        Equal Access to Services     HUBERT ZAVALA : Hadrose cortes Sopanchito, waaxda jose albertoadaha, qajayta kaalmaanita espino, joshua cox . So [facility] [phone].    ATENCIÓN: Si habla español, tiene a moses disposición servicios gratuitos de asistencia lingüística. Altaame al [phone].    We comply with applicable federal civil rights laws and [place] laws. We do not discriminate on the basis of race, color, national origin, age, disability, sex, sexual orientation, or gender identity.               Review of your medicines      CONTINUE these medicines which have NOT CHANGED        Dose / Directions    albuterol 108 (90 Base) MCG/ACT Inhaler   Commonly known as:  PROAIR HFA/PROVENTIL HFA/VENTOLIN HFA   Used for:  Chronic bronchitis, unspecified chronic bronchitis type (H)        Dose:  2 puff   Inhale 2 puffs into the lungs every 6 hours as needed for shortness of breath / dyspnea or wheezing   Quantity:  1 Inhaler   Refills:  1       alendronate 70 MG tablet   Commonly known as:  FOSAMAX        Dose:  70 mg   Take 70 mg by mouth every 7 days   Refills:  0       buPROPion 150 MG 12 hr tablet   Commonly known as:  WELLBUTRIN SR   Used for:  Encounter for smoking cessation counseling        Dose:  150 mg   Take 1 tablet (150 mg) by mouth 2 times daily   Quantity:  180 tablet   Refills:  3       fluticasone-salmeterol 100-50 MCG/DOSE diskus inhaler   Commonly known as:  ADVAIR   Used for:  Chronic obstructive pulmonary disease, unspecified COPD type (H)        Dose:  1 puff   Inhale 1 puff into the lungs 2 times daily   Quantity:  2 Inhaler   Refills:  10        hydrOXYzine 25 MG tablet   Commonly known as:  ATARAX        Dose:  25-50 mg   Take 25-50 mg by mouth 3 times daily as needed for itching   Refills:  0       LYRICA 75 MG capsule   Generic drug:  pregabalin        Dose:  75 mg   Take 75 mg by mouth 2 times daily   Refills:  0       methimazole 5 MG tablet   Commonly known as:  TAPAZOLE   Used for:  Graves' disease        Dose:  5 mg   Take 1 tablet (5 mg) by mouth daily   Quantity:  90 tablet   Refills:  3       nicotine 7 MG/24HR 24 hr patch   Commonly known as:  EQ NICOTINE   Used for:  Tobacco user        Dose:  1 patch   Place 1 patch onto the skin every 24 hours (May use 2 patches if needed per day)   Quantity:  30 patch   Refills:  6       order for DME        Equipment being ordered: CPAP   Refills:  0       pramipexole 1.5 MG tablet   Commonly known as:  MIRAPEX   Used for:  Restless leg syndrome        Dose:  1.5 mg   Take 1 tablet (1.5 mg) by mouth 2 times daily   Quantity:  180 tablet   Refills:  0       vitamin D 73392 UNIT capsule   Commonly known as:  ERGOCALCIFEROL        Dose:  67501 Units   Take 50,000 Units by mouth once a week   Refills:  0                Protect others around you: Learn how to safely use, store and throw away your medicines at www.disposemymeds.org.             Medication List: This is a list of all your medications and when to take them. Check marks below indicate your daily home schedule. Keep this list as a reference.      Medications           Morning Afternoon Evening Bedtime As Needed    albuterol 108 (90 Base) MCG/ACT Inhaler   Commonly known as:  PROAIR HFA/PROVENTIL HFA/VENTOLIN HFA   Inhale 2 puffs into the lungs every 6 hours as needed for shortness of breath / dyspnea or wheezing                                alendronate 70 MG tablet   Commonly known as:  FOSAMAX   Take 70 mg by mouth every 7 days                                buPROPion 150 MG 12 hr tablet   Commonly known as:  WELLBUTRIN SR   Take 1 tablet  (150 mg) by mouth 2 times daily                                fluticasone-salmeterol 100-50 MCG/DOSE diskus inhaler   Commonly known as:  ADVAIR   Inhale 1 puff into the lungs 2 times daily                                hydrOXYzine 25 MG tablet   Commonly known as:  ATARAX   Take 25-50 mg by mouth 3 times daily as needed for itching                                LYRICA 75 MG capsule   Take 75 mg by mouth 2 times daily   Generic drug:  pregabalin                                methimazole 5 MG tablet   Commonly known as:  TAPAZOLE   Take 1 tablet (5 mg) by mouth daily                                nicotine 7 MG/24HR 24 hr patch   Commonly known as:  EQ NICOTINE   Place 1 patch onto the skin every 24 hours (May use 2 patches if needed per day)                                order for DME   Equipment being ordered: CPAP                                pramipexole 1.5 MG tablet   Commonly known as:  MIRAPEX   Take 1 tablet (1.5 mg) by mouth 2 times daily                                vitamin D 33876 UNIT capsule   Commonly known as:  ERGOCALCIFEROL   Take 50,000 Units by mouth once a week

## 2018-07-03 ENCOUNTER — MYC REFILL (OUTPATIENT)
Dept: FAMILY MEDICINE | Facility: CLINIC | Age: 58
End: 2018-07-03

## 2018-07-03 DIAGNOSIS — G25.81 RESTLESS LEG SYNDROME: ICD-10-CM

## 2018-07-03 RX ORDER — PRAMIPEXOLE DIHYDROCHLORIDE 1.5 MG/1
1.5 TABLET ORAL 2 TIMES DAILY
Qty: 180 TABLET | Refills: 0 | Status: CANCELLED | OUTPATIENT
Start: 2018-07-03

## 2018-07-03 NOTE — TELEPHONE ENCOUNTER
Message from [employer]:  Original authorizing provider: ROSELYN Aaron would like a refill of the following medications:  pramipexole (MIRAPEX) 1.5 MG tablet [Trinidad Drummond CNP]    Preferred pharmacy: [id number] - [place], [place] - [address]    Comment:

## 2018-09-10 ENCOUNTER — RADIANT APPOINTMENT (OUTPATIENT)
Dept: GENERAL RADIOLOGY | Facility: CLINIC | Age: 58
End: 2018-09-10
Attending: NURSE PRACTITIONER
Payer: MEDICARE

## 2018-09-10 ENCOUNTER — OFFICE VISIT (OUTPATIENT)
Dept: FAMILY MEDICINE | Facility: CLINIC | Age: 58
End: 2018-09-10
Payer: MEDICARE

## 2018-09-10 VITALS
TEMPERATURE: 99.2 F | DIASTOLIC BLOOD PRESSURE: 62 MMHG | SYSTOLIC BLOOD PRESSURE: 112 MMHG | HEART RATE: 86 BPM | BODY MASS INDEX: 28.26 KG/M2 | RESPIRATION RATE: 24 BRPM | OXYGEN SATURATION: 97 % | WEIGHT: 150.8 LBS

## 2018-09-10 DIAGNOSIS — J44.9 CHRONIC OBSTRUCTIVE PULMONARY DISEASE, UNSPECIFIED COPD TYPE (H): Chronic | ICD-10-CM

## 2018-09-10 DIAGNOSIS — Z72.0 TOBACCO USER: Chronic | ICD-10-CM

## 2018-09-10 DIAGNOSIS — Z01.818 PREOP GENERAL PHYSICAL EXAM: Primary | ICD-10-CM

## 2018-09-10 DIAGNOSIS — F10.21 HISTORY OF ALCOHOL DEPENDENCE (H): ICD-10-CM

## 2018-09-10 DIAGNOSIS — Z01.818 PREOP GENERAL PHYSICAL EXAM: ICD-10-CM

## 2018-09-10 DIAGNOSIS — R82.90 NONSPECIFIC FINDING ON EXAMINATION OF URINE: ICD-10-CM

## 2018-09-10 DIAGNOSIS — M48.061 SPINAL STENOSIS OF LUMBAR REGION WITHOUT NEUROGENIC CLAUDICATION: ICD-10-CM

## 2018-09-10 DIAGNOSIS — N30.00 ACUTE CYSTITIS WITHOUT HEMATURIA: ICD-10-CM

## 2018-09-10 DIAGNOSIS — G47.33 OSA (OBSTRUCTIVE SLEEP APNEA): ICD-10-CM

## 2018-09-10 LAB
ALBUMIN SERPL-MCNC: 3.6 G/DL (ref 3.4–5)
ALBUMIN UR-MCNC: NEGATIVE MG/DL
ALP SERPL-CCNC: 79 U/L (ref 40–150)
ALT SERPL W P-5'-P-CCNC: 22 U/L (ref 0–50)
ANION GAP SERPL CALCULATED.3IONS-SCNC: 6 MMOL/L (ref 3–14)
APPEARANCE UR: CLEAR
AST SERPL W P-5'-P-CCNC: 19 U/L (ref 0–45)
BACTERIA #/AREA URNS HPF: ABNORMAL /HPF
BASOPHILS # BLD AUTO: 0 10E9/L (ref 0–0.2)
BASOPHILS NFR BLD AUTO: 0.6 %
BILIRUB SERPL-MCNC: 0.3 MG/DL (ref 0.2–1.3)
BILIRUB UR QL STRIP: NEGATIVE
BUN SERPL-MCNC: 9 MG/DL (ref 7–30)
CALCIUM SERPL-MCNC: 8.5 MG/DL (ref 8.5–10.1)
CHLORIDE SERPL-SCNC: 107 MMOL/L (ref 94–109)
CO2 SERPL-SCNC: 27 MMOL/L (ref 20–32)
COLOR UR AUTO: YELLOW
CREAT SERPL-MCNC: 0.78 MG/DL (ref 0.52–1.04)
DIFFERENTIAL METHOD BLD: NORMAL
EOSINOPHIL # BLD AUTO: 0.1 10E9/L (ref 0–0.7)
EOSINOPHIL NFR BLD AUTO: 1.2 %
ERYTHROCYTE [DISTWIDTH] IN BLOOD BY AUTOMATED COUNT: 14.8 % (ref 10–15)
GFR SERPL CREATININE-BSD FRML MDRD: 76 ML/MIN/1.7M2
GLUCOSE SERPL-MCNC: 76 MG/DL (ref 70–99)
GLUCOSE UR STRIP-MCNC: NEGATIVE MG/DL
HCT VFR BLD AUTO: 41.7 % (ref 35–47)
HGB BLD-MCNC: 13.5 G/DL (ref 11.7–15.7)
HGB UR QL STRIP: ABNORMAL
KETONES UR STRIP-MCNC: NEGATIVE MG/DL
LEUKOCYTE ESTERASE UR QL STRIP: ABNORMAL
LYMPHOCYTES # BLD AUTO: 2.1 10E9/L (ref 0.8–5.3)
LYMPHOCYTES NFR BLD AUTO: 30.4 %
MCH RBC QN AUTO: 29.9 PG (ref 26.5–33)
MCHC RBC AUTO-ENTMCNC: 32.4 G/DL (ref 31.5–36.5)
MCV RBC AUTO: 93 FL (ref 78–100)
MONOCYTES # BLD AUTO: 0.4 10E9/L (ref 0–1.3)
MONOCYTES NFR BLD AUTO: 5.9 %
NEUTROPHILS # BLD AUTO: 4.3 10E9/L (ref 1.6–8.3)
NEUTROPHILS NFR BLD AUTO: 61.9 %
NITRATE UR QL: POSITIVE
PH UR STRIP: 7 PH (ref 5–7)
PLATELET # BLD AUTO: 244 10E9/L (ref 150–450)
POTASSIUM SERPL-SCNC: 3.9 MMOL/L (ref 3.4–5.3)
PROT SERPL-MCNC: 7.5 G/DL (ref 6.8–8.8)
RBC # BLD AUTO: 4.51 10E12/L (ref 3.8–5.2)
RBC #/AREA URNS AUTO: ABNORMAL /HPF
SODIUM SERPL-SCNC: 140 MMOL/L (ref 133–144)
SOURCE: ABNORMAL
SP GR UR STRIP: 1.01 (ref 1–1.03)
UROBILINOGEN UR STRIP-ACNC: 0.2 EU/DL (ref 0.2–1)
WBC # BLD AUTO: 6.9 10E9/L (ref 4–11)
WBC #/AREA URNS AUTO: ABNORMAL /HPF

## 2018-09-10 PROCEDURE — 81001 URINALYSIS AUTO W/SCOPE: CPT | Performed by: NURSE PRACTITIONER

## 2018-09-10 PROCEDURE — 80053 COMPREHEN METABOLIC PANEL: CPT | Performed by: NURSE PRACTITIONER

## 2018-09-10 PROCEDURE — 99215 OFFICE O/P EST HI 40 MIN: CPT | Performed by: NURSE PRACTITIONER

## 2018-09-10 PROCEDURE — 87088 URINE BACTERIA CULTURE: CPT | Performed by: NURSE PRACTITIONER

## 2018-09-10 PROCEDURE — 87086 URINE CULTURE/COLONY COUNT: CPT | Performed by: NURSE PRACTITIONER

## 2018-09-10 PROCEDURE — 71046 X-RAY EXAM CHEST 2 VIEWS: CPT | Mod: FY

## 2018-09-10 PROCEDURE — 36415 COLL VENOUS BLD VENIPUNCTURE: CPT | Performed by: NURSE PRACTITIONER

## 2018-09-10 PROCEDURE — 87186 SC STD MICRODIL/AGAR DIL: CPT | Performed by: NURSE PRACTITIONER

## 2018-09-10 PROCEDURE — 85025 COMPLETE CBC W/AUTO DIFF WBC: CPT | Performed by: NURSE PRACTITIONER

## 2018-09-10 RX ORDER — SULFAMETHOXAZOLE/TRIMETHOPRIM 800-160 MG
1 TABLET ORAL 2 TIMES DAILY
Qty: 6 TABLET | Refills: 0 | Status: SHIPPED | OUTPATIENT
Start: 2018-09-10 | End: 2019-03-01

## 2018-09-10 ASSESSMENT — ANXIETY QUESTIONNAIRES
6. BECOMING EASILY ANNOYED OR IRRITABLE: NEARLY EVERY DAY
7. FEELING AFRAID AS IF SOMETHING AWFUL MIGHT HAPPEN: MORE THAN HALF THE DAYS
1. FEELING NERVOUS, ANXIOUS, OR ON EDGE: MORE THAN HALF THE DAYS
2. NOT BEING ABLE TO STOP OR CONTROL WORRYING: MORE THAN HALF THE DAYS
4. TROUBLE RELAXING: NEARLY EVERY DAY
GAD7 TOTAL SCORE: 16
3. WORRYING TOO MUCH ABOUT DIFFERENT THINGS: MORE THAN HALF THE DAYS
GAD7 TOTAL SCORE: 16
GAD7 TOTAL SCORE: 16
5. BEING SO RESTLESS THAT IT IS HARD TO SIT STILL: MORE THAN HALF THE DAYS
7. FEELING AFRAID AS IF SOMETHING AWFUL MIGHT HAPPEN: MORE THAN HALF THE DAYS

## 2018-09-10 ASSESSMENT — PATIENT HEALTH QUESTIONNAIRE - PHQ9
SUM OF ALL RESPONSES TO PHQ QUESTIONS 1-9: 6
10. IF YOU CHECKED OFF ANY PROBLEMS, HOW DIFFICULT HAVE THESE PROBLEMS MADE IT FOR YOU TO DO YOUR WORK, TAKE CARE OF THINGS AT HOME, OR GET ALONG WITH OTHER PEOPLE: SOMEWHAT DIFFICULT
SUM OF ALL RESPONSES TO PHQ QUESTIONS 1-9: 6

## 2018-09-10 NOTE — MR AVS SNAPSHOT
After Visit Summary   9/10/2018    Vesta Rodriguez    MRN: [id number]           Patient Information     Date Of Birth          1960        Visit Information        Provider Department      9/10/2018 11:00 AM Ana Paula Mccray NP [facility]        Today's Diagnoses     Preop general physical exam    -  1    Spinal stenosis of lumbar region without neurogenic claudication        Chronic obstructive pulmonary disease, unspecified COPD type (H)        CATALINA (obstructive sleep apnea)        Tobacco user        History of alcohol dependence (H)        Nonspecific finding on examination of urine        Acute cystitis without hematuria          Care Instructions    Urine showed possible infection   Bactrim twice a day for 3 days   Will culture urine     Cleared for surgery as long rest of labs are normal     Referral placed for sleep clinic is [facility]  Number highlighted below     Hold medication AM of surgery- and OK to resume after     No ibuprofen, naproxen or aspirin products until after surgery     Before Your Surgery      Call your surgeon if there is any change in your health. This includes signs of a cold or flu (such as a sore throat, runny nose, cough, rash or fever).    Do not smoke, drink alcohol or take over the counter medicine (unless your surgeon or primary care doctor tells you to) for the 24 hours before and after surgery.    If you take prescribed drugs: Follow your doctor s orders about which medicines to take and which to stop until after surgery.    Eating and drinking prior to surgery: follow the instructions from your surgeon    Take a shower or bath the night before surgery. Use the soap your surgeon gave you to gently clean your skin. If you do not have soap from your surgeon, use your regular soap. Do not shave or scrub the surgery site.  Wear clean pajamas and have clean sheets on your bed.           Follow-ups after your visit        Additional Services      SLEEP EVALUATION & MANAGEMENT REFERRAL - [facility]  [phone] (Age 2 and up)       Please be aware that coverage of these services is subject to the terms and limitations of your health insurance plan.  Call member services at your health plan with any benefit or coverage questions.      Please bring the following to your appointment:    >>   List of current medications   >>   This referral request   >>   Any documents/labs given to you for this referral                      Future tests that were ordered for you today     Open Future Orders        Priority Expected Expires Ordered    SLEEP EVALUATION & MANAGEMENT REFERRAL - [facility]  [phone] (Age 2 and up) Routine  9/10/2019 9/10/2018            Who to contact     If you have questions or need follow up information about today's clinic visit or your schedule please contact [facility] directly at [phone].  Normal or non-critical lab and imaging results will be communicated to you by MyChart, letter or phone within 4 business days after the clinic has received the results. If you do not hear from us within 7 days, please contact the clinic through [employer] or phone. If you have a critical or abnormal lab result, we will notify you by phone as soon as possible.  Submit refill requests through [employer] or call your pharmacy and they will forward the refill request to us. Please allow 3 business days for your refill to be completed.          Additional Information About Your Visit        MyChart Information     [employer] gives you secure access to your electronic health record. If you see a primary care provider, you can also send messages to your care team and make appointments. If you have questions, please call your primary care clinic.  If you do not have a primary care provider, please call [phone] and they will assist you.        Care EveryWhere ID     This is your Care  EveryWhere ID. This could be used by other organizations to access your [facility] medical records  [id number]        Your Vitals Were     Pulse Temperature Respirations Pulse Oximetry Breastfeeding? BMI (Body Mass Index)    86 99.2  F (37.3  C) 24 97% No 28.26 kg/m2       Blood Pressure from Last 3 Encounters:   09/10/18 112/62   05/24/18 96/59   05/09/18 102/64    Weight from Last 3 Encounters:   09/10/18 150 lb 12.8 oz (68.4 kg)   05/09/18 161 lb (73 kg)   03/09/18 162 lb 12.8 oz (73.8 kg)              We Performed the Following     CBC with platelets differential     Comprehensive metabolic panel     UA reflex to Microscopic and Culture     Urine Culture Aerobic Bacterial     Urine Microscopic          Today's Medication Changes          These changes are accurate as of 9/10/18 12:10 PM.  If you have any questions, ask your nurse or doctor.               Start taking these medicines.        Dose/Directions    sulfamethoxazole-trimethoprim 800-160 MG per tablet   Commonly known as:  BACTRIM DS/SEPTRA DS   Used for:  Acute cystitis without hematuria   Started by:  Ana Paula Mccray NP        Dose:  1 tablet   Take 1 tablet by mouth 2 times daily for 3 days   Quantity:  6 tablet   Refills:  0            Where to get your medicines      These medications were sent to [facility] #[id number] - [place], [place] - [address]  [address] [place] [address]     Phone:  [phone]     sulfamethoxazole-trimethoprim 800-160 MG per tablet                Primary Care Provider Office Phone # Fax #    Trinidad Drummond, [facility] [phone]       [address] [facility] [address]        Equal Access to Services     [facility]: Hadii lore cortes Sopanchito, waaxda luqadaha, qaybta kaalmada adeegyaanita, joshua pacheco. So [facility] [phone].    ATENCIÓN: Si habla español, tiene a moses disposición servicios gratuitos de asistencia lingüística. Llame al [phone].    We comply with  applicable federal civil rights laws and [place] laws. We do not discriminate on the basis of race, color, national origin, age, disability, sex, sexual orientation, or gender identity.            Thank you!     Thank you for choosing [facility]  for your care. Our goal is always to provide you with excellent care. Hearing back from our patients is one way we can continue to improve our services. Please take a few minutes to complete the written survey that you may receive in the mail after your visit with us. Thank you!             Your Updated Medication List - Protect others around you: Learn how to safely use, store and throw away your medicines at www.disposemymeds.org.          This list is accurate as of 9/10/18 12:10 PM.  Always use your most recent med list.                   Brand Name Dispense Instructions for use Diagnosis    albuterol 108 (90 Base) MCG/ACT inhaler    PROAIR HFA/PROVENTIL HFA/VENTOLIN HFA    1 Inhaler    Inhale 2 puffs into the lungs every 6 hours as needed for shortness of breath / dyspnea or wheezing    Chronic bronchitis, unspecified chronic bronchitis type (H)       alendronate 70 MG tablet    FOSAMAX     Take 70 mg by mouth every 7 days        buPROPion 150 MG 12 hr tablet    WELLBUTRIN SR    180 tablet    Take 1 tablet (150 mg) by mouth 2 times daily    Encounter for smoking cessation counseling       fluticasone-salmeterol 100-50 MCG/DOSE diskus inhaler    ADVAIR    2 Inhaler    Inhale 1 puff into the lungs 2 times daily    Chronic obstructive pulmonary disease, unspecified COPD type (H)       hydrOXYzine 25 MG tablet    ATARAX     Take 25-50 mg by mouth 3 times daily as needed for itching        LYRICA 75 MG capsule   Generic drug:  pregabalin      Take 75 mg by mouth 2 times daily        methimazole 5 MG tablet    TAPAZOLE    90 tablet    Take 1 tablet (5 mg) by mouth daily    Graves' disease       nicotine 7 MG/24HR 24 hr patch    EQ NICOTINE    30 patch     Place 1 patch onto the skin every 24 hours (May use 2 patches if needed per day)    Tobacco user       order for DME      Equipment being ordered: CPAP        pramipexole 1.5 MG tablet    MIRAPEX    180 tablet    Take 1 tablet (1.5 mg) by mouth 2 times daily    Restless leg syndrome       sulfamethoxazole-trimethoprim 800-160 MG per tablet    BACTRIM DS/SEPTRA DS    6 tablet    Take 1 tablet by mouth 2 times daily for 3 days    Acute cystitis without hematuria       vitamin D 70635 UNIT capsule    ERGOCALCIFEROL     Take 50,000 Units by mouth once a week

## 2018-09-10 NOTE — PROGRESS NOTES
[address] [address]  [phone]  Dept: [phone]    PRE-OP EVALUATION:  Today's date: 9/10/2018    Vesta Rodriguez (: 1960) presents for pre-operative evaluation assessment as requested by Dr. Alonso.  She requires evaluation and anesthesia risk assessment prior to undergoing surgery/procedure for treatment of spinal stenosis .    Fax number for surgical facility: [phone]  Primary Physician: Trinidad Drummond  Type of Anesthesia Anticipated: General    Patient has a Health Care Directive or Living Will:  YES     Preop Questions 9/10/2018   Who is doing your surgery? albert   What are you having done? spine surgery   Date of Surgery/Procedure: 2018   Facility or Hospital where procedure/surgery will be performed: -   1.  Do you have a history of Heart attack, stroke, stent, coronary bypass surgery, or other heart surgery? No   2.  Do you ever have any pain or discomfort in your chest? No   3.  Do you have a history of  Heart Failure? No   4.   Are you troubled by shortness of breath when:  walking on a level surface, or up a slight hill, or at night? No   5.  Do you currently have a cold, bronchitis or other respiratory infection? No   6.  Do you have a cough, shortness of breath, or wheezing? No   7.  Do you sometimes get pains in the calves of your legs when you walk? No   8. Do you or anyone in your family have previous history of blood clots? No   9.  Do you or does anyone in your family have a serious bleeding problem such as prolonged bleeding following surgeries or cuts? No   10. Have you ever had problems with anemia or been told to take iron pills? No   11. Have you had any abnormal blood loss such as black, tarry or bloody stools, or abnormal vaginal bleeding? No   12. Have you ever had a blood transfusion? YES - GI bleed in     13. Have you or any of your relatives ever had problems with anesthesia? No   14. Do  you have sleep apnea, excessive snoring or daytime drowsiness? YES - does not use a CPAP- hard time getting mask to seal or fit well    15. Do you have any prosthetic heart valves? No   16. Do you have prosthetic joints? No   17. Is there any chance that you may be pregnant? No         HPI:     HPI related to upcoming procedure: has been dealing with chronic back pain has had 3 surgeries first was in 2009. She is being treated by Dr. Alonso with [facility] and will be undergoing a lumbar fusion on 9/19/18. She is here today for pre operative clearance.She takes lyrica for her chronic low back pain       COPD - Patient has a longstanding history of moderate-severe COPD . Patient has been doing well overall noting NO SYMPTOMS and continues on medication regimen consisting of advair and as needed albuterol without adverse reactions or side effects.  She is a smoker- tells me that her plan is to quit after this surgery. She understands that smoking delays wound healing and also increases risk of pulmonary complications especially in light of her COPD.                                                                                                      .  DEPRESSION - Patient has a long history of Depression of moderate severity requiring medication for control with recent symptoms being stable..Current symptoms of depression include none. She is on wellbutrin.                                                                                                                                                                          .  SLEEP PROBLEM - Patient has a longstanding history of restless leg syndrome in which she takes mirapex.. Patient has tried multiple prescriptions and OTC medications with limited success. She has a history of sleep apnea but hs been unable to tolerate the mask and find one that properly seals.                                                                                                                        .    MEDICAL HISTORY:     Patient Active Problem List    Diagnosis Date Noted     CATALINA (obstructive sleep apnea) 05/09/2018     Priority: Medium     Sleep apnea 05/09/2018     Priority: Medium     Overview:   does not use CPAP due to insomnia       Spondylolisthesis of lumbar region 05/09/2018     Priority: Medium     Colitis 02/26/2018     Priority: Medium     S/P lumbar fusion 06/09/2017     Priority: Medium     Lumbar stenosis 05/30/2017     Priority: Medium     Advance Care Planning 03/08/2017     Priority: Medium     Advance Care Planning 7/5/2017: Recommend Code Status in chart and patient's Advance Care Planning documents be reviewed to ensure alignment with patient's current wishes. Added by Mary Kay Forman MSW Advance Care Planning Liaison  Advance Care Planning 3/8/2017: ACP Review of Chart / Resources Provided:  Reviewed chart for advance care plan.  Vesta Rodriguez has been provided information and resources to begin or update their advance care plan.  Added by Vesta Bradley       Major depressive disorder, recurrent episode, moderate (H) 10/24/2016     Priority: Medium     S/P cholecystectomy 08/04/2016     Priority: Medium     Graves disease 01/25/2016     Priority: Medium     Alcoholism (H) 11/19/2014     Priority: Medium     Sober since 2014.         H/O vertebroplasty 08/12/2013     Priority: Medium     Thyroid nodule; on outsie dc,due for f/u 5/13 04/19/2013     Priority: Medium     ultrasound may , 2013  Due for followup          Compression fracture 01/16/2013     Priority: Medium     GERD (gastroesophageal reflux disease) 01/16/2013     Priority: Medium     H/O of right ureteral stone 10/05/2012     Priority: Medium     8/1612         Chronic cholecystitis 09/04/2012     Priority: Medium     H/O: hysterectomy 08/19/2012     Priority: Medium     Tobacco user 08/28/2011     Priority: Medium     Family history of breast cancer in mother 08/28/2011     Priority: Medium     Vitamin  D deficiency 08/28/2011     Priority: Medium     CARDIOVASCULAR SCREENING; LDL GOAL LESS THAN 130 10/31/2010     Priority: Medium     COPD (chronic obstructive pulmonary disease); spirometry 3/10 ; rare use of mdi 03/15/2010     Priority: Medium     Anxiety 09/26/2008     Priority: Medium     Disturbance in sleep behavior 10/08/2007     Priority: Medium     Has failed mult meds, - lunesta, ambien, klonopin  etoh treatment 2013  Not candidate for klonopin      Problem list name updated by automated process. Provider to review       Displacement of cervical intervertebral disc without myelopathy 10/08/2007     Priority: Medium     Cervical fusion. [facility].     2004       Restless legs syndrome (RLS) 07/30/2007     Priority: Medium     On mirapex, unable to go off     0.5 mg twice a day , for rls        Displacement of lumbar intervertebral disc without myelopathy 02/20/2006     Priority: Medium     2 disc fusion        Brachial neuritis or radiculitis 07/06/2005     Priority: Medium     Problem list name updated by automated process. Provider to review       Osteoporosis 07/06/2005     Priority: Medium     Hip fracture after bone graft.     forteo 3/09 - until summer 2010  Problem list name updated by automated process. Provider to review        Past Medical History:   Diagnosis Date     Anemia      Anemia due to blood loss, acute 10/21/2012     Anxiety      Back pain      Chronic cholecystitis      Degeneration of cervical intervertebral disc     04     Degeneration of lumbar or lumbosacral intervertebral disc      Major depression in remission (H)      Osteoporosis      Right ureteral stone      RLS (restless legs syndrome)      Sleep disturbance      UTI (urinary tract infection)     RECENT- ON ANTIBIOTIC, STILL BACTERIA IN URINE     Vitamin D deficiency      Past Surgical History:   Procedure Laterality Date     FUSION CERVICAL ANTERIOR TWO LEVELS      C 5-7     FUSION LUMBAR ANTERIOR THREE+ LEVELS      L3-  SI     HYSTERECTOMY, DEYSI      no oophorectomy     INJECT EPIDURAL TRANSFORAMINAL Bilateral 3/9/2017    Procedure: INJECT EPIDURAL TRANSFORAMINAL;  Surgeon: Pérez Valle MD;  Location: [facility] OR     INJECT EPIDURAL TRANSFORAMINAL N/A 2018    Procedure: INJECT EPIDURAL TRANSFORAMINAL;  transforaminal epidural steroid injection, thoracic 12 - lumbar 1;  Surgeon: Pérez Valle MD;  Location: [facility] OR     LAPAROSCOPIC CHOLECYSTECTOMY  2012    Procedure: LAPAROSCOPIC CHOLECYSTECTOMY;  Laparoscopic vs Open Cholecystectomy;  Surgeon: Esperanza Quinn MD;  Location: [facility]     LASER HOLMIUM LITHOTRIPSY URETER(S), INSERT STENT, COMBINED  2012    Procedure: COMBINED CYSTOSCOPY, URETEROSCOPY, LASER HOLMIUM LITHOTRIPSY URETER(S), INSERT STENT;  Cystoscopy, Right Ureteroscopy Holmium Laser Lithotripsy , right ureteral stent placement *Latex Safe*;  Surgeon: Joshua Rm MD;  Location: [facility] OR     SURGICAL HISTORY OF -        section     SURGICAL HISTORY OF -       Tubal ligation     SURGICAL HISTORY OF -       Post hip fracture     SURGICAL HISTORY OF -       hardware removal, right iliac crest      VERTEBROPLASTY       Current Outpatient Prescriptions   Medication Sig Dispense Refill     albuterol (PROAIR HFA/PROVENTIL HFA/VENTOLIN HFA) 108 (90 BASE) MCG/ACT Inhaler Inhale 2 puffs into the lungs every 6 hours as needed for shortness of breath / dyspnea or wheezing 1 Inhaler 1     alendronate (FOSAMAX) 70 MG tablet Take 70 mg by mouth every 7 days       buPROPion (WELLBUTRIN SR) 150 MG 12 hr tablet Take 1 tablet (150 mg) by mouth 2 times daily 180 tablet 3     fluticasone-salmeterol (ADVAIR) 100-50 MCG/DOSE diskus inhaler Inhale 1 puff into the lungs 2 times daily 2 Inhaler 10     hydrOXYzine (ATARAX) 25 MG tablet Take 25-50 mg by mouth 3 times daily as needed for itching       methimazole (TAPAZOLE) 5 MG tablet Take 1 tablet (5 mg) by mouth daily 90 tablet 3     nicotine  (EQ NICOTINE) 7 MG/24HR 24 hr patch Place 1 patch onto the skin every 24 hours (May use 2 patches if needed per day) 30 patch 6     order for DME Equipment being ordered: CPAP       pramipexole (MIRAPEX) 1.5 MG tablet Take 1 tablet (1.5 mg) by mouth 2 times daily 180 tablet 0     pregabalin (LYRICA) 75 MG capsule Take 75 mg by mouth 2 times daily       sulfamethoxazole-trimethoprim (BACTRIM DS/SEPTRA DS) 800-160 MG per tablet Take 1 tablet by mouth 2 times daily for 3 days 6 tablet 0     vitamin D (ERGOCALCIFEROL) 82249 UNIT capsule Take 50,000 Units by mouth once a week       OTC products: None, except as noted above    Allergies   Allergen Reactions     Ambien [Zolpidem Tartrate]      Mental status changes     Asa [Aspirin]      Accidental overdose.     Celexa [Citalopram Hydrobromide]      review with pt., i cant' find side effect in chart. 2/9/06 Sylvie Mederos MD       Doxychel [Doxycycline Hyclate]      Gabapentin      Rectalurgency      Tape [Adhesive Tape]      Zoloft      Wt gain       Latex Allergy: NO    Social History   Substance Use Topics     Smoking status: Current Some Day Smoker     Packs/day: 0.50     Types: Cigarettes     Last attempt to quit: 5/30/2017     Smokeless tobacco: Never Used     Alcohol use No     History   Drug Use No       REVIEW OF SYSTEMS:   Constitutional, neuro, ENT, endocrine, pulmonary, cardiac, gastrointestinal, genitourinary, musculoskeletal, integument and psychiatric systems are negative, except as otherwise noted.    EXAM:   /62  Pulse 86  Temp 99.2  F (37.3  C)  Resp 24  Wt 150 lb 12.8 oz (68.4 kg)  SpO2 97%  Breastfeeding? No  BMI 28.26 kg/m2    GENERAL APPEARANCE: healthy, alert and no distress     EYES: EOMI,  PERRL     HENT: ear canals and TM's normal and nose and mouth without ulcers or lesions     NECK: no adenopathy, no asymmetry, masses, or scars and thyroid normal to palpation     RESP: lungs clear to auscultation - no rales, rhonchi or wheezes      CV: regular rates and rhythm, normal S1 S2, no S3 or S4 and no murmur, click or rub     ABDOMEN:  soft, nontender, no HSM or masses and bowel sounds normal     MS: extremities normal- no gross deformities noted, no evidence of inflammation in joints, FROM in all extremities.     SKIN: no suspicious lesions or rashes     NEURO: Normal strength and tone, sensory exam grossly normal, mentation intact and speech normal     PSYCH: mentation appears normal. and affect normal/bright     LYMPHATICS: No cervical adenopathy    DIAGNOSTICS:   EKG: reviewed EKG from March appears normal, NSR, normal axis, normal intervals, no acute ST/T changes c/w ischemia, no LVH by voltage criteria, unchanged from previous tracings. (patient declined having an EKG done today)  CBC RESULTS:   Recent Labs   Lab Test  09/10/18   1125   WBC  6.9   RBC  4.51   HGB  13.5   HCT  41.7   MCV  93   MCH  29.9   MCHC  32.4   RDW  14.8   PLT  244     Last Comprehensive Metabolic Panel:  Sodium   Date Value Ref Range Status   09/10/2018 140 133 - 144 mmol/L Final     Potassium   Date Value Ref Range Status   09/10/2018 3.9 3.4 - 5.3 mmol/L Final     Chloride   Date Value Ref Range Status   09/10/2018 107 94 - 109 mmol/L Final     Carbon Dioxide   Date Value Ref Range Status   09/10/2018 27 20 - 32 mmol/L Final     Anion Gap   Date Value Ref Range Status   09/10/2018 6 3 - 14 mmol/L Final     Glucose   Date Value Ref Range Status   09/10/2018 76 70 - 99 mg/dL Final     Comment:     Fasting specimen     Urea Nitrogen   Date Value Ref Range Status   09/10/2018 9 7 - 30 mg/dL Final     Creatinine   Date Value Ref Range Status   09/10/2018 0.78 0.52 - 1.04 mg/dL Final     GFR Estimate   Date Value Ref Range Status   09/10/2018 76 >60 mL/min/1.7m2 Final     Comment:     Non  GFR Calc     Calcium   Date Value Ref Range Status   09/10/2018 8.5 8.5 - 10.1 mg/dL Final   UA RESULTS:  Recent Labs   Lab Test  09/10/18   1141   COLOR  Yellow    APPEARANCE  Clear   URINEGLC  Negative   URINEBILI  Negative   URINEKETONE  Negative   SG  1.015   UBLD  Trace*   URINEPH  7.0   PROTEIN  Negative   UROBILINOGEN  0.2   NITRITE  Positive*   LEUKEST  Small*   RBCU  2-5*   WBCU  10-25*        Specimen Description 09/10/2018 11:25    Midstream Urine   Culture Micro (Abnormal) 09/10/2018 11:25    50,000 to 100,000 colonies/mL   Escherichia coli      Culture & Susceptibility   ESCHERICHIA COLI   Antibiotic Interpretation Sensitivity Unit Method Status   AMPICILLIN Sensitive <=2 ug/mL LUNA Final   AMPICILLIN/SULBACTAM Sensitive <=2 ug/mL LUNA Final   CEFAZOLIN Sensitive <=4 ug/mL LUNA Final   Comment: Cefazolin LUNA breakpoints are for the treatment of uncomplicated urinary tract   infections.  For the treatment of systemic infections, please contact the   laboratory for additional testing.   CEFEPIME Sensitive <=1 ug/mL LUNA Final   CEFOXITIN Sensitive <=4 ug/mL LUNA Final   CEFTAZIDIME Sensitive <=1 ug/mL LUNA Final   CEFTRIAXONE Sensitive <=1 ug/mL LUNA Final   CIPROFLOXACIN Sensitive <=0.25 ug/mL LUNA Final   GENTAMICIN Sensitive <=1 ug/mL LUNA Final   LEVOFLOXACIN Sensitive <=0.12 ug/mL LUNA Final   NITROFURANTOIN Sensitive <=16 ug/mL LUNA Final   Piperacillin/Tazo Sensitive <=4 ug/mL LUNA Final   TOBRAMYCIN Sensitive <=1 ug/mL LUNA Final   Trimethoprim/Sulfa Sensitive <=1/19 ug/mL LUNA Final        CHEST TWO VIEWS  9/10/2018 11:34 AM      HISTORY:  Preop general physical exam. Spinal stenosis of lumbar  region without neurogenic claudication.     COMPARISON: Chest x-rays dated 5/8/2017.     FINDINGS:  The lungs are mildly hyperaerated but are otherwise grossly  clear. Chronic healed bilateral rib fractures are again noted. Postop  changes status post anterior inferior cervical spinal fusion are  noted. There are postop changes in the visualized portions of the  lumbar spine including lateral and posterior fixation hardware. This  was not definitely seen on the prior  chest x-ray. Probable  vertebroplasty at L1 is again noted.         IMPRESSION:  1. Postop changes of the spine as described.  2. Hyperaeration is again noted.  3. No other evidence of acute cardiopulmonary disease is seen.     DENISE OJEDA MD    IMPRESSION:   Reason for surgery/procedure: chronic back pain  Diagnosis/reason for consult: pre op clearance    The proposed surgical procedure is considered INTERMEDIATE risk.    REVISED CARDIAC RISK INDEX  The patient has the following serious cardiovascular risks for perioperative complications such as (MI, PE, VFib and 3  AV Block):  No serious cardiac risks  INTERPRETATION: 0 risks: Class I (very low risk - 0.4% complication rate)    The patient has the following additional risks for perioperative complications:  Smoker  Untreated sleep apnea      ICD-10-CM    1. Preop general physical exam Z01.818 CBC with platelets differential     UA reflex to Microscopic and Culture     XR Chest 2 Views     Comprehensive metabolic panel     Urine Microscopic     CANCELED: EKG 12-lead complete w/read - Clinics   2. Spinal stenosis of lumbar region without neurogenic claudication M48.061 CBC with platelets differential     UA reflex to Microscopic and Culture     XR Chest 2 Views     Comprehensive metabolic panel     CANCELED: EKG 12-lead complete w/read - Clinics   3. Chronic obstructive pulmonary disease, unspecified COPD type (H) J44.9    4. CATALINA (obstructive sleep apnea) G47.33 SLEEP EVALUATION & MANAGEMENT REFERRAL - [facility]  [phone] (Age 2 and up)   5. Tobacco user Z72.0    6. History of alcohol dependence (H) F10.21    7. Nonspecific finding on examination of urine R82.90 Urine Culture Aerobic Bacterial   8. Acute cystitis without hematuria N30.00 sulfamethoxazole-trimethoprim (BACTRIM DS/SEPTRA DS) 800-160 MG per tablet       RECOMMENDATIONS:       Pulmonary Risk  Incentive spirometry post op  Advised smoking cessation.       Obstructive  Sleep Apnea (or suspected sleep apnea)  Patient is to bring their home CPAP with them on the day of surgery      --Patient is to take hold all scheduled medications on the day of surgery.    APPROVAL GIVEN to proceed with proposed procedure, without further diagnostic evaluation       Signed Electronically by: Ana Paula Mccray NP    Copy of this evaluation report is provided to requesting physician.    [place] Preop Guidelines    Revised Cardiac Risk Index

## 2018-09-10 NOTE — PATIENT INSTRUCTIONS
Urine showed possible infection   Bactrim twice a day for 3 days   Will culture urine     Cleared for surgery as long rest of labs are normal     Referral placed for sleep clinic is [facility]  Number highlighted below     Hold medication AM of surgery- and OK to resume after     No ibuprofen, naproxen or aspirin products until after surgery     Before Your Surgery      Call your surgeon if there is any change in your health. This includes signs of a cold or flu (such as a sore throat, runny nose, cough, rash or fever).    Do not smoke, drink alcohol or take over the counter medicine (unless your surgeon or primary care doctor tells you to) for the 24 hours before and after surgery.    If you take prescribed drugs: Follow your doctor s orders about which medicines to take and which to stop until after surgery.    Eating and drinking prior to surgery: follow the instructions from your surgeon    Take a shower or bath the night before surgery. Use the soap your surgeon gave you to gently clean your skin. If you do not have soap from your surgeon, use your regular soap. Do not shave or scrub the surgery site.  Wear clean pajamas and have clean sheets on your bed.

## 2018-09-10 NOTE — NURSING NOTE
"Chief Complaint   Patient presents with     Pre-Op Exam       Initial There were no vitals taken for this visit. Estimated body mass index is 30.17 kg/(m^2) as calculated from the following:    Height as of 5/9/18: 5' 1.25\" (1.556 m).    Weight as of 5/9/18: 161 lb (73 kg).      Health Maintenance that is potentially due pending provider review:  PHQ9    Possibly completing today per provider review.    "

## 2018-09-11 ASSESSMENT — PATIENT HEALTH QUESTIONNAIRE - PHQ9: SUM OF ALL RESPONSES TO PHQ QUESTIONS 1-9: 6

## 2018-09-11 ASSESSMENT — ANXIETY QUESTIONNAIRES: GAD7 TOTAL SCORE: 16

## 2018-09-12 LAB
BACTERIA SPEC CULT: ABNORMAL
SPECIMEN SOURCE: ABNORMAL

## 2018-09-14 ENCOUNTER — MYC MEDICAL ADVICE (OUTPATIENT)
Dept: FAMILY MEDICINE | Facility: CLINIC | Age: 58
End: 2018-09-14

## 2018-09-14 NOTE — TELEPHONE ENCOUNTER
Read by Vesta Rodriguez at 9/12/2018  2:49 PM   Urine culture grew out sensitive to bactrim. Finish full course of antibiotic.   Marian NP     09-10-18 pre- op, lumbar fusion scheduled 09-19-18.  Pt has completed abx, asymptomatic.  RN discussed with Marian, no need to recheck UA.  Pt informed via [employer].  SHAREE Thomas RN

## 2018-09-27 DIAGNOSIS — G25.81 RESTLESS LEG SYNDROME: ICD-10-CM

## 2018-09-27 RX ORDER — PRAMIPEXOLE DIHYDROCHLORIDE 1.5 MG/1
TABLET ORAL
Qty: 60 TABLET | Refills: 0 | Status: SHIPPED | OUTPATIENT
Start: 2018-09-27 | End: 2018-10-29

## 2018-09-27 NOTE — TELEPHONE ENCOUNTER
09-24-18 TCU admission following lumbar fusion-  09/24/2018 Nursing Home [facility]    [address]    [place] [address]    [phone]   Frances Mccray NP    [address]    [place] [address]    [phone] (Fax)   Transitional Care Visit (admit to SNF)     Spoke with Ana's pharm, states this was pt requested refill.  Will refill x1 month at this time.  SHAREE Thomas RN

## 2018-09-27 NOTE — TELEPHONE ENCOUNTER
"Requested Prescriptions   Pending Prescriptions Disp Refills     pramipexole (MIRAPEX) 1.5 MG tablet [Pharmacy Med Name: PRAMIPEXOLE DIHYDROCHLORID 1.5 TABS] 180 tablet 0     Sig: TAKE ONE TABLET BY MOUTH TWICE A DAY    Antiparkinson's Agents Protocol Passed    9/27/2018  1:03 AM       Passed - Blood pressure under 140/90 in past 12 months    BP Readings from Last 3 Encounters:   09/10/18 112/62   05/24/18 96/59   05/09/18 102/64                Passed - CBC on record in past 12 months    Recent Labs   Lab Test  09/10/18   1125   WBC  6.9   RBC  4.51   HGB  13.5   HCT  41.7   PLT  244       For [facility] ONLY: [id number] = WBC, [id number] = RBC         Passed - ALT on record in past 12 months        Recent Labs   Lab Test  09/10/18   1125   ALT  22            Passed - Serum Creatinine on file in past 12 months    Recent Labs   Lab Test  09/10/18   1125   CR  0.78            Passed - Patient is age 18 or older       Passed - No active pregnancy on record       Passed - No positive pregnancy test in the past 12 months       Passed - Recent (6 mo) or future (30 days) visit within the authorizing provider's specialty    Patient had office visit in the last 6 months or has a visit in the next 30 days with authorizing provider or within the authorizing provider's specialty.  See \"Patient Info\" tab in inbasket, or \"Choose Columns\" in Meds & Orders section of the refill encounter.            Last Written Prescription Date:  5/9/18  Last Fill Quantity: 180,  # refills: 0   Last office visit: 9/10/2018 with prescribing provider:     Future Office Visit:      "

## 2018-10-02 ENCOUNTER — TRANSFERRED RECORDS (OUTPATIENT)
Dept: HEALTH INFORMATION MANAGEMENT | Facility: CLINIC | Age: 58
End: 2018-10-02

## 2018-10-12 ENCOUNTER — OFFICE VISIT (OUTPATIENT)
Dept: FAMILY MEDICINE | Facility: CLINIC | Age: 58
End: 2018-10-12
Payer: MEDICARE

## 2018-10-12 ENCOUNTER — RADIANT APPOINTMENT (OUTPATIENT)
Dept: GENERAL RADIOLOGY | Facility: CLINIC | Age: 58
End: 2018-10-12
Attending: NURSE PRACTITIONER
Payer: MEDICARE

## 2018-10-12 VITALS
TEMPERATURE: 99.1 F | OXYGEN SATURATION: 97 % | DIASTOLIC BLOOD PRESSURE: 62 MMHG | WEIGHT: 158 LBS | HEART RATE: 77 BPM | SYSTOLIC BLOOD PRESSURE: 100 MMHG | BODY MASS INDEX: 29.61 KG/M2

## 2018-10-12 DIAGNOSIS — Z98.1 S/P LUMBAR FUSION: ICD-10-CM

## 2018-10-12 DIAGNOSIS — R07.89 CHEST WALL PAIN: Primary | ICD-10-CM

## 2018-10-12 DIAGNOSIS — R07.89 CHEST WALL PAIN: ICD-10-CM

## 2018-10-12 DIAGNOSIS — Z72.0 TOBACCO ABUSE: ICD-10-CM

## 2018-10-12 DIAGNOSIS — Z23 NEED FOR PROPHYLACTIC VACCINATION AND INOCULATION AGAINST INFLUENZA: ICD-10-CM

## 2018-10-12 PROCEDURE — 99214 OFFICE O/P EST MOD 30 MIN: CPT | Mod: 25 | Performed by: NURSE PRACTITIONER

## 2018-10-12 PROCEDURE — 71046 X-RAY EXAM CHEST 2 VIEWS: CPT | Mod: FY

## 2018-10-12 PROCEDURE — G0008 ADMIN INFLUENZA VIRUS VAC: HCPCS | Performed by: NURSE PRACTITIONER

## 2018-10-12 PROCEDURE — 90682 RIV4 VACC RECOMBINANT DNA IM: CPT | Performed by: NURSE PRACTITIONER

## 2018-10-12 RX ORDER — OXYCODONE HYDROCHLORIDE 5 MG/1
5-10 TABLET ORAL
COMMUNITY
Start: 2018-09-26 | End: 2019-03-01

## 2018-10-12 RX ORDER — NICOTINE 21 MG/24HR
1 PATCH, TRANSDERMAL 24 HOURS TRANSDERMAL
COMMUNITY
End: 2018-10-12

## 2018-10-12 NOTE — MR AVS SNAPSHOT
After Visit Summary   10/12/2018    Vesta Rodriguez    MRN: [id number]           Patient Information     Date Of Birth          1960        Visit Information        Provider Department      10/12/2018 9:40 AM Ana Paula Mccrya NP [facility]        Today's Diagnoses     Chest wall pain    -  1    Tobacco abuse          Care Instructions    Need to discuss your concerns with the back brace with DME/surgeons office     Would recommend discussing concerns of side effects with forteo with surgeon and see what he recommends as other options    Start 7 mcg patch for the smoking               Follow-ups after your visit        Follow-up notes from your care team     Return in about 4 weeks (around 11/9/2018) for Routine Visit.      Your next 10 appointments already scheduled     Nov 12, 2018 10:00 AM CST   SHORT with Ana Paula Mccray NP   [facility] ([facility])    [address] [place] [address] [id number] [phone]              Who to contact     If you have questions or need follow up information about today's clinic visit or your schedule please contact [facility] directly at [phone].  Normal or non-critical lab and imaging results will be communicated to you by Puncheyhart, letter or phone within 4 business days after the clinic has received the results. If you do not hear from us within 7 days, please contact the clinic through Puncheyhart or phone. If you have a critical or abnormal lab result, we will notify you by phone as soon as possible.  Submit refill requests through TM or call your pharmacy and they will forward the refill request to us. Please allow 3 business days for your refill to be completed.          Additional Information About Your Visit        MyChart Information     TM gives you secure access to your electronic health record. If you see a primary care provider, you can also send messages to your care  team and make appointments. If you have questions, please call your primary care clinic.  If you do not have a primary care provider, please call [phone] and they will assist you.        Care EveryWhere ID     This is your Care EveryWhere ID. This could be used by other organizations to access your [place] medical records  [id number]        Your Vitals Were     Pulse Temperature Pulse Oximetry BMI (Body Mass Index)          77 99.1  F (37.3  C) (Tympanic) 97% 29.61 kg/m2         Blood Pressure from Last 3 Encounters:   10/12/18 100/62   09/10/18 112/62   05/24/18 96/59    Weight from Last 3 Encounters:   10/12/18 158 lb (71.7 kg)   09/10/18 150 lb 12.8 oz (68.4 kg)   05/09/18 161 lb (73 kg)                 Today's Medication Changes          These changes are accurate as of 10/12/18 10:28 AM.  If you have any questions, ask your nurse or doctor.               These medicines have changed or have updated prescriptions.        Dose/Directions    * nicotine 7 MG/24HR 24 hr patch   Commonly known as:  EQ NICOTINE   This may have changed:    - Another medication with the same name was added. Make sure you understand how and when to take each.  - Another medication with the same name was removed. Continue taking this medication, and follow the directions you see here.   Used for:  Tobacco user   Changed by:  Ana Paula Mccray NP        Dose:  1 patch   Place 1 patch onto the skin every 24 hours (May use 2 patches if needed per day)   Quantity:  30 patch   Refills:  6       * nicotine 7 MG/24HR 24 hr patch   Commonly known as:  NICODERM CQ   This may have changed:  You were already taking a medication with the same name, and this prescription was added. Make sure you understand how and when to take each.   Used for:  Tobacco abuse   Replaces:  nicotine 14 MG/24HR 24 hr patch   Changed by:  Ana Paula Mccray NP        Dose:  1 patch   Place 1 patch onto the skin every 24 hours   Quantity:  30 patch   Refills:  1        * Notice:  This list has 2 medication(s) that are the same as other medications prescribed for you. Read the directions carefully, and ask your doctor or other care provider to review them with you.      Stop taking these medicines if you haven't already. Please contact your care team if you have questions.     nicotine 14 MG/24HR 24 hr patch   Commonly known as:  NICODERM CQ   Replaced by:  nicotine 7 MG/24HR 24 hr patch   You also have another medication with the same name that you need to continue taking as instructed.   Stopped by:  Ana Paula Mccray, FLAVIA                Where to get your medicines      These medications were sent to [id number] - [place], [place] - [address] [place] [address]     Phone:  [phone]     nicotine 7 MG/24HR 24 hr patch               Information about OPIOIDS     PRESCRIPTION OPIOIDS: WHAT YOU NEED TO KNOW   We gave you an opioid (narcotic) pain medicine. It is important to manage your pain, but opioids are not always the best choice. You should first try all the other options your care team gave you. Take this medicine for as short a time (and as few doses) as possible.    Some activities can increase your pain, such as bandage changes or therapy sessions. It may help to take your pain medicine 30 to 60 minutes before these activities. Reduce your stress by getting enough sleep, working on hobbies you enjoy and practicing relaxation or meditation. Talk to your care team about ways to manage your pain beyond prescription opioids.    These medicines have risks:    DO NOT drive when on new or higher doses of pain medicine. These medicines can affect your alertness and reaction times, and you could be arrested for driving under the influence (DUI). If you need to use opioids long-term, talk to your care team about driving.    DO NOT operate heavy machinery    DO NOT do any other dangerous activities while taking these medicines.    DO NOT drink any alcohol while  taking these medicines.     If the opioid prescribed includes acetaminophen, DO NOT take with any other medicines that contain acetaminophen. Read all labels carefully. Look for the word  acetaminophen  or  Tylenol.  Ask your pharmacist if you have questions or are unsure.    You can get addicted to pain medicines, especially if you have a history of addiction (chemical, alcohol or substance dependence). Talk to your care team about ways to reduce this risk.    All opioids tend to cause constipation. Drink plenty of water and eat foods that have a lot of fiber, such as fruits, vegetables, prune juice, apple juice and high-fiber cereal. Take a laxative (Miralax, milk of magnesia, Colace, Senna) if you don t move your bowels at least every other day. Other side effects include upset stomach, sleepiness, dizziness, throwing up, tolerance (needing more of the medicine to have the same effect), physical dependence and slowed breathing.    Store your pills in a secure place, locked if possible. We will not replace any lost or stolen medicine. If you don t finish your medicine, please throw away (dispose) as directed by your pharmacist. The [place] [employer]ution [employer] has more information about safe disposal: https://www.pca.[email or web address].[email or web address]/living-[email or web address]/[email or web address]-unwanted-medications         Primary Care Provider Office Phone # Fax #    Ana Paula Mccray [phone]       [facility] [address]        Equal Access to Services     HUBERT ZAVALA : Jerica cortes Sopanchito, waaxda luqadaha, qaybta kaaljoshua womack. So [facility] [phone].    ATENCIÓN: Si habla español, tiene a moses disposición servicios gratuitos de asistencia lingüística. Abbi duarte [phone].    We comply with applicable federal civil rights laws and [place] laws. We do not discriminate on the basis of race, color, national origin, age, disability, sex, sexual orientation, or  gender identity.            Thank you!     Thank you for choosing [facility]  for your care. Our goal is always to provide you with excellent care. Hearing back from our patients is one way we can continue to improve our services. Please take a few minutes to complete the written survey that you may receive in the mail after your visit with us. Thank you!             Your Updated Medication List - Protect others around you: Learn how to safely use, store and throw away your medicines at www.disposemymeds.org.          This list is accurate as of 10/12/18 10:28 AM.  Always use your most recent med list.                   Brand Name Dispense Instructions for use Diagnosis    albuterol 108 (90 Base) MCG/ACT inhaler    PROAIR HFA/PROVENTIL HFA/VENTOLIN HFA    1 Inhaler    Inhale 2 puffs into the lungs every 6 hours as needed for shortness of breath / dyspnea or wheezing    Chronic bronchitis, unspecified chronic bronchitis type (H)       alendronate 70 MG tablet    FOSAMAX     Take 70 mg by mouth every 7 days        buPROPion 150 MG 12 hr tablet    WELLBUTRIN SR    180 tablet    Take 1 tablet (150 mg) by mouth 2 times daily    Encounter for smoking cessation counseling       fluticasone-salmeterol 100-50 MCG/DOSE diskus inhaler    ADVAIR    2 Inhaler    Inhale 1 puff into the lungs 2 times daily    Chronic obstructive pulmonary disease, unspecified COPD type (H)       hydrOXYzine 25 MG tablet    ATARAX     Take 25-50 mg by mouth 3 times daily as needed for itching        LYRICA 75 MG capsule   Generic drug:  pregabalin      Take 75 mg by mouth 2 times daily        methimazole 5 MG tablet    TAPAZOLE    90 tablet    Take 1 tablet (5 mg) by mouth daily    Graves' disease       * nicotine 7 MG/24HR 24 hr patch    EQ NICOTINE    30 patch    Place 1 patch onto the skin every 24 hours (May use 2 patches if needed per day)    Tobacco user       * nicotine 7 MG/24HR 24 hr patch    NICODERM CQ    30 patch     Place 1 patch onto the skin every 24 hours    Tobacco abuse       order for DME      Equipment being ordered: CPAP        oxyCODONE IR 5 MG tablet    ROXICODONE     Take 5-10 mg by mouth        pramipexole 1.5 MG tablet    MIRAPEX    60 tablet    TAKE ONE TABLET BY MOUTH TWICE A DAY    Restless leg syndrome       vitamin D 75980 UNIT capsule    ERGOCALCIFEROL     Take 50,000 Units by mouth once a week        * Notice:  This list has 2 medication(s) that are the same as other medications prescribed for you. Read the directions carefully, and ask your doctor or other care provider to review them with you.

## 2018-10-12 NOTE — PROGRESS NOTES
SUBJECTIVE:   Vesta Rodriguez is a 57 year old female who presents to clinic today for the following health issues:      thoracic     Hospital Follow-up Visit:    Hospital/Nursing Home/IP Rehab Facility: [facility]  Date of Admission: 9/19/18  Date of Discharge: 9/22/18  Reason(s) for Admission:   FUSION POSTERIOR ANTERIOR T12-L2 with DECOMPRESSION HARDWARE REMOVAL L3    DECOMPRESSION     REMOVAL HARDWARE L3    Surgeon follow up is on Nov 29th            Problems taking medications regularly:  None       Medication changes since discharge: None       Problems adhering to non-medication therapy:  None    Summary of hospitalization:  [facility] discharge summary reviewed  CareEverywhere information obtained and reviewed  Diagnostic Tests/Treatments reviewed.  Follow up needed: none  Other Healthcare Providers Involved in Patient s Care:         None  Update since discharge: improved.     Post Discharge Medication Reconciliation: discharge medications reconciled, continue medications without change.  Plan of care communicated with patient     Coding guidelines for this visit:  Type of Medical   Decision Making Face-to-Face Visit       within 7 Days of discharge Face-to-Face Visit        within 14 days of discharge   Moderate Complexity [id number]   High Complexity [id number]              Having trouble with brace rubbing against back  Had follow up with surgeon I 2 weeks   Reports pain in left chest wall worse with deep breaths   Denies any cough or shortness of breath   Overall is doing well         Problem list and histories reviewed & adjusted, as indicated.  Additional history: as documented    Patient Active Problem List   Diagnosis     Brachial neuritis or radiculitis     Osteoporosis     Displacement of lumbar intervertebral disc without myelopathy     Restless legs syndrome (RLS)     Disturbance in sleep behavior     Displacement of cervical intervertebral disc without myelopathy     Anxiety      CARDIOVASCULAR SCREENING; LDL GOAL LESS THAN 130     Tobacco user     Family history of breast cancer in mother     Vitamin D deficiency     Chronic cholecystitis     H/O of right ureteral stone     Thyroid nodule; on outsie dc,due for f/u      H/O vertebroplasty     COPD (chronic obstructive pulmonary disease); spirometry 3/10 ; rare use of mdi     Alcoholism (H)     Graves disease     S/P cholecystectomy     Major depressive disorder, recurrent episode, moderate (H)     Advance Care Planning     Colitis     Compression fracture     GERD (gastroesophageal reflux disease)     H/O: hysterectomy     Lumbar stenosis     CATALINA (obstructive sleep apnea)     Sleep apnea     S/P lumbar fusion     Spondylolisthesis of lumbar region     Past Surgical History:   Procedure Laterality Date     FUSION CERVICAL ANTERIOR TWO LEVELS      C 5-7     FUSION LUMBAR ANTERIOR THREE+ LEVELS      L3- SI     HYSTERECTOMY, DEYSI      no oophorectomy     INJECT EPIDURAL TRANSFORAMINAL Bilateral 3/9/2017    Procedure: INJECT EPIDURAL TRANSFORAMINAL;  Surgeon: Pérez Valle MD;  Location: [facility] OR     INJECT EPIDURAL TRANSFORAMINAL N/A 2018    Procedure: INJECT EPIDURAL TRANSFORAMINAL;  transforaminal epidural steroid injection, thoracic 12 - lumbar 1;  Surgeon: Pérez Valle MD;  Location: [facility] OR     LAPAROSCOPIC CHOLECYSTECTOMY  2012    Procedure: LAPAROSCOPIC CHOLECYSTECTOMY;  Laparoscopic vs Open Cholecystectomy;  Surgeon: Esperanza Quinn MD;  Location: [facility]     LASER HOLMIUM LITHOTRIPSY URETER(S), INSERT STENT, COMBINED  2012    Procedure: COMBINED CYSTOSCOPY, URETEROSCOPY, LASER HOLMIUM LITHOTRIPSY URETER(S), INSERT STENT;  Cystoscopy, Right Ureteroscopy Holmium Laser Lithotripsy , right ureteral stent placement *Latex Safe*;  Surgeon: Joshua Rm MD;  Location: [facility] OR     SURGICAL HISTORY OF -        section     SURGICAL HISTORY OF -       Tubal ligation     SURGICAL HISTORY OF -        Post hip fracture     SURGICAL HISTORY OF -       hardware removal, right iliac crest      VERTEBROPLASTY  2013       Social History   Substance Use Topics     Smoking status: Former Smoker     Packs/day: 0.50     Types: Cigarettes     Quit date: 2018     Smokeless tobacco: Never Used     Alcohol use No     Family History   Problem Relation Age of Onset     HEART DISEASE Brother      Breast Cancer Mother       age 31     Alcohol/Drug Other            Reviewed and updated as needed this visit by clinical staff       Reviewed and updated as needed this visit by Provider         ROS:  Constitutional, HEENT, cardiovascular, pulmonary, gi and gu systems are negative, except as otherwise noted.    OBJECTIVE:                                                    /62  Pulse 77  Temp 99.1  F (37.3  C) (Tympanic)  Wt 158 lb (71.7 kg)  SpO2 97%  BMI 29.61 kg/m2  Body mass index is 29.61 kg/(m^2).  GENERAL APPEARANCE: healthy, alert and no distress  HENT: ear canals and TM's normal and nose and mouth without ulcers or lesions  RESP: lungs clear to auscultation - no rales, rhonchi or wheezes  CV: regular rates and rhythm, normal S1 S2, no S3 or S4 and no murmur, click or rub  ABDOMEN: soft, nontender, without hepatosplenomegaly or masses and bowel sounds normal  MS: extremities normal- no gross deformities noted  SKIN: no suspicious lesions or rashes    Diagnostic test results:  Diagnostic Test Results:CHEST TWO VIEWS  10/12/2018 10:17 AM      HISTORY:  Left-sided chest wall pain, status post thoracic fusion  2018. Chest wall pain.     COMPARISON: 9/10/2018.         IMPRESSION: Small left pleural effusion has developed during the  interval. Old healed bilateral rib fractures noted. No discrete  infiltrates are present. Previous cervical spine and lumbar surgical  procedures noted. Heart size and pulmonary vasculature are normal.     DOMI MORTENSEN MD     ASSESSMENT/PLAN:                                                       1. Chest wall pain    2. S/P lumbar fusion    3. Tobacco abuse    4. Need for prophylactic vaccination and inoculation against influenza        Doing well s/p back surgery   Chest xray checked to rule out pneumonia this was negative   Patient will follow up with surgeon as planned  I have recommended that she contact surgeon office with concerns regarding brace     Patient Instructions   Need to discuss your concerns with the back brace with DME/surgeons office     Would recommend discussing concerns of side effects with forteo with surgeon and see what he recommends as other options    Start 7 mcg patch for the smoking           Ana Paula Mccray NP  [facility]

## 2018-10-12 NOTE — NURSING NOTE
"Chief Complaint   Patient presents with     Surgical Followup     back fusion       Initial /62  Pulse 77  Temp 99.1  F (37.3  C) (Tympanic)  Wt 158 lb (71.7 kg)  SpO2 97%  BMI 29.61 kg/m2 Estimated body mass index is 29.61 kg/(m^2) as calculated from the following:    Height as of 5/9/18: 5' 1.25\" (1.556 m).    Weight as of this encounter: 158 lb (71.7 kg).    Patient presents to the clinic using No DME    Health Maintenance that is potentially due pending provider review:  Flu shot    Possibly completing today per provider review.    Is there anyone who you would like to be able to receive your results? No  If yes have patient fill out JASIEL      "

## 2018-10-12 NOTE — PATIENT INSTRUCTIONS
Need to discuss your concerns with the back brace with DME/surgeons office     Would recommend discussing concerns of side effects with forteo with surgeon and see what he recommends as other options    Start 7 mcg patch for the smoking

## 2018-10-29 DIAGNOSIS — G25.81 RESTLESS LEG SYNDROME: ICD-10-CM

## 2018-10-29 RX ORDER — PRAMIPEXOLE DIHYDROCHLORIDE 1.5 MG/1
TABLET ORAL
Qty: 60 TABLET | Refills: 5 | Status: SHIPPED | OUTPATIENT
Start: 2018-10-29 | End: 2019-05-03

## 2018-10-29 NOTE — TELEPHONE ENCOUNTER
"Requested Prescriptions   Pending Prescriptions Disp Refills     pramipexole (MIRAPEX) 1.5 MG tablet [Pharmacy Med Name: PRAMIPEXOLE DIHYDROCHLORID 1.5 TABS] 60 tablet 0     Sig: TAKE ONE TABLET BY MOUTH TWICE A DAY    Antiparkinson's Agents Protocol Passed    10/29/2018  1:03 AM       Passed - Blood pressure under 140/90 in past 12 months    BP Readings from Last 3 Encounters:   10/12/18 100/62   09/10/18 112/62   05/24/18 96/59                Passed - CBC on record in past 12 months    Recent Labs   Lab Test  09/10/18   1125   WBC  6.9   RBC  4.51   HGB  13.5   HCT  41.7   PLT  244                Passed - ALT on record in past 12 months        Recent Labs   Lab Test  09/10/18   1125   ALT  22            Passed - Serum Creatinine on file in past 12 months    Recent Labs   Lab Test  09/10/18   1125   CR  0.78            Passed - Patient is age 18 or older       Passed - No active pregnancy on record       Passed - No positive pregnancy test in the past 12 months       Passed - Recent (6 mo) or future (30 days) visit within the authorizing provider's specialty    Patient had office visit in the last 6 months or has a visit in the next 30 days with authorizing provider or within the authorizing provider's specialty.  See \"Patient Info\" tab in inbasket, or \"Choose Columns\" in Meds & Orders section of the refill encounter.              "

## 2018-11-26 ENCOUNTER — TRANSFERRED RECORDS (OUTPATIENT)
Dept: HEALTH INFORMATION MANAGEMENT | Facility: CLINIC | Age: 58
End: 2018-11-26

## 2019-01-23 ENCOUNTER — TRANSFERRED RECORDS (OUTPATIENT)
Dept: HEALTH INFORMATION MANAGEMENT | Facility: CLINIC | Age: 59
End: 2019-01-23

## 2019-03-01 ENCOUNTER — OFFICE VISIT (OUTPATIENT)
Dept: FAMILY MEDICINE | Facility: CLINIC | Age: 59
End: 2019-03-01
Payer: MEDICARE

## 2019-03-01 ENCOUNTER — ANCILLARY PROCEDURE (OUTPATIENT)
Dept: GENERAL RADIOLOGY | Facility: CLINIC | Age: 59
End: 2019-03-01
Attending: NURSE PRACTITIONER
Payer: MEDICARE

## 2019-03-01 VITALS
OXYGEN SATURATION: 97 % | SYSTOLIC BLOOD PRESSURE: 92 MMHG | RESPIRATION RATE: 18 BRPM | BODY MASS INDEX: 31.53 KG/M2 | DIASTOLIC BLOOD PRESSURE: 64 MMHG | HEART RATE: 74 BPM | WEIGHT: 167 LBS | TEMPERATURE: 97.6 F | HEIGHT: 61 IN

## 2019-03-01 DIAGNOSIS — M79.605 LEG PAIN, BILATERAL: ICD-10-CM

## 2019-03-01 DIAGNOSIS — E04.1 THYROID NODULE: ICD-10-CM

## 2019-03-01 DIAGNOSIS — M79.605 LEG PAIN, BILATERAL: Primary | ICD-10-CM

## 2019-03-01 DIAGNOSIS — M12.89 OTHER SPECIFIC ARTHROPATHIES, NOT ELSEWHERE CLASSIFIED, MULTIPLE SITES: ICD-10-CM

## 2019-03-01 DIAGNOSIS — R09.89 OTHER SPECIFIED SYMPTOMS AND SIGNS INVOLVING THE CIRCULATORY AND RESPIRATORY SYSTEMS: ICD-10-CM

## 2019-03-01 DIAGNOSIS — J42 CHRONIC BRONCHITIS, UNSPECIFIED CHRONIC BRONCHITIS TYPE (H): ICD-10-CM

## 2019-03-01 DIAGNOSIS — M79.604 LEG PAIN, BILATERAL: ICD-10-CM

## 2019-03-01 DIAGNOSIS — M79.604 LEG PAIN, BILATERAL: Primary | ICD-10-CM

## 2019-03-01 LAB
ALBUMIN SERPL-MCNC: 3.9 G/DL (ref 3.4–5)
ALP SERPL-CCNC: 104 U/L (ref 40–150)
ALT SERPL W P-5'-P-CCNC: 25 U/L (ref 0–50)
ANION GAP SERPL CALCULATED.3IONS-SCNC: 5 MMOL/L (ref 3–14)
AST SERPL W P-5'-P-CCNC: 16 U/L (ref 0–45)
BILIRUB SERPL-MCNC: 0.4 MG/DL (ref 0.2–1.3)
BUN SERPL-MCNC: 15 MG/DL (ref 7–30)
CALCIUM SERPL-MCNC: 9.1 MG/DL (ref 8.5–10.1)
CHLORIDE SERPL-SCNC: 104 MMOL/L (ref 94–109)
CO2 SERPL-SCNC: 30 MMOL/L (ref 20–32)
CREAT SERPL-MCNC: 0.79 MG/DL (ref 0.52–1.04)
CRP SERPL-MCNC: <2.9 MG/L (ref 0–8)
ERYTHROCYTE [DISTWIDTH] IN BLOOD BY AUTOMATED COUNT: 16.1 % (ref 10–15)
ERYTHROCYTE [SEDIMENTATION RATE] IN BLOOD BY WESTERGREN METHOD: 9 MM/H (ref 0–30)
FERRITIN SERPL-MCNC: 43 NG/ML (ref 8–252)
GFR SERPL CREATININE-BSD FRML MDRD: 83 ML/MIN/{1.73_M2}
GLUCOSE SERPL-MCNC: 86 MG/DL (ref 70–99)
HCT VFR BLD AUTO: 38.4 % (ref 35–47)
HGB BLD-MCNC: 12.3 G/DL (ref 11.7–15.7)
MAGNESIUM SERPL-MCNC: 2.1 MG/DL (ref 1.6–2.3)
MCH RBC QN AUTO: 28 PG (ref 26.5–33)
MCHC RBC AUTO-ENTMCNC: 32 G/DL (ref 31.5–36.5)
MCV RBC AUTO: 88 FL (ref 78–100)
PLATELET # BLD AUTO: 247 10E9/L (ref 150–450)
POTASSIUM SERPL-SCNC: 4.6 MMOL/L (ref 3.4–5.3)
PROT SERPL-MCNC: 7.5 G/DL (ref 6.8–8.8)
RBC # BLD AUTO: 4.39 10E12/L (ref 3.8–5.2)
SODIUM SERPL-SCNC: 139 MMOL/L (ref 133–144)
TSH SERPL DL<=0.005 MIU/L-ACNC: 1.08 MU/L (ref 0.4–4)
WBC # BLD AUTO: 5.5 10E9/L (ref 4–11)

## 2019-03-01 PROCEDURE — 85652 RBC SED RATE AUTOMATED: CPT | Performed by: NURSE PRACTITIONER

## 2019-03-01 PROCEDURE — 80053 COMPREHEN METABOLIC PANEL: CPT | Performed by: NURSE PRACTITIONER

## 2019-03-01 PROCEDURE — 85027 COMPLETE CBC AUTOMATED: CPT | Performed by: NURSE PRACTITIONER

## 2019-03-01 PROCEDURE — 82728 ASSAY OF FERRITIN: CPT | Performed by: NURSE PRACTITIONER

## 2019-03-01 PROCEDURE — 84443 ASSAY THYROID STIM HORMONE: CPT | Performed by: NURSE PRACTITIONER

## 2019-03-01 PROCEDURE — 83735 ASSAY OF MAGNESIUM: CPT | Performed by: NURSE PRACTITIONER

## 2019-03-01 PROCEDURE — 82306 VITAMIN D 25 HYDROXY: CPT | Performed by: NURSE PRACTITIONER

## 2019-03-01 PROCEDURE — 86140 C-REACTIVE PROTEIN: CPT | Performed by: NURSE PRACTITIONER

## 2019-03-01 PROCEDURE — 73590 X-RAY EXAM OF LOWER LEG: CPT | Mod: RT

## 2019-03-01 PROCEDURE — 36415 COLL VENOUS BLD VENIPUNCTURE: CPT | Performed by: NURSE PRACTITIONER

## 2019-03-01 PROCEDURE — 99214 OFFICE O/P EST MOD 30 MIN: CPT | Performed by: NURSE PRACTITIONER

## 2019-03-01 RX ORDER — NAPROXEN 500 MG/1
500 TABLET ORAL
COMMUNITY
Start: 2019-01-27 | End: 2019-07-26

## 2019-03-01 RX ORDER — ALBUTEROL SULFATE 90 UG/1
2 AEROSOL, METERED RESPIRATORY (INHALATION) EVERY 6 HOURS PRN
Qty: 1 INHALER | Refills: 3 | Status: SHIPPED | OUTPATIENT
Start: 2019-03-01 | End: 2019-11-25

## 2019-03-01 ASSESSMENT — MIFFLIN-ST. JEOR: SCORE: 1283.85

## 2019-03-01 NOTE — NURSING NOTE
"Chief Complaint   Patient presents with     Musculoskeletal Problem     Thyroid Problem       Initial BP 92/64 (Cuff Size: Adult Large)   Pulse 74   Temp 97.6  F (36.4  C) (Tympanic)   Resp 18   Ht 1.556 m (5' 1.25\")   Wt 75.8 kg (167 lb)   SpO2 97%   Breastfeeding? No   BMI 31.30 kg/m   Estimated body mass index is 31.3 kg/m  as calculated from the following:    Height as of this encounter: 1.556 m (5' 1.25\").    Weight as of this encounter: 75.8 kg (167 lb).    Patient presents to the clinic using No DME    Health Maintenance that is potentially due pending provider review:  NONE    n/a    Is there anyone who you would like to be able to receive your results? Not Applicable  If yes have patient fill out JASIEL    "

## 2019-03-01 NOTE — PATIENT INSTRUCTIONS
Xray was normal  Recommend checking blood flow in legs with an ultrasound     Labs today for thyroid and check for vitamin or metabolic cause of pain       Recommend getting ultrasound of thyroid

## 2019-03-01 NOTE — PROGRESS NOTES
SUBJECTIVE:   Vesta Rodriguez is a 58 year old female who presents to clinic today for the following health issues:      Musculoskeletal problem/pain      Duration: 3 weeks     Description  Location: both legs    Intensity:  moderate    Accompanying signs and symptoms: will get really bad pains in her lower legs- can go around the track only once and has to stop because of the pain. Burning pain that starts mid calf and works its way down into the top of her foot. Gets to a point where her foot is just flapping when she walks.     History  Previous similar problem: no   Previous evaluation:  [facility] told her it was shin splints- is in PT here and PT says its not shin splint     Precipitating or alleviating factors:  Trauma or overuse: no   Aggravating factors include: walking    Therapies tried and outcome: rest/inactivity, ice, massage, stretching and support wrap, icy hot, sakina socks, - nothing seems to help       Hypothyroidism Follow-up       Since last visit, patient describes the following symptoms: weight gain of 20 lbs, fatigue and hair loss    Was supposed to have an ultrasound a few years ago and never did- thinks she should do it now cause she has nodules and is having a hard time swallowing her food at times. No issues with liquids.     Wt Readings from Last 10 Encounters:   03/01/19 75.8 kg (167 lb)   10/12/18 71.7 kg (158 lb)   09/10/18 68.4 kg (150 lb 12.8 oz)   05/09/18 73 kg (161 lb)   03/09/18 73.8 kg (162 lb 12.8 oz)   02/07/18 77.1 kg (170 lb)   10/18/17 75 kg (165 lb 6.4 oz)   09/19/17 75.3 kg (166 lb)   07/25/17 71.8 kg (158 lb 6.4 oz)   06/26/17 73.9 kg (163 lb)       Problem list and histories reviewed & adjusted, as indicated.  Additional history: as documented    Patient Active Problem List   Diagnosis     Brachial neuritis or radiculitis     Osteoporosis     Displacement of lumbar intervertebral disc without myelopathy     Restless legs syndrome (RLS)     Disturbance in sleep  behavior     Displacement of cervical intervertebral disc without myelopathy     Anxiety     CARDIOVASCULAR SCREENING; LDL GOAL LESS THAN 130     Tobacco user     Family history of breast cancer in mother     Vitamin D deficiency     Chronic cholecystitis     H/O of right ureteral stone     Thyroid nodule; on outsie dc,due for f/u 5/13     H/O vertebroplasty     COPD (chronic obstructive pulmonary disease); spirometry 3/10 ; rare use of mdi     Alcoholism (H)     Graves disease     S/P cholecystectomy     Major depressive disorder, recurrent episode, moderate (H)     Advance Care Planning     Colitis     Compression fracture     GERD (gastroesophageal reflux disease)     H/O: hysterectomy     Lumbar stenosis     CATALINA (obstructive sleep apnea)     Sleep apnea     S/P lumbar fusion     Spondylolisthesis of lumbar region     Past Surgical History:   Procedure Laterality Date     FUSION CERVICAL ANTERIOR TWO LEVELS      C 5-7     FUSION LUMBAR ANTERIOR THREE+ LEVELS      L3- SI     HYSTERECTOMY, DEYSI  1993    no oophorectomy     INJECT EPIDURAL TRANSFORAMINAL Bilateral 3/9/2017    Procedure: INJECT EPIDURAL TRANSFORAMINAL;  Surgeon: Pérez Valle MD;  [facility]     INJECT EPIDURAL TRANSFORAMINAL N/A 5/24/2018    Procedure: INJECT EPIDURAL TRANSFORAMINAL;  transforaminal epidural steroid injection, thoracic 12 - lumbar 1;  Surgeon: Pérez Valle MD;  [facility]     LAPAROSCOPIC CHOLECYSTECTOMY  9/12/2012    Procedure: LAPAROSCOPIC CHOLECYSTECTOMY;  Laparoscopic vs Open Cholecystectomy;  Surgeon: Esperanza Quinn MD;  Location: [facility]     LASER HOLMIUM LITHOTRIPSY URETER(S), INSERT STENT, COMBINED  11/2/2012    Procedure: COMBINED CYSTOSCOPY, URETEROSCOPY, LASER HOLMIUM LITHOTRIPSY URETER(S), INSERT STENT;  Cystoscopy, Right Ureteroscopy Holmium Laser Lithotripsy , right ureteral stent placement *Latex Safe*;  Surgeon: Joshua Rm MD;  [facility]     SURGICAL HISTORY OF -        " section     SURGICAL HISTORY OF -       Tubal ligation     SURGICAL HISTORY OF -       Post hip fracture     SURGICAL HISTORY OF -       hardware removal, right iliac crest      VERTEBROPLASTY         Social History     Tobacco Use     Smoking status: Former Smoker     Packs/day: 0.50     Types: Cigarettes     Last attempt to quit: 2018     Years since quittin.4     Smokeless tobacco: Never Used   Substance Use Topics     Alcohol use: No     Alcohol/week: 0.0 oz     Family History   Problem Relation Age of Onset     Heart Disease Brother      Breast Cancer Mother          age 31     Alcohol/Drug Other            Reviewed and updated as needed this visit by clinical staff       Reviewed and updated as needed this visit by Provider         ROS:  Constitutional, HEENT, cardiovascular, pulmonary, gi and gu systems are negative, except as otherwise noted.    OBJECTIVE:                                                    BP 92/64 (Cuff Size: Adult Large)   Pulse 74   Temp 97.6  F (36.4  C) (Tympanic)   Resp 18   Ht 1.556 m (5' 1.25\")   Wt 75.8 kg (167 lb)   SpO2 97%   Breastfeeding? No   BMI 31.30 kg/m    Body mass index is 31.3 kg/m .  GENERAL APPEARANCE: healthy, alert and no distress  RESP: lungs clear to auscultation - no rales, rhonchi or wheezes  CV: regular rates and rhythm, normal S1 S2, no S3 or S4 and no murmur, click or rub  ABDOMEN: soft, nontender, without hepatosplenomegaly or masses and bowel sounds normal  MS: extremities normal- no gross deformities noted  SKIN: no suspicious lesions or rashes  PSYCH: mentation appears normal and affect normal/bright    Diagnostic test results:  Diagnostic Test Results:  Results for orders placed or performed in visit on 19   TSH with free T4 reflex   Result Value Ref Range    TSH 1.08 0.40 - 4.00 mU/L   Erythrocyte sedimentation rate auto   Result Value Ref Range    Sed Rate 9 0 - 30 mm/h   CRP inflammation   Result Value Ref " Range    CRP Inflammation <2.9 0.0 - 8.0 mg/L   Comprehensive metabolic panel   Result Value Ref Range    Sodium 139 133 - 144 mmol/L    Potassium 4.6 3.4 - 5.3 mmol/L    Chloride 104 94 - 109 mmol/L    Carbon Dioxide 30 20 - 32 mmol/L    Anion Gap 5 3 - 14 mmol/L    Glucose 86 70 - 99 mg/dL    Urea Nitrogen 15 7 - 30 mg/dL    Creatinine 0.79 0.52 - 1.04 mg/dL    GFR Estimate 83 >60 mL/min/[1.73_m2]    GFR Estimate If Black >90 >60 mL/min/[1.73_m2]    Calcium 9.1 8.5 - 10.1 mg/dL    Bilirubin Total 0.4 0.2 - 1.3 mg/dL    Albumin 3.9 3.4 - 5.0 g/dL    Protein Total 7.5 6.8 - 8.8 g/dL    Alkaline Phosphatase 104 40 - 150 U/L    ALT 25 0 - 50 U/L    AST 16 0 - 45 U/L   CBC with platelets   Result Value Ref Range    WBC 5.5 4.0 - 11.0 10e9/L    RBC Count 4.39 3.8 - 5.2 10e12/L    Hemoglobin 12.3 11.7 - 15.7 g/dL    Hematocrit 38.4 35.0 - 47.0 %    MCV 88 78 - 100 fl    MCH 28.0 26.5 - 33.0 pg    MCHC 32.0 31.5 - 36.5 g/dL    RDW 16.1 (H) 10.0 - 15.0 %    Platelet Count 247 150 - 450 10e9/L   Ferritin   Result Value Ref Range    Ferritin 43 8 - 252 ng/mL   Magnesium   Result Value Ref Range    Magnesium 2.1 1.6 - 2.3 mg/dL        TIBIA AND FIBULA BILATERAL TWO VIEW  3/1/2019 11:56 AM      HISTORY: Leg pain, bilateral.     COMPARISON: None.                                                                      IMPRESSION: Proximal and distal tibial and fibular articulations  intact. No acute fracture.     MAGNOLIA TURCIOS MD  ASSESSMENT/PLAN:                                                    1. Leg pain, bilateral  2. Other specified symptoms and signs involving the circulatory and respiratory systems   Lab work up and xrays within normal range   ?pain related to claudication will check CAMILLE as next test     - XR Tibia & Fibula Bilateral 2 Views; Future  - Erythrocyte sedimentation rate auto  - CRP inflammation  - Comprehensive metabolic panel  - CBC with platelets  - Ferritin  - Magnesium  - Vitamin D Deficiency  - US CAMILLE  Doppler No Exercise; Future      3. Chronic bronchitis, unspecified chronic bronchitis type (H)  Stable refilled   - albuterol (PROAIR HFA/PROVENTIL HFA/VENTOLIN HFA) 108 (90 Base) MCG/ACT inhaler; Inhale 2 puffs into the lungs every 6 hours as needed for shortness of breath / dyspnea or wheezing  Dispense: 1 Inhaler; Refill: 3    4. Thyroid nodule  Due for thyroid US update   - TSH with free T4 reflex  - US Thyroid; Future    5. Other specific arthropathies, not elsewhere classified, multiple sites   - Ferritin    6. Body mass index (BMI) of 31.0-31.9 in adult   - Vitamin D Deficiency    Patient Instructions   Xray was normal   Recommend checking blood flow in legs with an ultrasound     Labs today for thyroid and check for vitamin or metabolic cause of pain       Recommend getting ultrasound of thyroid           Ana Paula Mccray NP  [facility]

## 2019-03-04 LAB — DEPRECATED CALCIDIOL+CALCIFEROL SERPL-MC: 20 UG/L (ref 20–75)

## 2019-03-07 ENCOUNTER — ANCILLARY PROCEDURE (OUTPATIENT)
Dept: ULTRASOUND IMAGING | Facility: CLINIC | Age: 59
End: 2019-03-07
Attending: NURSE PRACTITIONER
Payer: MEDICARE

## 2019-03-07 DIAGNOSIS — E04.1 THYROID NODULE: ICD-10-CM

## 2019-03-07 PROCEDURE — 76536 US EXAM OF HEAD AND NECK: CPT

## 2019-03-14 ENCOUNTER — HOSPITAL ENCOUNTER (OUTPATIENT)
Dept: ULTRASOUND IMAGING | Facility: CLINIC | Age: 59
Discharge: HOME | End: 2019-03-14
Attending: NURSE PRACTITIONER | Admitting: NURSE PRACTITIONER
Payer: MEDICARE

## 2019-03-14 DIAGNOSIS — M79.605 LEG PAIN, BILATERAL: ICD-10-CM

## 2019-03-14 DIAGNOSIS — R09.89 OTHER SPECIFIED SYMPTOMS AND SIGNS INVOLVING THE CIRCULATORY AND RESPIRATORY SYSTEMS: ICD-10-CM

## 2019-03-14 DIAGNOSIS — M79.604 LEG PAIN, BILATERAL: ICD-10-CM

## 2019-03-14 PROCEDURE — 93922 UPR/L XTREMITY ART 2 LEVELS: CPT

## 2019-03-18 ENCOUNTER — OFFICE VISIT (OUTPATIENT)
Dept: FAMILY MEDICINE | Facility: CLINIC | Age: 59
End: 2019-03-18
Payer: MEDICARE

## 2019-03-18 VITALS
TEMPERATURE: 99 F | OXYGEN SATURATION: 99 % | DIASTOLIC BLOOD PRESSURE: 68 MMHG | WEIGHT: 176 LBS | HEART RATE: 82 BPM | SYSTOLIC BLOOD PRESSURE: 116 MMHG | BODY MASS INDEX: 32.98 KG/M2

## 2019-03-18 DIAGNOSIS — M79.604 BILATERAL LEG PAIN: Primary | ICD-10-CM

## 2019-03-18 DIAGNOSIS — M79.605 BILATERAL LEG PAIN: Primary | ICD-10-CM

## 2019-03-18 PROCEDURE — 99213 OFFICE O/P EST LOW 20 MIN: CPT | Performed by: NURSE PRACTITIONER

## 2019-03-18 RX ORDER — PREGABALIN 75 MG/1
75 CAPSULE ORAL AT BEDTIME
Qty: 30 CAPSULE | Refills: 5 | Status: SHIPPED | OUTPATIENT
Start: 2019-03-18 | End: 2019-05-10

## 2019-03-18 RX ORDER — PREGABALIN 75 MG/1
75 CAPSULE ORAL 2 TIMES DAILY
Qty: 60 CAPSULE | Refills: 3 | Status: SHIPPED | OUTPATIENT
Start: 2019-03-18 | End: 2019-03-18

## 2019-03-18 ASSESSMENT — PAIN SCALES - GENERAL: PAINLEVEL: EXTREME PAIN (8)

## 2019-03-18 NOTE — PROGRESS NOTES
SUBJECTIVE:   Vesta Rodriguez is a 58 year old female who presents to clinic today for the following health issues:      Both legs     Onset: since january    Description:   Location: both legs   Character: Sharp and burning with walking     Intensity: 8/10    Progression of Symptoms: worse    Accompanying Signs & Symptoms:  Other symptoms: radiation of pain to feet     History:   Previous similar pain: no       Precipitating factors:   Trauma or overuse: no     Alleviating factors:  Improved by: nothing    Therapies Tried and outcome: [facility], had xray and US CAMILLE     Xrays, US CAMILLE, labs- all normal   Working with physical therapy- no change     Problem list and histories reviewed & adjusted, as indicated.  Additional history: as documented    Patient Active Problem List   Diagnosis     Brachial neuritis or radiculitis     Osteoporosis     Displacement of lumbar intervertebral disc without myelopathy     Restless legs syndrome (RLS)     Disturbance in sleep behavior     Displacement of cervical intervertebral disc without myelopathy     Anxiety     CARDIOVASCULAR SCREENING; LDL GOAL LESS THAN 130     Tobacco user     Family history of breast cancer in mother     Vitamin D deficiency     Chronic cholecystitis     H/O of right ureteral stone     Thyroid nodule; on outsie dc,due for f/u 5/13     H/O vertebroplasty     COPD (chronic obstructive pulmonary disease); spirometry 3/10 ; rare use of mdi     Alcoholism (H)     Graves disease     S/P cholecystectomy     Major depressive disorder, recurrent episode, moderate (H)     Advance Care Planning     Colitis     Compression fracture     GERD (gastroesophageal reflux disease)     H/O: hysterectomy     Lumbar stenosis     CATALINA (obstructive sleep apnea)     Sleep apnea     S/P lumbar fusion     Spondylolisthesis of lumbar region     Past Surgical History:   Procedure Laterality Date     FUSION CERVICAL ANTERIOR TWO LEVELS      C 5-7     FUSION LUMBAR  ANTERIOR THREE+ LEVELS      L3- SI     HYSTERECTOMY, DEYSI      no oophorectomy     INJECT EPIDURAL TRANSFORAMINAL Bilateral 3/9/2017    Procedure: INJECT EPIDURAL TRANSFORAMINAL;  Surgeon: Pérez Valle MD;  [facility]     INJECT EPIDURAL TRANSFORAMINAL N/A 2018    Procedure: INJECT EPIDURAL TRANSFORAMINAL;  transforaminal epidural steroid injection, thoracic 12 - lumbar 1;  Surgeon: Pérez Valle MD;  [facility]     LAPAROSCOPIC CHOLECYSTECTOMY  2012    Procedure: LAPAROSCOPIC CHOLECYSTECTOMY;  Laparoscopic vs Open Cholecystectomy;  Surgeon: Esperanza Quinn MD;  Location: [facility]     LASER HOLMIUM LITHOTRIPSY URETER(S), INSERT STENT, COMBINED  2012    Procedure: COMBINED CYSTOSCOPY, URETEROSCOPY, LASER HOLMIUM LITHOTRIPSY URETER(S), INSERT STENT;  Cystoscopy, Right Ureteroscopy Holmium Laser Lithotripsy , right ureteral stent placement *Latex Safe*;  Surgeon: Joshua Rm MD;  [facility]     SURGICAL HISTORY OF -        section     SURGICAL HISTORY OF -       Tubal ligation     SURGICAL HISTORY OF -       Post hip fracture     SURGICAL HISTORY OF -       hardware removal, right iliac crest      VERTEBROPLASTY         Social History     Tobacco Use     Smoking status: Former Smoker     Packs/day: 0.50     Types: Cigarettes     Last attempt to quit: 2018     Years since quittin.5     Smokeless tobacco: Never Used   Substance Use Topics     Alcohol use: No     Alcohol/week: 0.0 oz     Family History   Problem Relation Age of Onset     Heart Disease Brother      Breast Cancer Mother          age 31     Alcohol/Drug Other            Reviewed and updated as needed this visit by clinical staff       Reviewed and updated as needed this visit by Provider         ROS:  Constitutional, HEENT, cardiovascular, pulmonary, gi and gu systems are negative, except as otherwise noted.    OBJECTIVE:                                                     /68   Pulse 82   Temp 99  F (37.2  C) (Tympanic)   Wt 79.8 kg (176 lb)   SpO2 99%   BMI 32.98 kg/m    Body mass index is 32.98 kg/m .  GENERAL APPEARANCE: healthy, alert and no distress  MS: extremities normal- no gross deformities noted    Diagnostic test results:  Diagnostic Test Results:  none      ASSESSMENT/PLAN:                                                    1. Bilateral leg pain  Unclear etiology   With unremarkable work up thus far   Will refer to sports medicine   Restart lyrica   She has follow up planned with spine specialist   - ORTHO  REFERRAL  - pregabalin (LYRICA) 75 MG capsule; Take 1 capsule (75 mg) by mouth At Bedtime (Patient not taking: Reported on 3/21/2019)  Dispense: 30 capsule; Refill: 5      Patient Instructions   Check with spine specialist if this pain could be related to back     Will restart the lyrica 75 mg twice a day     Referral placed for sports medicine- they will call you     Try low impact exercising like biking, pool therapy       Ana Paula Mccray NP  [facility]

## 2019-03-18 NOTE — PATIENT INSTRUCTIONS
Check with spine specialist if this pain could be related to back     Will restart the lyrica 75 mg twice a day     Referral placed for sports medicine- they will call you     Try low impact exercising like biking, pool therapy

## 2019-03-18 NOTE — NURSING NOTE
"Chief Complaint   Patient presents with     Musculoskeletal Problem     since january, both legs        Initial /68   Pulse 82   Temp 99  F (37.2  C) (Tympanic)   Wt 79.8 kg (176 lb)   SpO2 99%   BMI 32.98 kg/m   Estimated body mass index is 32.98 kg/m  as calculated from the following:    Height as of 3/1/19: 1.556 m (5' 1.25\").    Weight as of this encounter: 79.8 kg (176 lb).    Patient presents to the clinic using No DME    Health Maintenance that is potentially due pending provider review:  NONE    n/a    Is there anyone who you would like to be able to receive your results? No  If yes have patient fill out JASIEL    "

## 2019-03-21 ENCOUNTER — OFFICE VISIT (OUTPATIENT)
Dept: ORTHOPEDICS | Facility: CLINIC | Age: 59
End: 2019-03-21
Payer: MEDICARE

## 2019-03-21 VITALS
DIASTOLIC BLOOD PRESSURE: 71 MMHG | HEIGHT: 61 IN | SYSTOLIC BLOOD PRESSURE: 111 MMHG | WEIGHT: 176 LBS | BODY MASS INDEX: 33.23 KG/M2

## 2019-03-21 DIAGNOSIS — Z98.890 S/P KYPHOPLASTY: ICD-10-CM

## 2019-03-21 DIAGNOSIS — M54.5 CHRONIC BILATERAL LOW BACK PAIN, WITH SCIATICA PRESENCE UNSPECIFIED: Primary | ICD-10-CM

## 2019-03-21 DIAGNOSIS — G89.29 CHRONIC BILATERAL LOW BACK PAIN, WITH SCIATICA PRESENCE UNSPECIFIED: Primary | ICD-10-CM

## 2019-03-21 DIAGNOSIS — Z98.1 S/P SPINAL FUSION: ICD-10-CM

## 2019-03-21 PROCEDURE — 99203 OFFICE O/P NEW LOW 30 MIN: CPT | Performed by: FAMILY MEDICINE

## 2019-03-21 ASSESSMENT — MIFFLIN-ST. JEOR: SCORE: 1324.67

## 2019-03-21 NOTE — PROGRESS NOTES
Vesta Rodriguez  :  1960  DOS: 3/21/2019  MRN: [id number]    Sports Medicine Clinic Visit    PCP: Ana Paula Mccrayelver Rodriguez is a 58 year old female who is seen in consultation at the request of  Ana Paula Mccray N.P. presenting with bilateral lower leg pain.    Injury: Gradual onset of bilateral radiating lower leg pain over the last ~ 2 months.  Pain located over bilateral proximal anterior lower leg, radiating to dorsal foot and distal thigh.  Reports intermittent radiating, numbness and tingling to bilateral dorsal foot, mostly with prolonged sitting.  Additional Features:  Positive: numbness and weakness.  Symptoms are better with Rest.  Symptoms are worse with: prolonged sitting, walking, bending.  Other evaluation and/or treatments so far consists of: Ibuprofen, Rest and physical therapy, PCP, ER visit.  Recent imaging completed: X-rays bilateral tibia completed 3/1/19.  Prior History of related problems: Patient is s/p T12 - L2 spinal fusion on 18 @ [facility].  Notes that prior to her surgery most pain was located in her low back with radiation to anterior thighs.  Patient was rx'd Lyrica on 3/18, but has not started at this time.    Social History: disabled d/t chronic low back pain    Review of Systems  Musculoskeletal: as above  Remainder of review of systems is negative including constitutional, CV, pulmonary, GI, Skin and Neurologic except as noted in HPI or medical history.    Past Medical History:   Diagnosis Date     Anemia      Anemia due to blood loss, acute 10/21/2012     Anxiety      Back pain      Chronic cholecystitis      Degeneration of cervical intervertebral disc     04     Degeneration of lumbar or lumbosacral intervertebral disc      Major depression in remission (H)      Osteoporosis      Right ureteral stone      RLS (restless legs syndrome)      Sleep disturbance      UTI (urinary tract infection)     RECENT- ON ANTIBIOTIC, STILL BACTERIA IN URINE     Vitamin  "D deficiency      Past Surgical History:   Procedure Laterality Date     FUSION CERVICAL ANTERIOR TWO LEVELS      C 5-7     FUSION LUMBAR ANTERIOR THREE+ LEVELS      L3- SI     HYSTERECTOMY, DEYSI      no oophorectomy     INJECT EPIDURAL TRANSFORAMINAL Bilateral 3/9/2017    Procedure: INJECT EPIDURAL TRANSFORAMINAL;  Surgeon: Pérez Valle MD;  [facility]     INJECT EPIDURAL TRANSFORAMINAL N/A 2018    Procedure: INJECT EPIDURAL TRANSFORAMINAL;  transforaminal epidural steroid injection, thoracic 12 - lumbar 1;  Surgeon: Pérez Valle MD;  [facility]     LAPAROSCOPIC CHOLECYSTECTOMY  2012    Procedure: LAPAROSCOPIC CHOLECYSTECTOMY;  Laparoscopic vs Open Cholecystectomy;  Surgeon: Esperanza Quinn MD;  Location: [facility]     LASER HOLMIUM LITHOTRIPSY URETER(S), INSERT STENT, COMBINED  2012    Procedure: COMBINED CYSTOSCOPY, URETEROSCOPY, LASER HOLMIUM LITHOTRIPSY URETER(S), INSERT STENT;  Cystoscopy, Right Ureteroscopy Holmium Laser Lithotripsy , right ureteral stent placement *Latex Safe*;  Surgeon: Joshua Rm MD;  [facility]     SURGICAL HISTORY OF -        section     SURGICAL HISTORY OF -       Tubal ligation     SURGICAL HISTORY OF -       Post hip fracture     SURGICAL HISTORY OF -       hardware removal, right iliac crest      VERTEBROPLASTY         Objective  /71   Ht 1.556 m (5' 1.25\")   Wt 79.8 kg (176 lb)   BMI 32.98 kg/m      General: healthy, alert and in no distress    HEENT: no scleral icterus or conjunctival erythema   Skin: no suspicious lesions or rash. No jaundice.   CV: regular rhythm by palpation, 2+ distal pulses, no pedal edema    Resp: normal respiratory effort without conversational dyspnea   Psych: normal mood and affect    Gait: nonantalgic, appropriate coordination and balance   Neuro: normal light touch sensory exam of the extremities. Motor strength as noted below     Low back exam:    Inspection:       " no visible deformity in the low back       normal skin       normal vascular       normal lymphatic    ROM:       limited flexion due to pain       limited extension due to pain    Tender:       paraspinal muscles       R>L    Non Tender:       remainder of lumbar spine       midline    Strength:       hip flexion 5/5       knee extension 5/5       ankle dorsiflexion 5/5       ankle plantarflexion 5/5       dorsiflexion of the great toe 5/5    Reflexes:       patellar (L3, L4) symmetric normal       achilles tendons (S1) symmetric normal    Sensation:      grossly intact throughout lower extremities    Skin:       well perfused       capillary refill brisk    Special tests:       straight leg raise left neg        straight leg raise right neg        slump test neg       Radiology:  Outside imaging reports reviewed in detail at time of visit today    Assessment:  1. Chronic bilateral low back pain, with sciatica presence unspecified    2. S/P spinal fusion    3. S/P kyphoplasty        Plan:  Discussed the assessment with the patient.  Follow up: prn  Reviewed PT options and alternatives in detail  Given her prior procedures, recommended ongoing follow up with spine surgery team for both evaluation of hardware, as well as any next steps to consdier  Diagnostic injections could be considered, but would defer to spine surgery team for their thoughts, as exam is not suggesting something obviously radicular today  Low impact activity strategies and goals in detail  We discussed modified progressive pain-free activity as tolerated  Home handouts provided and supportive care reviewed  All questions were answered today  Contact us with additional questions or concerns  Signs and sx of concern reviewed    Thanks very much for sending this nice lady to us, I will keep you updated with her progress      Vignesh Hwang DO, CAQ  Primary Care Sports Medicine  [facility]             Disclaimer: This note  consists of symbols derived from keyboarding, dictation and/or voice recognition software. As a result, there may be errors in the script that have gone undetected. Please consider this when interpreting information found in this chart.

## 2019-03-21 NOTE — LETTER
3/21/2019         RE: Vesta Rodriguez  [address] [facility] [address]        Dear Colleague,    Thank you for referring your patient, Vesta Rodriguez, to the [facility]. Please see a copy of my visit note below.    Vesta Rodriguez  :  1960  DOS: 3/21/2019  MRN: [id number]    Sports Medicine Clinic Visit    PCP: Ana Paula Mccray    Vesta Rodriguez is a 58 year old female who is seen in consultation at the request of  Ana Paula Mccray N.P. presenting with bilateral lower leg pain.    Injury: Gradual onset of bilateral radiating lower leg pain over the last ~ 2 months.  Pain located over bilateral proximal anterior lower leg, radiating to dorsal foot and distal thigh.  Reports intermittent radiating, numbness and tingling to bilateral dorsal foot, mostly with prolonged sitting.  Additional Features:  Positive: numbness and weakness.  Symptoms are better with Rest.  Symptoms are worse with: prolonged sitting, walking, bending.  Other evaluation and/or treatments so far consists of: Ibuprofen, Rest and physical therapy, PCP, ER visit.  Recent imaging completed: X-rays bilateral tibia completed 3/1/19.  Prior History of related problems: Patient is s/p T12 - L2 spinal fusion on 18 @ [facility].  Notes that prior to her surgery most pain was located in her low back with radiation to anterior thighs.  Patient was rx'd Lyrica on 3/18, but has not started at this time.    Social History: disabled d/t chronic low back pain    Review of Systems  Musculoskeletal: as above  Remainder of review of systems is negative including constitutional, CV, pulmonary, GI, Skin and Neurologic except as noted in HPI or medical history.    Past Medical History:   Diagnosis Date     Anemia      Anemia due to blood loss, acute 10/21/2012     Anxiety      Back pain      Chronic cholecystitis      Degeneration of cervical intervertebral disc     04     Degeneration of lumbar or  "lumbosacral intervertebral disc      Major depression in remission (H)      Osteoporosis      Right ureteral stone      RLS (restless legs syndrome)      Sleep disturbance      UTI (urinary tract infection)     RECENT- ON ANTIBIOTIC, STILL BACTERIA IN URINE     Vitamin D deficiency      Past Surgical History:   Procedure Laterality Date     FUSION CERVICAL ANTERIOR TWO LEVELS      C 5-7     FUSION LUMBAR ANTERIOR THREE+ LEVELS      L3- SI     HYSTERECTOMY, DEYSI      no oophorectomy     INJECT EPIDURAL TRANSFORAMINAL Bilateral 3/9/2017    Procedure: INJECT EPIDURAL TRANSFORAMINAL;  Surgeon: Pérez Valle MD;  [facility]     INJECT EPIDURAL TRANSFORAMINAL N/A 2018    Procedure: INJECT EPIDURAL TRANSFORAMINAL;  transforaminal epidural steroid injection, thoracic 12 - lumbar 1;  Surgeon: Pérez Valle MD;  [facility]     LAPAROSCOPIC CHOLECYSTECTOMY  2012    Procedure: LAPAROSCOPIC CHOLECYSTECTOMY;  Laparoscopic vs Open Cholecystectomy;  Surgeon: Esperanza Quinn MD;  Location: [facility]     LASER HOLMIUM LITHOTRIPSY URETER(S), INSERT STENT, COMBINED  2012    Procedure: COMBINED CYSTOSCOPY, URETEROSCOPY, LASER HOLMIUM LITHOTRIPSY URETER(S), INSERT STENT;  Cystoscopy, Right Ureteroscopy Holmium Laser Lithotripsy , right ureteral stent placement *Latex Safe*;  Surgeon: Joshua Rm MD;  [facility]     SURGICAL HISTORY OF -        section     SURGICAL HISTORY OF -       Tubal ligation     SURGICAL HISTORY OF -       Post hip fracture     SURGICAL HISTORY OF -       hardware removal, right iliac crest      VERTEBROPLASTY         Objective  /71   Ht 1.556 m (5' 1.25\")   Wt 79.8 kg (176 lb)   BMI 32.98 kg/m       General: healthy, alert and in no distress    HEENT: no scleral icterus or conjunctival erythema   Skin: no suspicious lesions or rash. No jaundice.   CV: regular rhythm by palpation, 2+ distal pulses, no pedal edema    Resp: normal " respiratory effort without conversational dyspnea   Psych: normal mood and affect    Gait: nonantalgic, appropriate coordination and balance   Neuro: normal light touch sensory exam of the extremities. Motor strength as noted below     Low back exam:    Inspection:       no visible deformity in the low back       normal skin       normal vascular       normal lymphatic    ROM:       limited flexion due to pain       limited extension due to pain    Tender:       paraspinal muscles       R>L    Non Tender:       remainder of lumbar spine       midline    Strength:       hip flexion 5/5       knee extension 5/5       ankle dorsiflexion 5/5       ankle plantarflexion 5/5       dorsiflexion of the great toe 5/5    Reflexes:       patellar (L3, L4) symmetric normal       achilles tendons (S1) symmetric normal    Sensation:      grossly intact throughout lower extremities    Skin:       well perfused       capillary refill brisk    Special tests:       straight leg raise left neg        straight leg raise right neg        slump test neg       Radiology:  Outside imaging reports reviewed in detail at time of visit today    Assessment:  1. Chronic bilateral low back pain, with sciatica presence unspecified    2. S/P spinal fusion    3. S/P kyphoplasty        Plan:  Discussed the assessment with the patient.  Follow up: prn  Reviewed PT options and alternatives in detail  Given her prior procedures, recommended ongoing follow up with spine surgery team for both evaluation of hardware, as well as any next steps to consdier  Diagnostic injections could be considered, but would defer to spine surgery team for their thoughts, as exam is not suggesting something obviously radicular today  Low impact activity strategies and goals in detail  We discussed modified progressive pain-free activity as tolerated  Home handouts provided and supportive care reviewed  All questions were answered today  Contact us with additional questions  or concerns  Signs and sx of concern reviewed    Thanks very much for sending this nice lady to us, I will keep you updated with her progress      Vignesh Hwang DO, CAKAREN  Primary Care Sports Medicine  [facility]             Disclaimer: This note consists of symbols derived from keyboarding, dictation and/or voice recognition software. As a result, there may be errors in the script that have gone undetected. Please consider this when interpreting information found in this chart.      Again, thank you for allowing me to participate in the care of your patient.        Sincerely,        Vignesh Hwang DO

## 2019-03-27 ENCOUNTER — TRANSFERRED RECORDS (OUTPATIENT)
Dept: HEALTH INFORMATION MANAGEMENT | Facility: CLINIC | Age: 59
End: 2019-03-27

## 2019-04-24 ENCOUNTER — TRANSFERRED RECORDS (OUTPATIENT)
Dept: HEALTH INFORMATION MANAGEMENT | Facility: CLINIC | Age: 59
End: 2019-04-24

## 2019-05-03 DIAGNOSIS — G25.81 RESTLESS LEG SYNDROME: ICD-10-CM

## 2019-05-03 RX ORDER — PRAMIPEXOLE DIHYDROCHLORIDE 1.5 MG/1
TABLET ORAL
Qty: 60 TABLET | Refills: 0 | Status: SHIPPED | OUTPATIENT
Start: 2019-05-03 | End: 2019-05-10

## 2019-05-03 NOTE — TELEPHONE ENCOUNTER
"Requested Prescriptions   Pending Prescriptions Disp Refills     pramipexole (MIRAPEX) 1.5 MG tablet [Pharmacy Med Name: PRAMIPEXOLE DIHYDROCHLORID 1.5 TABS] 60 tablet 5     Sig: TAKE ONE TABLET BY MOUTH TWICE A DAY       Antiparkinson's Agents Protocol Passed - 5/3/2019  3:28 PM        Passed - Blood pressure under 140/90 in past 12 months     BP Readings from Last 3 Encounters:   03/21/19 111/71   03/18/19 116/68   03/01/19 92/64                 Passed - CBC on record in past 12 months     Recent Labs   Lab Test 03/01/19  1145   WBC 5.5   RBC 4.39   HGB 12.3   HCT 38.4                    Passed - ALT on record in past 12 months         Recent Labs   Lab Test 03/01/19  1145   ALT 25             Passed - Serum Creatinine on file in past 12 months     Recent Labs   Lab Test 03/01/19  1145   CR 0.79             Passed - Medication is active on med list        Passed - Patient is age 18 or older        Passed - No active pregnancy on record        Passed - No positive pregnancy test in the past 12 months        Passed - Recent (6 mo) or future (30 days) visit within the authorizing provider's specialty     Patient had office visit in the last 6 months or has a visit in the next 30 days with authorizing provider or within the authorizing provider's specialty.  See \"Patient Info\" tab in inbasket, or \"Choose Columns\" in Meds & Orders section of the refill encounter.          pramipexole (MIRAPEX) 1.5 MG tablet  Last Written Prescription Date:  10/29/2018  Last Fill Quantity: 60 tablet,  # refills: 5   Last office visit: 3/18/2019 with prescribing provider:  DENYS Mccray   Future Office Visit:      Luisa Cueva RT (R) (M)      "

## 2019-05-10 ENCOUNTER — OFFICE VISIT (OUTPATIENT)
Dept: FAMILY MEDICINE | Facility: CLINIC | Age: 59
End: 2019-05-10
Payer: MEDICARE

## 2019-05-10 VITALS
SYSTOLIC BLOOD PRESSURE: 126 MMHG | TEMPERATURE: 98 F | HEIGHT: 61 IN | HEART RATE: 84 BPM | WEIGHT: 183 LBS | BODY MASS INDEX: 34.55 KG/M2 | RESPIRATION RATE: 16 BRPM | DIASTOLIC BLOOD PRESSURE: 70 MMHG

## 2019-05-10 DIAGNOSIS — M62.838 MUSCLE SPASM: ICD-10-CM

## 2019-05-10 DIAGNOSIS — G25.81 RESTLESS LEG SYNDROME: ICD-10-CM

## 2019-05-10 DIAGNOSIS — Z82.0 FAMILY HISTORY OF MS (MULTIPLE SCLEROSIS): Primary | ICD-10-CM

## 2019-05-10 PROCEDURE — 99213 OFFICE O/P EST LOW 20 MIN: CPT | Performed by: NURSE PRACTITIONER

## 2019-05-10 RX ORDER — PRAMIPEXOLE DIHYDROCHLORIDE 1.5 MG/1
TABLET ORAL
Qty: 90 TABLET | Refills: 3 | Status: SHIPPED | OUTPATIENT
Start: 2019-05-10 | End: 2019-12-13

## 2019-05-10 RX ORDER — CYCLOBENZAPRINE HCL 5 MG
1 TABLET ORAL SEE ADMIN INSTRUCTIONS
COMMUNITY
Start: 2019-05-09 | End: 2019-07-05

## 2019-05-10 RX ORDER — METHOCARBAMOL 500 MG/1
1 TABLET, FILM COATED ORAL SEE ADMIN INSTRUCTIONS
COMMUNITY
Start: 2019-05-09 | End: 2019-07-26

## 2019-05-10 RX ORDER — TOPIRAMATE 25 MG/1
25 TABLET, FILM COATED ORAL 2 TIMES DAILY
COMMUNITY
End: 2020-03-20

## 2019-05-10 ASSESSMENT — MIFFLIN-ST. JEOR: SCORE: 1356.42

## 2019-05-10 NOTE — NURSING NOTE
"Chief Complaint   Patient presents with     Restless legs     med recheck       Initial /70 (BP Location: Right arm, Patient Position: Chair, Cuff Size: Adult Large)   Pulse 84   Temp 98  F (36.7  C) (Tympanic)   Resp 16   Ht 1.556 m (5' 1.25\")   Wt 83 kg (183 lb)   BMI 34.30 kg/m   Estimated body mass index is 34.3 kg/m  as calculated from the following:    Height as of this encounter: 1.556 m (5' 1.25\").    Weight as of this encounter: 83 kg (183 lb).    Patient presents to the clinic using No DME    Health Maintenance that is potentially due pending provider review:  PHQ9    n/a    Is there anyone who you would like to be able to receive your results? No  If yes have patient fill out JASIEL    "

## 2019-05-10 NOTE — PROGRESS NOTES
SUBJECTIVE:   Vesta Rodriguez is a 58 year old female who presents to clinic today for the following   health issues:      Medication Followup of Mirapex    Taking Medication as prescribed: yes    Side Effects:  None    Medication Helping Symptoms:  Feels that it helps, but not all the time. Still experiencing Sx's of restless legs at night at times.       Was seen by pain specialist at [facility] sister Toribio   Started experiencing pain in back as well as legs     Was started on topamax, methocarbamol and cyclobenzaprine     She was recommend that she see a neurology due to the muscle spasms and family history of MS     Taking mirapex   Feels to do best 3 mg at bedtime and feels that she would benefit from taking it during the day as well     No longer walking around track     Has urinary incontinence       Additional history: as documented    Reviewed  and updated as needed this visit by clinical staff         Reviewed and updated as needed this visit by Provider         Patient Active Problem List   Diagnosis     Brachial neuritis or radiculitis     Osteoporosis     Displacement of lumbar intervertebral disc without myelopathy     Restless legs syndrome (RLS)     Disturbance in sleep behavior     Displacement of cervical intervertebral disc without myelopathy     Anxiety     CARDIOVASCULAR SCREENING; LDL GOAL LESS THAN 130     Tobacco user     Family history of breast cancer in mother     Vitamin D deficiency     Chronic cholecystitis     H/O of right ureteral stone     Thyroid nodule; on outsie dc,due for f/u 5/13     H/O vertebroplasty     COPD (chronic obstructive pulmonary disease); spirometry 3/10 ; rare use of mdi     Alcoholism (H)     Graves disease     S/P cholecystectomy     Major depressive disorder, recurrent episode, moderate (H)     Advance Care Planning     Colitis     Compression fracture     GERD (gastroesophageal reflux disease)     H/O: hysterectomy     Lumbar stenosis     CATALINA (obstructive sleep  apnea)     Sleep apnea     S/P lumbar fusion     Spondylolisthesis of lumbar region     Past Surgical History:   Procedure Laterality Date     FUSION CERVICAL ANTERIOR TWO LEVELS      C 5-7     FUSION LUMBAR ANTERIOR THREE+ LEVELS      L3- SI     HYSTERECTOMY, DEYSI      no oophorectomy     INJECT EPIDURAL TRANSFORAMINAL Bilateral 3/9/2017    Procedure: INJECT EPIDURAL TRANSFORAMINAL;  Surgeon: Pérez Valle MD;  [facility]     INJECT EPIDURAL TRANSFORAMINAL N/A 2018    Procedure: INJECT EPIDURAL TRANSFORAMINAL;  transforaminal epidural steroid injection, thoracic 12 - lumbar 1;  Surgeon: Pérez Valle MD;  [facility]     LAPAROSCOPIC CHOLECYSTECTOMY  2012    Procedure: LAPAROSCOPIC CHOLECYSTECTOMY;  Laparoscopic vs Open Cholecystectomy;  Surgeon: Esperanza Quinn MD;  Location: [facility]     LASER HOLMIUM LITHOTRIPSY URETER(S), INSERT STENT, COMBINED  2012    Procedure: COMBINED CYSTOSCOPY, URETEROSCOPY, LASER HOLMIUM LITHOTRIPSY URETER(S), INSERT STENT;  Cystoscopy, Right Ureteroscopy Holmium Laser Lithotripsy , right ureteral stent placement *Latex Safe*;  Surgeon: Joshua Rm MD;  [facility]     SURGICAL HISTORY OF -        section     SURGICAL HISTORY OF -       Tubal ligation     SURGICAL HISTORY OF -       Post hip fracture     SURGICAL HISTORY OF -       hardware removal, right iliac crest      VERTEBROPLASTY         Social History     Tobacco Use     Smoking status: Former Smoker     Packs/day: 0.50     Types: Cigarettes     Last attempt to quit: 2018     Years since quittin.6     Smokeless tobacco: Never Used   Substance Use Topics     Alcohol use: No     Alcohol/week: 0.0 oz     Family History   Problem Relation Age of Onset     Heart Disease Brother      Breast Cancer Mother          age 31     Alcohol/Drug Other            ROS:  Constitutional, HEENT, cardiovascular, pulmonary, gi and gu systems are negative, except  "as otherwise noted.    OBJECTIVE:                                                    /70 (BP Location: Right arm, Patient Position: Chair, Cuff Size: Adult Large)   Pulse 84   Temp 98  F (36.7  C) (Tympanic)   Resp 16   Ht 1.556 m (5' 1.25\")   Wt 83 kg (183 lb)   BMI 34.30 kg/m    Body mass index is 34.3 kg/m .  GENERAL APPEARANCE: healthy, alert and no distress  RESP: lungs clear to auscultation - no rales, rhonchi or wheezes  MS: extremities normal- no gross deformities noted  PSYCH: mentation appears normal and affect normal/bright    Diagnostic test results:  Diagnostic Test Results:  none      ASSESSMENT/PLAN:                                                    1. Family history of MS (multiple sclerosis)  2. Muscle spasm  Referral placed for neurology   - NEUROLOGY ADULT REFERRAL    3. Restless leg syndrome  - pramipexole (MIRAPEX) 1.5 MG tablet; Take 1 tablet (1.5 mg) by mouth every morning AND 2 tablets (3 mg) At Bedtime.  Dispense: 90 tablet; Refill: 3      Patient Instructions   Refilled Mirapex   Referral for neurology placed   Continue follow up with pain specialist             Ana Paula Mccray NP  [facility]      "

## 2019-05-22 ENCOUNTER — TRANSFERRED RECORDS (OUTPATIENT)
Dept: HEALTH INFORMATION MANAGEMENT | Facility: CLINIC | Age: 59
End: 2019-05-22

## 2019-07-05 ENCOUNTER — OFFICE VISIT (OUTPATIENT)
Dept: FAMILY MEDICINE | Facility: CLINIC | Age: 59
End: 2019-07-05
Payer: MEDICARE

## 2019-07-05 VITALS
TEMPERATURE: 98.5 F | HEIGHT: 61 IN | HEART RATE: 103 BPM | WEIGHT: 171 LBS | DIASTOLIC BLOOD PRESSURE: 70 MMHG | OXYGEN SATURATION: 98 % | BODY MASS INDEX: 32.28 KG/M2 | RESPIRATION RATE: 12 BRPM | SYSTOLIC BLOOD PRESSURE: 118 MMHG

## 2019-07-05 DIAGNOSIS — M79.604 BILATERAL LEG PAIN: ICD-10-CM

## 2019-07-05 DIAGNOSIS — G25.81 RESTLESS LEG SYNDROME: ICD-10-CM

## 2019-07-05 DIAGNOSIS — M79.605 BILATERAL LEG PAIN: ICD-10-CM

## 2019-07-05 DIAGNOSIS — L03.032 ONYCHIA OF TOE, LEFT: Primary | ICD-10-CM

## 2019-07-05 DIAGNOSIS — M62.838 MUSCLE SPASM: ICD-10-CM

## 2019-07-05 DIAGNOSIS — R76.8 ANA POSITIVE: ICD-10-CM

## 2019-07-05 PROCEDURE — 99214 OFFICE O/P EST MOD 30 MIN: CPT | Performed by: NURSE PRACTITIONER

## 2019-07-05 RX ORDER — TERBINAFINE HYDROCHLORIDE 250 MG/1
250 TABLET ORAL DAILY
Qty: 90 TABLET | Refills: 0 | Status: SHIPPED | OUTPATIENT
Start: 2019-07-05 | End: 2019-11-25

## 2019-07-05 RX ORDER — HYDROXYZINE PAMOATE 25 MG/1
25-50 CAPSULE ORAL
COMMUNITY
Start: 2017-05-31 | End: 2019-07-05

## 2019-07-05 RX ORDER — HYDROXYZINE PAMOATE 25 MG/1
CAPSULE ORAL
Refills: 1 | COMMUNITY
Start: 2019-02-10 | End: 2019-07-05

## 2019-07-05 RX ORDER — CYCLOBENZAPRINE HCL 10 MG
TABLET ORAL
Refills: 0 | COMMUNITY
Start: 2018-11-28 | End: 2024-01-23

## 2019-07-05 ASSESSMENT — MIFFLIN-ST. JEOR: SCORE: 1301.99

## 2019-07-05 ASSESSMENT — PATIENT HEALTH QUESTIONNAIRE - PHQ9: SUM OF ALL RESPONSES TO PHQ QUESTIONS 1-9: 6

## 2019-07-05 NOTE — PROGRESS NOTES
SUBJECTIVE   Vesta A Michael is a 58 year old female who presents with     Big toe on left foot       Duration: since January, possibly from a pool for physical therapy     Description (location/character/radiation): left big toe    Intensity:  moderate    Accompanying signs and symptoms: tip of toe gets numb, yellowish in color, swollen    History (similar episodes/previous evaluation): None    Precipitating or alleviating factors: None    Therapies tried and outcome: OTC antifungal medication isnt working      Concern - referral for rheumatology   Go over lab results from 5/30/19 , Dr. Sanchez recommended to get a referral from primary for rheumatology     PCP   Ana Paula Mccray [phone]    Health Maintenance        Health Maintenance Due   Topic Date Due     HIV SCREENING  12/17/1976     ZOSTER IMMUNIZATION (1 of 2) 12/17/2011     PHQ-9  03/10/2019     DTAP/TDAP/TD IMMUNIZATION (2 - Td) 04/07/2019       HPI        Patient Active Problem List   Diagnosis     Brachial neuritis or radiculitis     Osteoporosis     Displacement of lumbar intervertebral disc without myelopathy     Restless legs syndrome (RLS)     Disturbance in sleep behavior     Displacement of cervical intervertebral disc without myelopathy     Anxiety     CARDIOVASCULAR SCREENING; LDL GOAL LESS THAN 130     Tobacco user     Family history of breast cancer in mother     Vitamin D deficiency     Chronic cholecystitis     H/O of right ureteral stone     Thyroid nodule; on outsie dc,due for f/u 5/13     H/O vertebroplasty     COPD (chronic obstructive pulmonary disease); spirometry 3/10 ; rare use of mdi     Alcoholism (H)     Graves disease     S/P cholecystectomy     Major depressive disorder, recurrent episode, moderate (H)     Advance Care Planning     Colitis     Compression fracture     GERD (gastroesophageal reflux disease)     H/O: hysterectomy     Lumbar stenosis     CATALINA (obstructive sleep apnea)     Sleep apnea     S/P lumbar  fusion     Spondylolisthesis of lumbar region     Current Outpatient Medications   Medication     albuterol (PROAIR HFA/PROVENTIL HFA/VENTOLIN HFA) 108 (90 Base) MCG/ACT inhaler     alendronate (FOSAMAX) 70 MG tablet     buPROPion (WELLBUTRIN SR) 150 MG 12 hr tablet     cyclobenzaprine (FLEXERIL) 5 MG tablet     Ergocalciferol (VITAMIN D2) 2000 units CAPS     fluticasone-salmeterol (ADVAIR) 100-50 MCG/DOSE diskus inhaler     hydrOXYzine (ATARAX) 25 MG tablet     methocarbamol (ROBAXIN) 500 MG tablet     naproxen (NAPROSYN) 500 MG tablet     order for DME     pramipexole (MIRAPEX) 1.5 MG tablet     topiramate (TOPAMAX) 25 MG tablet     No current facility-administered medications for this visit.        Patient Active Problem List   Diagnosis     Brachial neuritis or radiculitis     Osteoporosis     Displacement of lumbar intervertebral disc without myelopathy     Restless legs syndrome (RLS)     Disturbance in sleep behavior     Displacement of cervical intervertebral disc without myelopathy     Anxiety     CARDIOVASCULAR SCREENING; LDL GOAL LESS THAN 130     Tobacco user     Family history of breast cancer in mother     Vitamin D deficiency     Chronic cholecystitis     H/O of right ureteral stone     Thyroid nodule; on outsie dc,due for f/u 5/13     H/O vertebroplasty     COPD (chronic obstructive pulmonary disease); spirometry 3/10 ; rare use of mdi     Alcoholism (H)     Graves disease     S/P cholecystectomy     Major depressive disorder, recurrent episode, moderate (H)     Advance Care Planning     Colitis     Compression fracture     GERD (gastroesophageal reflux disease)     H/O: hysterectomy     Lumbar stenosis     CATALINA (obstructive sleep apnea)     Sleep apnea     S/P lumbar fusion     Spondylolisthesis of lumbar region     Past Surgical History:   Procedure Laterality Date     FUSION CERVICAL ANTERIOR TWO LEVELS      C 5-7     FUSION LUMBAR ANTERIOR THREE+ LEVELS      L3- SI     HYSTERECTOMY, DESYI  1993     no oophorectomy     INJECT EPIDURAL TRANSFORAMINAL Bilateral 3/9/2017    Procedure: INJECT EPIDURAL TRANSFORAMINAL;  Surgeon: Pérez Valle MD;  [facility]     INJECT EPIDURAL TRANSFORAMINAL N/A 2018    Procedure: INJECT EPIDURAL TRANSFORAMINAL;  transforaminal epidural steroid injection, thoracic 12 - lumbar 1;  Surgeon: Pérez Valle MD;  [facility]     LAPAROSCOPIC CHOLECYSTECTOMY  2012    Procedure: LAPAROSCOPIC CHOLECYSTECTOMY;  Laparoscopic vs Open Cholecystectomy;  Surgeon: Esperanza Quinn MD;  Location: [facility]     LASER HOLMIUM LITHOTRIPSY URETER(S), INSERT STENT, COMBINED  2012    Procedure: COMBINED CYSTOSCOPY, URETEROSCOPY, LASER HOLMIUM LITHOTRIPSY URETER(S), INSERT STENT;  Cystoscopy, Right Ureteroscopy Holmium Laser Lithotripsy , right ureteral stent placement *Latex Safe*;  Surgeon: Joshua Rm MD;  [facility]     SURGICAL HISTORY OF -        section     SURGICAL HISTORY OF -       Tubal ligation     SURGICAL HISTORY OF -       Post hip fracture     SURGICAL HISTORY OF -       hardware removal, right iliac crest      VERTEBROPLASTY         Social History     Tobacco Use     Smoking status: Former Smoker     Packs/day: 0.50     Types: Cigarettes     Last attempt to quit: 2018     Years since quittin.8     Smokeless tobacco: Never Used   Substance Use Topics     Alcohol use: No     Alcohol/week: 0.0 oz     Family History   Problem Relation Age of Onset     Heart Disease Brother      Breast Cancer Mother          age 31     Alcohol/Drug Other            Reviewed and updated:  Tobacco  Allergies  Meds  Med Hx  Surg Hx  Fam Hx  Soc Hx     ROS:  Constitutional, HEENT, cardiovascular, pulmonary, gi and gu systems are negative, except as otherwise noted.    PHYSICAL EXAM   There were no vitals taken for this visit.  There is no height or weight on file to calculate BMI.  GENERAL: healthy, alert and no  distress  EYES: Eyes grossly normal to inspection, PERRL and conjunctivae and sclerae normal  RESP: lungs clear to auscultation - no rales, rhonchi or wheezes  CV: regular rate and rhythm, normal S1 S2, no S3 or S4, no murmur, click or rub, no peripheral edema and peripheral pulses strong  ABDOMEN: soft, nontender, no hepatosplenomegaly, no masses and bowel sounds normal  MS: no gross musculoskeletal defects noted, no edema  SKIN: left great toenail thick and yellow  NEURO: Normal strength and tone, mentation intact and speech normal  PSYCH: mentation appears normal, affect normal/bright    Assessment & Plan    1. Onychia of toe, left  - terbinafine (LAMISIL) 250 MG tablet; Take 1 tablet (250 mg) by mouth daily  Dispense: 90 tablet; Refill: 0    2. Muscle spasm  - RHEUMATOLOGY REFERRAL    3. Restless leg syndrome  - cyclobenzaprine (FLEXERIL) 10 MG tablet; TAKE ONE TABLET BY MOUTH THREE TIMES A DAY FOR 10 DAYS; Refill: 0  - RHEUMATOLOGY REFERRAL    4. Bilateral leg pain  - cyclobenzaprine (FLEXERIL) 10 MG tablet; TAKE ONE TABLET BY MOUTH THREE TIMES A DAY FOR 10 DAYS; Refill: 0  - RHEUMATOLOGY REFERRAL    5. GELY positive  - RHEUMATOLOGY REFERRAL         Patient Instructions   Will try lamisil for fungal toenails take for 3 months or until cleared   Referral for rheumatology placed    Appointments:[phone]   Jose Traore, DO      No follow-ups on file.    Ana Paula Mccray NP  [facility]      Risks, benefits, side effects and rationale for treatment plan fully discussed with the patient and understanding expressed.

## 2019-07-05 NOTE — PATIENT INSTRUCTIONS
Will try lamisil for fungal toenails take for 3 months or until cleared   Referral for rheumatology placed    Appointments:[phone]   Jose Traore, DO

## 2019-07-05 NOTE — NURSING NOTE
"Chief Complaint   Patient presents with     Referral     for rheumatology      Toe Pain     since january, possible toe infection in left big toe        Initial /70   Pulse 103   Temp 98.5  F (36.9  C) (Tympanic)   Resp 12   Ht 1.556 m (5' 1.25\")   Wt 77.6 kg (171 lb)   SpO2 98%   BMI 32.05 kg/m   Estimated body mass index is 32.05 kg/m  as calculated from the following:    Height as of this encounter: 1.556 m (5' 1.25\").    Weight as of this encounter: 77.6 kg (171 lb).    Patient presents to the clinic using No DME    Health Maintenance that is potentially due pending provider review:  PHQ9    completing today .    Is there anyone who you would like to be able to receive your results? No  If yes have patient fill out JASIEL    "

## 2019-07-26 ENCOUNTER — OFFICE VISIT (OUTPATIENT)
Dept: FAMILY MEDICINE | Facility: CLINIC | Age: 59
End: 2019-07-26
Payer: MEDICARE

## 2019-07-26 VITALS
RESPIRATION RATE: 12 BRPM | OXYGEN SATURATION: 96 % | HEART RATE: 98 BPM | TEMPERATURE: 99.2 F | WEIGHT: 174 LBS | HEIGHT: 61 IN | DIASTOLIC BLOOD PRESSURE: 68 MMHG | BODY MASS INDEX: 32.85 KG/M2 | SYSTOLIC BLOOD PRESSURE: 112 MMHG

## 2019-07-26 DIAGNOSIS — M54.50 CHRONIC BILATERAL LOW BACK PAIN WITHOUT SCIATICA: ICD-10-CM

## 2019-07-26 DIAGNOSIS — F10.21 HISTORY OF ALCOHOLISM (H): ICD-10-CM

## 2019-07-26 DIAGNOSIS — J44.9 CHRONIC OBSTRUCTIVE PULMONARY DISEASE, UNSPECIFIED COPD TYPE (H): ICD-10-CM

## 2019-07-26 DIAGNOSIS — Z98.1 S/P LUMBAR FUSION: ICD-10-CM

## 2019-07-26 DIAGNOSIS — L03.032 ONYCHIA OF TOE, LEFT: ICD-10-CM

## 2019-07-26 DIAGNOSIS — Z01.818 PREOP GENERAL PHYSICAL EXAM: Primary | ICD-10-CM

## 2019-07-26 DIAGNOSIS — G25.81 RESTLESS LEGS SYNDROME (RLS): Chronic | ICD-10-CM

## 2019-07-26 DIAGNOSIS — G89.29 CHRONIC BILATERAL LOW BACK PAIN WITHOUT SCIATICA: ICD-10-CM

## 2019-07-26 LAB
ALBUMIN SERPL-MCNC: 3.5 G/DL (ref 3.4–5)
ALP SERPL-CCNC: 93 U/L (ref 40–150)
ALT SERPL W P-5'-P-CCNC: 24 U/L (ref 0–50)
ANION GAP SERPL CALCULATED.3IONS-SCNC: 7 MMOL/L (ref 3–14)
AST SERPL W P-5'-P-CCNC: 17 U/L (ref 0–45)
BILIRUB SERPL-MCNC: 0.3 MG/DL (ref 0.2–1.3)
BUN SERPL-MCNC: 12 MG/DL (ref 7–30)
CALCIUM SERPL-MCNC: 8.6 MG/DL (ref 8.5–10.1)
CHLORIDE SERPL-SCNC: 109 MMOL/L (ref 94–109)
CO2 SERPL-SCNC: 27 MMOL/L (ref 20–32)
CREAT SERPL-MCNC: 0.63 MG/DL (ref 0.52–1.04)
ERYTHROCYTE [DISTWIDTH] IN BLOOD BY AUTOMATED COUNT: 15.6 % (ref 10–15)
GFR SERPL CREATININE-BSD FRML MDRD: >90 ML/MIN/{1.73_M2}
GLUCOSE SERPL-MCNC: 92 MG/DL (ref 70–99)
HCT VFR BLD AUTO: 37.3 % (ref 35–47)
HGB BLD-MCNC: 11.9 G/DL (ref 11.7–15.7)
MCH RBC QN AUTO: 28.7 PG (ref 26.5–33)
MCHC RBC AUTO-ENTMCNC: 31.9 G/DL (ref 31.5–36.5)
MCV RBC AUTO: 90 FL (ref 78–100)
PLATELET # BLD AUTO: 239 10E9/L (ref 150–450)
POTASSIUM SERPL-SCNC: 3.4 MMOL/L (ref 3.4–5.3)
PROT SERPL-MCNC: 7.2 G/DL (ref 6.8–8.8)
RBC # BLD AUTO: 4.14 10E12/L (ref 3.8–5.2)
SODIUM SERPL-SCNC: 143 MMOL/L (ref 133–144)
WBC # BLD AUTO: 7.4 10E9/L (ref 4–11)

## 2019-07-26 PROCEDURE — 80053 COMPREHEN METABOLIC PANEL: CPT | Performed by: NURSE PRACTITIONER

## 2019-07-26 PROCEDURE — 36415 COLL VENOUS BLD VENIPUNCTURE: CPT | Performed by: NURSE PRACTITIONER

## 2019-07-26 PROCEDURE — 99214 OFFICE O/P EST MOD 30 MIN: CPT | Performed by: NURSE PRACTITIONER

## 2019-07-26 PROCEDURE — 85027 COMPLETE CBC AUTOMATED: CPT | Performed by: NURSE PRACTITIONER

## 2019-07-26 RX ORDER — OXYCODONE HYDROCHLORIDE 5 MG/1
5 TABLET ORAL EVERY 8 HOURS PRN
Qty: 10 TABLET | Refills: 0 | Status: SHIPPED | OUTPATIENT
Start: 2019-07-26 | End: 2019-11-25

## 2019-07-26 ASSESSMENT — MIFFLIN-ST. JEOR: SCORE: 1315.6

## 2019-07-26 NOTE — PATIENT INSTRUCTIONS
Use inhalers AM of procedure   Hold all oral medication   Labs today   10 oxycodone for pain control until surgery       Before Your Surgery      Call your surgeon if there is any change in your health. This includes signs of a cold or flu (such as a sore throat, runny nose, cough, rash or fever).    Do not smoke, drink alcohol or take over the counter medicine (unless your surgeon or primary care doctor tells you to) for the 24 hours before and after surgery.    If you take prescribed drugs: Follow your doctor s orders about which medicines to take and which to stop until after surgery.    Eating and drinking prior to surgery: follow the instructions from your surgeon    Take a shower or bath the night before surgery. Use the soap your surgeon gave you to gently clean your skin. If you do not have soap from your surgeon, use your regular soap. Do not shave or scrub the surgery site.  Wear clean pajamas and have clean sheets on your bed.

## 2019-07-26 NOTE — PROGRESS NOTES
[address] [address]  [phone]  Dept: [phone]    PRE-OP EVALUATION:  Today's date: 2019    Vesta Rodriguez (: 1960) presents for pre-operative evaluation assessment as requested by Dr. Hendrickson.  She requires evaluation and anesthesia risk assessment prior to undergoing surgery/procedure for treatment of Spinal cord stimulator.    Fax number for surgical facility: [phone]  Primary Physician: Ana Paula Mccray  Type of Anesthesia Anticipated: unknown     Patient has a Health Care Directive or Living Will:  YES     Preop Questions 2019   Who is doing your surgery? ynes   What are you having done? spinal cord stimulator   Date of Surgery/Procedure: aug 7th   Facility or Hospital where procedure/surgery will be performed: - [facility]    1.  Do you have a history of Heart attack, stroke, stent, coronary bypass surgery, or other heart surgery? No   2.  Do you ever have any pain or discomfort in your chest? No   3.  Do you have a history of  Heart Failure? No   4.   Are you troubled by shortness of breath when:  walking on a level surface, or up a slight hill, or at night? YES - when walking up a hill, no chest pain    5.  Do you currently have a cold, bronchitis or other respiratory infection? No   6.  Do you have a cough, shortness of breath, or wheezing? No   7.  Do you sometimes get pains in the calves of your legs when you walk? YES -    8. Do you or anyone in your family have previous history of blood clots? UNKNOWN -    9.  Do you or does anyone in your family have a serious bleeding problem such as prolonged bleeding following surgeries or cuts? No   10. Have you ever had problems with anemia or been told to take iron pills? YES - anemic in the past    11. Have you had any abnormal blood loss such as black, tarry or bloody stools, or abnormal vaginal bleeding? YES - hemoglobin dropped levels so had to get a blood  transfusion    12. Have you ever had a blood transfusion? YES - 8-9 years ago    13. Have you or any of your relatives ever had problems with anesthesia? No   14. Do you have sleep apnea, excessive snoring or daytime drowsiness? YES - sleep apnea- has been unable to tolerate a CPAP in the past    15. Do you have any prosthetic heart valves? No   16. Do you have prosthetic joints? No   17. Is there any chance that you may be pregnant? No         HPI:     HPI related to upcoming procedure: had a temporary spinal cord stimulatory on 7/11 removed on 7/16- had allergy to the bandage and one of the leads was bleeding- she did get pain control with it so plan is to place a permanent spinal cord stimulatory on August 7th.     She is has history of chronic low back pain managed [facility]. She was referred to the pain clinic by [facility].       COPD - Patient has a longstanding history of moderate-severe COPD . Patient has been doing well overall noting shortness of breath gping up hill  and continues on medication regimen consisting of advair, albuterol as needed, without adverse reactions or side effects.    HX of sleep apnea- unable to tolerate mask in past     MEDICAL HISTORY:     Patient Active Problem List    Diagnosis Date Noted     History of alcoholism (H) 07/26/2019     Priority: Medium     Has been sober Nov 2014       CATALINA (obstructive sleep apnea) 05/09/2018     Priority: Medium     Sleep apnea 05/09/2018     Priority: Medium     Overview:   does not use CPAP due to insomnia       Spondylolisthesis of lumbar region 05/09/2018     Priority: Medium     Colitis 02/26/2018     Priority: Medium     S/P lumbar fusion 06/09/2017     Priority: Medium     Lumbar stenosis 05/30/2017     Priority: Medium     Advance Care Planning 03/08/2017     Priority: Medium     Advance Care Planning 7/5/2017: Recommend Code Status in chart and patient's Advance Care Planning documents be reviewed to ensure  alignment with patient's current wishes. Added by Mary Kay Forman MSW Advance Care Planning Liaison  Advance Care Planning 3/8/2017: ACP Review of Chart / Resources Provided:  Reviewed chart for advance care plan.  Vesta KAYCEE Rodriguez has been provided information and resources to begin or update their advance care plan.  Added by Vesta Bradley       Major depressive disorder, recurrent episode, moderate (H) 10/24/2016     Priority: Medium     S/P cholecystectomy 08/04/2016     Priority: Medium     Graves disease 01/25/2016     Priority: Medium     H/O vertebroplasty 08/12/2013     Priority: Medium     Thyroid nodule; on outsie dc,due for f/u 5/13 04/19/2013     Priority: Medium     ultrasound may , 2013  Due for followup          Compression fracture 01/16/2013     Priority: Medium     GERD (gastroesophageal reflux disease) 01/16/2013     Priority: Medium     H/O of right ureteral stone 10/05/2012     Priority: Medium     8/1612         Chronic cholecystitis 09/04/2012     Priority: Medium     H/O: hysterectomy 08/19/2012     Priority: Medium     Tobacco user 08/28/2011     Priority: Medium     Family history of breast cancer in mother 08/28/2011     Priority: Medium     Vitamin D deficiency 08/28/2011     Priority: Medium     CARDIOVASCULAR SCREENING; LDL GOAL LESS THAN 130 10/31/2010     Priority: Medium     COPD (chronic obstructive pulmonary disease); spirometry 3/10 ; rare use of mdi 03/15/2010     Priority: Medium     Anxiety 09/26/2008     Priority: Medium     Disturbance in sleep behavior 10/08/2007     Priority: Medium     Has failed mult meds, - lunesta, ambien, klonopin  etoh treatment 2013  Not candidate for klonopin      Problem list name updated by automated process. Provider to review       Displacement of cervical intervertebral disc without myelopathy 10/08/2007     Priority: Medium     Cervical fusion. [facility].     2004       Restless legs syndrome (RLS) 07/30/2007     Priority: Medium     On  mirapex, unable to go off     0.5 mg twice a day , for rls        Displacement of lumbar intervertebral disc without myelopathy 02/20/2006     Priority: Medium     2 disc fusion        Brachial neuritis or radiculitis 07/06/2005     Priority: Medium     Problem list name updated by automated process. Provider to review       Osteoporosis 07/06/2005     Priority: Medium     Hip fracture after bone graft.     forteo 3/09 - until summer 2010  Problem list name updated by automated process. Provider to review        Past Medical History:   Diagnosis Date     Anemia      Anemia due to blood loss, acute 10/21/2012     Anxiety      Back pain      Chronic cholecystitis      Degeneration of cervical intervertebral disc     04     Degeneration of lumbar or lumbosacral intervertebral disc      Major depression in remission (H)      Osteoporosis      Right ureteral stone      RLS (restless legs syndrome)      Sleep disturbance      UTI (urinary tract infection)     RECENT- ON ANTIBIOTIC, STILL BACTERIA IN URINE     Vitamin D deficiency      Past Surgical History:   Procedure Laterality Date     FUSION CERVICAL ANTERIOR TWO LEVELS      C 5-7     FUSION LUMBAR ANTERIOR THREE+ LEVELS      L3- SI     HYSTERECTOMY, DEYSI  1993    no oophorectomy     INJECT EPIDURAL TRANSFORAMINAL Bilateral 3/9/2017    Procedure: INJECT EPIDURAL TRANSFORAMINAL;  Surgeon: Pérez Valle MD;  Location: [facility] OR     INJECT EPIDURAL TRANSFORAMINAL N/A 5/24/2018    Procedure: INJECT EPIDURAL TRANSFORAMINAL;  transforaminal epidural steroid injection, thoracic 12 - lumbar 1;  Surgeon: Pérez Valle MD;  Location: [facility] OR     LAPAROSCOPIC CHOLECYSTECTOMY  9/12/2012    Procedure: LAPAROSCOPIC CHOLECYSTECTOMY;  Laparoscopic vs Open Cholecystectomy;  Surgeon: Esperanza Quinn MD;  Location: [facility]     LASER HOLMIUM LITHOTRIPSY URETER(S), INSERT STENT, COMBINED  11/2/2012    Procedure: COMBINED CYSTOSCOPY, URETEROSCOPY, LASER HOLMIUM LITHOTRIPSY  URETER(S), INSERT STENT;  Cystoscopy, Right Ureteroscopy Holmium Laser Lithotripsy , right ureteral stent placement *Latex Safe*;  Surgeon: Joshua Rm MD;  Location: [facility] OR     SURGICAL HISTORY OF -        section     SURGICAL HISTORY OF -       Tubal ligation     SURGICAL HISTORY OF -       Post hip fracture     SURGICAL HISTORY OF -       hardware removal, right iliac crest      VERTEBROPLASTY       Current Outpatient Medications   Medication Sig Dispense Refill     albuterol (PROAIR HFA/PROVENTIL HFA/VENTOLIN HFA) 108 (90 Base) MCG/ACT inhaler Inhale 2 puffs into the lungs every 6 hours as needed for shortness of breath / dyspnea or wheezing 1 Inhaler 3     alendronate (FOSAMAX) 70 MG tablet Take 70 mg by mouth every 7 days       buPROPion (WELLBUTRIN SR) 150 MG 12 hr tablet Take 1 tablet (150 mg) by mouth 2 times daily 180 tablet 3     cyclobenzaprine (FLEXERIL) 10 MG tablet TAKE ONE TABLET BY MOUTH THREE TIMES A DAY FOR 10 DAYS  0     Ergocalciferol (VITAMIN D2) 2000 units CAPS Take 2,000 Units by mouth daily       fluticasone-salmeterol (ADVAIR) 100-50 MCG/DOSE diskus inhaler Inhale 1 puff into the lungs 2 times daily 2 Inhaler 10     hydrOXYzine (ATARAX) 25 MG tablet Take 25-50 mg by mouth 3 times daily as needed for itching       oxyCODONE (ROXICODONE) 5 MG tablet Take 1 tablet (5 mg) by mouth every 8 hours as needed for pain 10 tablet 0     pramipexole (MIRAPEX) 1.5 MG tablet Take 1 tablet (1.5 mg) by mouth every morning AND 2 tablets (3 mg) At Bedtime. 90 tablet 3     terbinafine (LAMISIL) 250 MG tablet Take 1 tablet (250 mg) by mouth daily 90 tablet 0     topiramate (TOPAMAX) 25 MG tablet Take 25 mg by mouth 2 times daily Take 1 tab daily for 3 days, then twice a day, then 2 tabs twice a day       order for DME Equipment being ordered: CPAP       OTC products: tylenol     Allergies   Allergen Reactions     Ambien [Zolpidem Tartrate]      Mental status changes     Asa  "[Aspirin]      Accidental overdose.     Celexa [Citalopram Hydrobromide]      review with pt., i cant' find side effect in chart. 06 Sylvie Mederos MD       Doxychel [Doxycycline Hyclate]      Gabapentin      Rectalurgency      Tape [Adhesive Tape]      Zoloft      Wt gain       Latex Allergy: NO    Social History     Tobacco Use     Smoking status: Former Smoker     Packs/day: 0.50     Types: Cigarettes     Last attempt to quit: 2018     Years since quittin.8     Smokeless tobacco: Never Used   Substance Use Topics     Alcohol use: No     Alcohol/week: 0.0 oz     History   Drug Use No       REVIEW OF SYSTEMS:   Constitutional, HEENT, cardiovascular, pulmonary, gi and gu systems are negative, except as otherwise noted.    EXAM:   /68   Pulse 98   Temp 99.2  F (37.3  C) (Tympanic)   Resp 12   Ht 1.556 m (5' 1.25\")   Wt 78.9 kg (174 lb)   SpO2 96%   BMI 32.61 kg/m      GENERAL APPEARANCE: healthy, alert and no distress     EYES: EOMI, PERRL     HENT: ear canals and TM's normal and nose and mouth without ulcers or lesions     NECK: no adenopathy, no asymmetry, masses, or scars and thyroid normal to palpation     RESP: lungs clear to auscultation - no rales, rhonchi or wheezes     CV: regular rates and rhythm, normal S1 S2, no S3 or S4 and no murmur, click or rub     ABDOMEN:  soft, nontender, no HSM or masses and bowel sounds normal     MS: extremities normal- no gross deformities noted, no evidence of inflammation in joints, FROM in all extremities.     SKIN: no suspicious lesions or rashes     NEURO: Normal strength and tone, sensory exam grossly normal, mentation intact and speech normal     PSYCH: mentation appears normal. and affect normal/bright     LYMPHATICS: No cervical adenopathy    DIAGNOSTICS:     EKG: Not indicated due to non-vascular surgery and low risk of event (age <65 and without cardiac risk factors)  Labs Resulted Today:   Results for orders placed or performed in visit on " 07/26/19   CBC with platelets   Result Value Ref Range    WBC 7.4 4.0 - 11.0 10e9/L    RBC Count 4.14 3.8 - 5.2 10e12/L    Hemoglobin 11.9 11.7 - 15.7 g/dL    Hematocrit 37.3 35.0 - 47.0 %    MCV 90 78 - 100 fl    MCH 28.7 26.5 - 33.0 pg    MCHC 31.9 31.5 - 36.5 g/dL    RDW 15.6 (H) 10.0 - 15.0 %    Platelet Count 239 150 - 450 10e9/L   Comprehensive metabolic panel   Result Value Ref Range    Sodium 143 133 - 144 mmol/L    Potassium 3.4 3.4 - 5.3 mmol/L    Chloride 109 94 - 109 mmol/L    Carbon Dioxide 27 20 - 32 mmol/L    Anion Gap 7 3 - 14 mmol/L    Glucose 92 70 - 99 mg/dL    Urea Nitrogen 12 7 - 30 mg/dL    Creatinine 0.63 0.52 - 1.04 mg/dL    GFR Estimate >90 >60 mL/min/[1.73_m2]    GFR Estimate If Black >90 >60 mL/min/[1.73_m2]    Calcium 8.6 8.5 - 10.1 mg/dL    Bilirubin Total 0.3 0.2 - 1.3 mg/dL    Albumin 3.5 3.4 - 5.0 g/dL    Protein Total 7.2 6.8 - 8.8 g/dL    Alkaline Phosphatase 93 40 - 150 U/L    ALT 24 0 - 50 U/L    AST 17 0 - 45 U/L       IMPRESSION:   Reason for surgery/procedure: Spinal cord stimulator  Diagnosis/reason for consult: pre op clearance     The proposed surgical procedure is considered INTERMEDIATE risk.    REVISED CARDIAC RISK INDEX  The patient has the following serious cardiovascular risks for perioperative complications such as (MI, PE, VFib and 3  AV Block):  No serious cardiac risks  INTERPRETATION: 0 risks: Class I (very low risk - 0.4% complication rate)    The patient has the following additional risks for perioperative complications:  No identified additional risks      ICD-10-CM    1. Preop general physical exam Z01.818    2. S/P lumbar fusion Z98.1 oxyCODONE (ROXICODONE) 5 MG tablet   3. Chronic bilateral low back pain without sciatica M54.5 oxyCODONE (ROXICODONE) 5 MG tablet    G89.29    4. Restless legs syndrome (RLS) G25.81 CBC with platelets   5. Onychia of toe, left L03.032 Comprehensive metabolic panel   6. Chronic obstructive pulmonary disease, unspecified COPD type  (H) J44.9    7. History of alcoholism (H) F10.21        RECOMMENDATIONS:     --Patient is to hold all scheduled medications on the day of surgery EXCEPT for modifications listed below.    APPROVAL GIVEN to proceed with proposed procedure, without further diagnostic evaluation       Signed Electronically by: Ana Paula Mccray NP    Copy of this evaluation report is provided to requesting physician.    [place] Preop Guidelines    Revised Cardiac Risk Index

## 2019-07-26 NOTE — NURSING NOTE
"Chief Complaint   Patient presents with     Pre-Op Exam       Initial /68   Pulse 98   Temp 99.2  F (37.3  C) (Tympanic)   Resp 12   Ht 1.556 m (5' 1.25\")   Wt 78.9 kg (174 lb)   SpO2 96%   BMI 32.61 kg/m   Estimated body mass index is 32.61 kg/m  as calculated from the following:    Height as of this encounter: 1.556 m (5' 1.25\").    Weight as of this encounter: 78.9 kg (174 lb).    Patient presents to the clinic using No DME    Health Maintenance that is potentially due pending provider review:  NONE    n/a    Is there anyone who you would like to be able to receive your results? No  If yes have patient fill out JASIEL    "

## 2019-09-17 ENCOUNTER — OFFICE VISIT (OUTPATIENT)
Dept: FAMILY MEDICINE | Facility: CLINIC | Age: 59
End: 2019-09-17
Payer: MEDICARE

## 2019-09-17 VITALS
DIASTOLIC BLOOD PRESSURE: 84 MMHG | BODY MASS INDEX: 31.75 KG/M2 | HEART RATE: 91 BPM | WEIGHT: 169.4 LBS | RESPIRATION RATE: 12 BRPM | SYSTOLIC BLOOD PRESSURE: 118 MMHG | OXYGEN SATURATION: 97 %

## 2019-09-17 DIAGNOSIS — G25.81 RESTLESS LEGS SYNDROME (RLS): ICD-10-CM

## 2019-09-17 DIAGNOSIS — R60.0 LOCALIZED EDEMA: ICD-10-CM

## 2019-09-17 DIAGNOSIS — R06.02 SHORTNESS OF BREATH: Primary | ICD-10-CM

## 2019-09-17 DIAGNOSIS — G47.33 OSA (OBSTRUCTIVE SLEEP APNEA): Chronic | ICD-10-CM

## 2019-09-17 LAB
ALBUMIN SERPL-MCNC: 3.9 G/DL (ref 3.4–5)
ALP SERPL-CCNC: 77 U/L (ref 40–150)
ALT SERPL W P-5'-P-CCNC: 22 U/L (ref 0–50)
ANION GAP SERPL CALCULATED.3IONS-SCNC: 6 MMOL/L (ref 3–14)
AST SERPL W P-5'-P-CCNC: 18 U/L (ref 0–45)
BASOPHILS # BLD AUTO: 0 10E9/L (ref 0–0.2)
BASOPHILS NFR BLD AUTO: 0.5 %
BILIRUB SERPL-MCNC: 0.4 MG/DL (ref 0.2–1.3)
BUN SERPL-MCNC: 19 MG/DL (ref 7–30)
CALCIUM SERPL-MCNC: 9.4 MG/DL (ref 8.5–10.1)
CHLORIDE SERPL-SCNC: 109 MMOL/L (ref 94–109)
CO2 SERPL-SCNC: 23 MMOL/L (ref 20–32)
CREAT SERPL-MCNC: 0.63 MG/DL (ref 0.52–1.04)
DIFFERENTIAL METHOD BLD: ABNORMAL
EOSINOPHIL # BLD AUTO: 0.1 10E9/L (ref 0–0.7)
EOSINOPHIL NFR BLD AUTO: 1.7 %
ERYTHROCYTE [DISTWIDTH] IN BLOOD BY AUTOMATED COUNT: 15.2 % (ref 10–15)
GFR SERPL CREATININE-BSD FRML MDRD: >90 ML/MIN/{1.73_M2}
GLUCOSE SERPL-MCNC: 96 MG/DL (ref 70–99)
HCT VFR BLD AUTO: 38 % (ref 35–47)
HGB BLD-MCNC: 12.1 G/DL (ref 11.7–15.7)
LYMPHOCYTES # BLD AUTO: 2.3 10E9/L (ref 0.8–5.3)
LYMPHOCYTES NFR BLD AUTO: 30 %
MCH RBC QN AUTO: 29.2 PG (ref 26.5–33)
MCHC RBC AUTO-ENTMCNC: 31.8 G/DL (ref 31.5–36.5)
MCV RBC AUTO: 92 FL (ref 78–100)
MONOCYTES # BLD AUTO: 0.5 10E9/L (ref 0–1.3)
MONOCYTES NFR BLD AUTO: 6.3 %
NEUTROPHILS # BLD AUTO: 4.8 10E9/L (ref 1.6–8.3)
NEUTROPHILS NFR BLD AUTO: 61.5 %
PLATELET # BLD AUTO: 259 10E9/L (ref 150–450)
POTASSIUM SERPL-SCNC: 3.7 MMOL/L (ref 3.4–5.3)
PROT SERPL-MCNC: 7.7 G/DL (ref 6.8–8.8)
RBC # BLD AUTO: 4.14 10E12/L (ref 3.8–5.2)
SODIUM SERPL-SCNC: 138 MMOL/L (ref 133–144)
TSH SERPL DL<=0.005 MIU/L-ACNC: 0.68 MU/L (ref 0.4–4)
WBC # BLD AUTO: 7.8 10E9/L (ref 4–11)

## 2019-09-17 PROCEDURE — 99214 OFFICE O/P EST MOD 30 MIN: CPT | Performed by: NURSE PRACTITIONER

## 2019-09-17 PROCEDURE — 85025 COMPLETE CBC W/AUTO DIFF WBC: CPT | Performed by: NURSE PRACTITIONER

## 2019-09-17 PROCEDURE — 36415 COLL VENOUS BLD VENIPUNCTURE: CPT | Performed by: NURSE PRACTITIONER

## 2019-09-17 PROCEDURE — 84443 ASSAY THYROID STIM HORMONE: CPT | Performed by: NURSE PRACTITIONER

## 2019-09-17 PROCEDURE — 93000 ELECTROCARDIOGRAM COMPLETE: CPT | Performed by: NURSE PRACTITIONER

## 2019-09-17 PROCEDURE — 80053 COMPREHEN METABOLIC PANEL: CPT | Performed by: NURSE PRACTITIONER

## 2019-09-17 RX ORDER — PHENOL 1.4 %
600 AEROSOL, SPRAY (ML) MUCOUS MEMBRANE
COMMUNITY
End: 2024-01-23

## 2019-09-17 ASSESSMENT — PAIN SCALES - GENERAL: PAINLEVEL: MODERATE PAIN (5)

## 2019-09-17 NOTE — PROGRESS NOTES
SUBJECTIVE   Vesta KAYCEE Michael is a 58 year old female who presents with     Needs referral to sleep study  Smart watch states she has only slept for 9 hours over the last 3 days  Takes cpap off at night  Wants to go to [facility] for this    Edema bilateral lower leg edema- has noticed this for past months   Left worse than right    Wt Readings from Last 10 Encounters:   09/17/19 76.8 kg (169 lb 6.4 oz)   07/26/19 78.9 kg (174 lb)   07/05/19 77.6 kg (171 lb)   05/10/19 83 kg (183 lb)   03/21/19 79.8 kg (176 lb)   03/18/19 79.8 kg (176 lb)   03/01/19 75.8 kg (167 lb)   10/12/18 71.7 kg (158 lb)   09/10/18 68.4 kg (150 lb 12.8 oz)   05/09/18 73 kg (161 lb)     Denies chest pain   Feels some shortness of breath with activity and at rest         PCP   Ana Paula Mccray [phone]    Health Maintenance        Health Maintenance Due   Topic Date Due     URINE DRUG SCREEN  1960     HIV SCREENING  12/17/1976     MEDICARE ANNUAL WELLNESS VISIT  12/17/1979     ZOSTER IMMUNIZATION (1 of 2) 12/17/2011     DTAP/TDAP/TD IMMUNIZATION (2 - Td) 04/07/2019     INFLUENZA VACCINE (1) 09/01/2019     ANGELA ASSESSMENT  09/10/2019       HPI        Patient Active Problem List   Diagnosis     Brachial neuritis or radiculitis     Osteoporosis     Displacement of lumbar intervertebral disc without myelopathy     Restless legs syndrome (RLS)     Disturbance in sleep behavior     Displacement of cervical intervertebral disc without myelopathy     Anxiety     CARDIOVASCULAR SCREENING; LDL GOAL LESS THAN 130     Tobacco user     Family history of breast cancer in mother     Vitamin D deficiency     Chronic cholecystitis     H/O of right ureteral stone     Thyroid nodule; on outsie dc,due for f/u 5/13     H/O vertebroplasty     COPD (chronic obstructive pulmonary disease); spirometry 3/10 ; rare use of mdi     Graves disease     S/P cholecystectomy     Major depressive disorder, recurrent episode, moderate (H)     Advance Care Planning     Colitis      Compression fracture     GERD (gastroesophageal reflux disease)     H/O: hysterectomy     Lumbar stenosis     CATALINA (obstructive sleep apnea)     Sleep apnea     S/P lumbar fusion     Spondylolisthesis of lumbar region     History of alcoholism (H)     Current Outpatient Medications   Medication     pramipexole (MIRAPEX) 1.5 MG tablet     albuterol (PROAIR HFA/PROVENTIL HFA/VENTOLIN HFA) 108 (90 Base) MCG/ACT inhaler     alendronate (FOSAMAX) 70 MG tablet     buPROPion (WELLBUTRIN SR) 150 MG 12 hr tablet     calcium carbonate (CALCIUM CARBONATE) 600 MG tablet     cyclobenzaprine (FLEXERIL) 10 MG tablet     Ergocalciferol (VITAMIN D2) 2000 units CAPS     fluticasone-salmeterol (ADVAIR) 100-50 MCG/DOSE diskus inhaler     hydrOXYzine (ATARAX) 25 MG tablet     order for DME     oxyCODONE (ROXICODONE) 5 MG tablet     terbinafine (LAMISIL) 250 MG tablet     topiramate (TOPAMAX) 25 MG tablet     No current facility-administered medications for this visit.          Reviewed and updated:  Tobacco  Allergies  Meds  Med Hx  Surg Hx  Fam Hx  Soc Hx     ROS:  Constitutional, HEENT, cardiovascular, pulmonary, gi and gu systems are negative, except as otherwise noted.    PHYSICAL EXAM   /84   Pulse 91   Resp 12   Wt 76.8 kg (169 lb 6.4 oz)   SpO2 97%   BMI 31.75 kg/m    Body mass index is 31.75 kg/m .  GENERAL: healthy, alert and no distress  NECK: no adenopathy, no asymmetry, masses, or scars and thyroid normal to palpation  RESP: lungs clear to auscultation - no rales, rhonchi or wheezes  CV: regular rate and rhythm, normal S1 S2, no S3 or S4, no murmur, click or rub, tracel edema and peripheral pulses strong  ABDOMEN: soft, nontender, no hepatosplenomegaly, no masses and bowel sounds normal  MS: no gross musculoskeletal defects noted    Assessment & Plan     1. Shortness of breath  2. Localized edema    Will check labs as below   Recommend echocardiogram   - EKG 12-lead complete w/read - Clinics  -  Comprehensive metabolic panel  - TSH with free T4 reflex  - CBC with platelets differential  - N terminal pro BNP outpatient; Future  - SLEEP EVALUATION & MANAGEMENT REFERRAL - ADULT -Other (Respond in commments) ([facility]); Future  - Echocardiogram Complete; Future      3. Restless legs syndrome (RLS)  4. CATALINA (obstructive sleep apnea)  Recommend getting updated sleep study as noted tolerating CPAP is taking it off at night   Referral placed   - SLEEP EVALUATION & MANAGEMENT REFERRAL - ADULT -Other (Respond in commments) ([facility]); Future        Patient Instructions   EKG is unchanged from previous screen   Labs completed   Referral placed for sleep study- as below   Recommended getting updated echocardiogram   This is done in [place] call [phone] to schedule     Follow up after echocardiogram       Return in about 4 weeks (around 10/15/2019) for Routine Visit.    Ana Paula Mccray NP  [facility]      Risks, benefits, side effects and rationale for treatment plan fully discussed with the patient and understanding expressed.

## 2019-09-17 NOTE — PATIENT INSTRUCTIONS
EKG is unchanged from previous screen   Labs completed   Referral placed for sleep study- as below   Recommended getting updated echocardiogram   This is done in [place] call [phone] to schedule     Follow up after echocardiogram

## 2019-09-17 NOTE — NURSING NOTE
"Chief Complaint   Patient presents with     Sleep Study     referral     Edema     lower legs     /84   Pulse 91   Resp 12   Wt 76.8 kg (169 lb 6.4 oz)   SpO2 97%   BMI 31.75 kg/m   Estimated body mass index is 31.75 kg/m  as calculated from the following:    Height as of 7/26/19: 1.556 m (5' 1.25\").    Weight as of this encounter: 76.8 kg (169 lb 6.4 oz).  Patient presents to the clinic using No DME      Health Maintenance that is potentially due pending provider review:    Health Maintenance Due   Topic Date Due     URINE DRUG SCREEN  1960     HIV SCREENING  12/17/1976     MEDICARE ANNUAL WELLNESS VISIT  12/17/1979     ZOSTER IMMUNIZATION (1 of 2) 12/17/2011     DTAP/TDAP/TD IMMUNIZATION (2 - Td) 04/07/2019     INFLUENZA VACCINE (1) 09/01/2019     ANGELA ASSESSMENT  09/10/2019        n/a        "

## 2019-09-19 ENCOUNTER — TRANSFERRED RECORDS (OUTPATIENT)
Dept: HEALTH INFORMATION MANAGEMENT | Facility: CLINIC | Age: 59
End: 2019-09-19

## 2019-09-20 ENCOUNTER — HOSPITAL ENCOUNTER (OUTPATIENT)
Dept: CARDIOLOGY | Facility: CLINIC | Age: 59
Discharge: HOME | End: 2019-09-20
Attending: NURSE PRACTITIONER | Admitting: NURSE PRACTITIONER
Payer: MEDICARE

## 2019-09-20 DIAGNOSIS — R60.0 LOCALIZED EDEMA: ICD-10-CM

## 2019-09-20 DIAGNOSIS — R06.02 SHORTNESS OF BREATH: ICD-10-CM

## 2019-09-20 DIAGNOSIS — G47.33 OSA (OBSTRUCTIVE SLEEP APNEA): Chronic | ICD-10-CM

## 2019-09-20 PROCEDURE — 93306 TTE W/DOPPLER COMPLETE: CPT | Mod: 26 | Performed by: INTERNAL MEDICINE

## 2019-09-20 PROCEDURE — 93306 TTE W/DOPPLER COMPLETE: CPT

## 2019-10-09 ENCOUNTER — OFFICE VISIT - HEALTHEAST (OUTPATIENT)
Dept: RHEUMATOLOGY | Facility: CLINIC | Age: 59
End: 2019-10-09

## 2019-10-09 DIAGNOSIS — M79.10 MYALGIA: ICD-10-CM

## 2019-10-09 DIAGNOSIS — R76.8 ANA POSITIVE: ICD-10-CM

## 2019-10-09 LAB
ALBUMIN SERPL-MCNC: 3.6 G/DL (ref 3.5–5)
ALT SERPL W P-5'-P-CCNC: 12 U/L (ref 0–45)
BASOPHILS # BLD AUTO: 0 THOU/UL (ref 0–0.2)
BASOPHILS NFR BLD AUTO: 0 % (ref 0–2)
C REACTIVE PROTEIN LHE: 0.7 MG/DL (ref 0–0.8)
C3 SERPL-MCNC: 113 MG/DL (ref 83–177)
C4 SERPL-MCNC: 23 MG/DL (ref 19–59)
CK SERPL-CCNC: 86 U/L (ref 30–190)
CREAT SERPL-MCNC: 0.75 MG/DL (ref 0.6–1.1)
EOSINOPHIL # BLD AUTO: 0.1 THOU/UL (ref 0–0.4)
EOSINOPHIL NFR BLD AUTO: 2 % (ref 0–6)
ERYTHROCYTE [DISTWIDTH] IN BLOOD BY AUTOMATED COUNT: 13 % (ref 11–14.5)
ERYTHROCYTE [SEDIMENTATION RATE] IN BLOOD BY WESTERGREN METHOD: 19 MM/HR (ref 0–20)
GFR SERPL CREATININE-BSD FRML MDRD: >60 ML/MIN/1.73M2
HCT VFR BLD AUTO: 34.4 % (ref 35–47)
HGB BLD-MCNC: 11.4 G/DL (ref 12–16)
LYMPHOCYTES # BLD AUTO: 1.9 THOU/UL (ref 0.8–4.4)
LYMPHOCYTES NFR BLD AUTO: 29 % (ref 20–40)
MCH RBC QN AUTO: 29.5 PG (ref 27–34)
MCHC RBC AUTO-ENTMCNC: 33.3 G/DL (ref 32–36)
MCV RBC AUTO: 89 FL (ref 80–100)
MONOCYTES # BLD AUTO: 0.4 THOU/UL (ref 0–0.9)
MONOCYTES NFR BLD AUTO: 6 % (ref 2–10)
NEUTROPHILS # BLD AUTO: 4.2 THOU/UL (ref 2–7.7)
NEUTROPHILS NFR BLD AUTO: 63 % (ref 50–70)
PLATELET # BLD AUTO: 249 THOU/UL (ref 140–440)
PMV BLD AUTO: 7.7 FL (ref 7–10)
RBC # BLD AUTO: 3.88 MILL/UL (ref 3.8–5.4)
RHEUMATOID FACT SERPL-ACNC: <15 IU/ML (ref 0–30)
WBC: 6.6 THOU/UL (ref 4–11)

## 2019-10-09 ASSESSMENT — MIFFLIN-ST. JEOR: SCORE: 1297.1

## 2019-10-10 LAB
ANA SER QL: 0.5 U
CCP AB SER IA-ACNC: <0.5 U/ML

## 2019-11-02 ENCOUNTER — HEALTH MAINTENANCE LETTER (OUTPATIENT)
Age: 59
End: 2019-11-02

## 2019-11-15 ENCOUNTER — TRANSFERRED RECORDS (OUTPATIENT)
Dept: HEALTH INFORMATION MANAGEMENT | Facility: CLINIC | Age: 59
End: 2019-11-15

## 2019-11-25 ENCOUNTER — OFFICE VISIT (OUTPATIENT)
Dept: FAMILY MEDICINE | Facility: CLINIC | Age: 59
End: 2019-11-25
Payer: MEDICARE

## 2019-11-25 VITALS
HEART RATE: 90 BPM | SYSTOLIC BLOOD PRESSURE: 139 MMHG | OXYGEN SATURATION: 99 % | TEMPERATURE: 98.2 F | BODY MASS INDEX: 29.27 KG/M2 | DIASTOLIC BLOOD PRESSURE: 88 MMHG | RESPIRATION RATE: 12 BRPM | WEIGHT: 156.2 LBS

## 2019-11-25 DIAGNOSIS — R63.4 WEIGHT LOSS: Primary | ICD-10-CM

## 2019-11-25 DIAGNOSIS — F43.9 SITUATIONAL STRESS: ICD-10-CM

## 2019-11-25 LAB
ALBUMIN SERPL-MCNC: 3.8 G/DL (ref 3.4–5)
ALP SERPL-CCNC: 72 U/L (ref 40–150)
ALT SERPL W P-5'-P-CCNC: 21 U/L (ref 0–50)
ANION GAP SERPL CALCULATED.3IONS-SCNC: 4 MMOL/L (ref 3–14)
AST SERPL W P-5'-P-CCNC: 15 U/L (ref 0–45)
BILIRUB SERPL-MCNC: 0.3 MG/DL (ref 0.2–1.3)
BUN SERPL-MCNC: 15 MG/DL (ref 7–30)
CALCIUM SERPL-MCNC: 9.1 MG/DL (ref 8.5–10.1)
CHLORIDE SERPL-SCNC: 113 MMOL/L (ref 94–109)
CO2 SERPL-SCNC: 28 MMOL/L (ref 20–32)
CREAT SERPL-MCNC: 0.68 MG/DL (ref 0.52–1.04)
ERYTHROCYTE [DISTWIDTH] IN BLOOD BY AUTOMATED COUNT: 15.3 % (ref 10–15)
GFR SERPL CREATININE-BSD FRML MDRD: >90 ML/MIN/{1.73_M2}
GLUCOSE SERPL-MCNC: 90 MG/DL (ref 70–99)
HCT VFR BLD AUTO: 39.5 % (ref 35–47)
HGB BLD-MCNC: 12.7 G/DL (ref 11.7–15.7)
MCH RBC QN AUTO: 29.3 PG (ref 26.5–33)
MCHC RBC AUTO-ENTMCNC: 32.2 G/DL (ref 31.5–36.5)
MCV RBC AUTO: 91 FL (ref 78–100)
PLATELET # BLD AUTO: 272 10E9/L (ref 150–450)
POTASSIUM SERPL-SCNC: 2.9 MMOL/L (ref 3.4–5.3)
PROT SERPL-MCNC: 7.5 G/DL (ref 6.8–8.8)
RBC # BLD AUTO: 4.33 10E12/L (ref 3.8–5.2)
SODIUM SERPL-SCNC: 145 MMOL/L (ref 133–144)
T4 FREE SERPL-MCNC: 1.09 NG/DL (ref 0.76–1.46)
TSH SERPL DL<=0.005 MIU/L-ACNC: 0.18 MU/L (ref 0.4–4)
WBC # BLD AUTO: 6.7 10E9/L (ref 4–11)

## 2019-11-25 PROCEDURE — 84439 ASSAY OF FREE THYROXINE: CPT | Performed by: NURSE PRACTITIONER

## 2019-11-25 PROCEDURE — 80053 COMPREHEN METABOLIC PANEL: CPT | Performed by: NURSE PRACTITIONER

## 2019-11-25 PROCEDURE — 84443 ASSAY THYROID STIM HORMONE: CPT | Performed by: NURSE PRACTITIONER

## 2019-11-25 PROCEDURE — 85027 COMPLETE CBC AUTOMATED: CPT | Performed by: NURSE PRACTITIONER

## 2019-11-25 PROCEDURE — 36415 COLL VENOUS BLD VENIPUNCTURE: CPT | Performed by: NURSE PRACTITIONER

## 2019-11-25 PROCEDURE — 99213 OFFICE O/P EST LOW 20 MIN: CPT | Performed by: NURSE PRACTITIONER

## 2019-11-25 ASSESSMENT — PAIN SCALES - GENERAL: PAINLEVEL: SEVERE PAIN (7)

## 2019-11-25 NOTE — PATIENT INSTRUCTIONS
Will call with lab results tomorrow      Print out for sleep study was provided     Continue counseling   I am here for you if these is more bothersome

## 2019-11-25 NOTE — PROGRESS NOTES
"SUBJECTIVE   Vesta A Michael is a 58 year old female who presents with     Need paper copy for sleep study - going to have done in [place]    Weight loss  Has been going through a lot of stuff    Wt Readings from Last 10 Encounters:   11/25/19 70.9 kg (156 lb 3.2 oz)   09/17/19 76.8 kg (169 lb 6.4 oz)   07/26/19 78.9 kg (174 lb)   07/05/19 77.6 kg (171 lb)   05/10/19 83 kg (183 lb)   03/21/19 79.8 kg (176 lb)   03/18/19 79.8 kg (176 lb)   03/01/19 75.8 kg (167 lb)   10/12/18 71.7 kg (158 lb)   09/10/18 68.4 kg (150 lb 12.8 oz)     Under a lot of stress   Her and boyfriend fo 8 years broke up  He cheated on her   But a \"bug\" in her phone- she lost all her info  Counselor- Sanjana Da Silva- [facility]   Every 2 weeks   Denies feeling depressed   Feels safe   Denies thoughts of self harm       PCP   Ana Paula Mccray, FLAVIA [phone]    Health Maintenance        Health Maintenance Due   Topic Date Due     URINE DRUG SCREEN  1960     HIV SCREENING  12/17/1976     MEDICARE ANNUAL WELLNESS VISIT  12/17/1979     ZOSTER IMMUNIZATION (1 of 2) 12/17/2011     DTAP/TDAP/TD IMMUNIZATION (2 - Td) 04/07/2019     INFLUENZA VACCINE (1) 09/01/2019     ANGELA ASSESSMENT  09/10/2019       HPI        Patient Active Problem List   Diagnosis     Brachial neuritis or radiculitis     Osteoporosis     Displacement of lumbar intervertebral disc without myelopathy     Restless legs syndrome (RLS)     Disturbance in sleep behavior     Displacement of cervical intervertebral disc without myelopathy     Anxiety     CARDIOVASCULAR SCREENING; LDL GOAL LESS THAN 130     Tobacco user     Family history of breast cancer in mother     Vitamin D deficiency     Chronic cholecystitis     H/O of right ureteral stone     Thyroid nodule; on outsie dc,due for f/u 5/13     H/O vertebroplasty     COPD (chronic obstructive pulmonary disease); spirometry 3/10 ; rare use of mdi     Graves disease     S/P cholecystectomy     Major depressive disorder, recurrent " episode, moderate (H)     Advance Care Planning     Colitis     Compression fracture     GERD (gastroesophageal reflux disease)     H/O: hysterectomy     Lumbar stenosis     CATALINA (obstructive sleep apnea)     Sleep apnea     S/P lumbar fusion     Spondylolisthesis of lumbar region     History of alcoholism (H)     Current Outpatient Medications   Medication     pramipexole (MIRAPEX) 1.5 MG tablet     alendronate (FOSAMAX) 70 MG tablet     calcium carbonate (CALCIUM CARBONATE) 600 MG tablet     cyclobenzaprine (FLEXERIL) 10 MG tablet     Ergocalciferol (VITAMIN D2) 2000 units CAPS     hydrOXYzine (ATARAX) 25 MG tablet     order for DME     topiramate (TOPAMAX) 25 MG tablet     No current facility-administered medications for this visit.        Patient Active Problem List   Diagnosis     Brachial neuritis or radiculitis     Osteoporosis     Displacement of lumbar intervertebral disc without myelopathy     Restless legs syndrome (RLS)     Disturbance in sleep behavior     Displacement of cervical intervertebral disc without myelopathy     Anxiety     CARDIOVASCULAR SCREENING; LDL GOAL LESS THAN 130     Tobacco user     Family history of breast cancer in mother     Vitamin D deficiency     Chronic cholecystitis     H/O of right ureteral stone     Thyroid nodule; on outsie dc,due for f/u 5/13     H/O vertebroplasty     COPD (chronic obstructive pulmonary disease); spirometry 3/10 ; rare use of mdi     Graves disease     S/P cholecystectomy     Major depressive disorder, recurrent episode, moderate (H)     Advance Care Planning     Colitis     Compression fracture     GERD (gastroesophageal reflux disease)     H/O: hysterectomy     Lumbar stenosis     CATALINA (obstructive sleep apnea)     Sleep apnea     S/P lumbar fusion     Spondylolisthesis of lumbar region     History of alcoholism (H)     Past Surgical History:   Procedure Laterality Date     FUSION CERVICAL ANTERIOR TWO LEVELS      C 5-7     FUSION LUMBAR ANTERIOR THREE+  LEVELS      L3- SI     HYSTERECTOMY, DEYSI      no oophorectomy     INJECT EPIDURAL TRANSFORAMINAL Bilateral 3/9/2017    Procedure: INJECT EPIDURAL TRANSFORAMINAL;  Surgeon: Pérez Valle MD;  Location: [facility]     INJECT EPIDURAL TRANSFORAMINAL N/A 2018    Procedure: INJECT EPIDURAL TRANSFORAMINAL;  transforaminal epidural steroid injection, thoracic 12 - lumbar 1;  Surgeon: Pérez Valle MD;  Location: [facility]     LAPAROSCOPIC CHOLECYSTECTOMY  2012    Procedure: LAPAROSCOPIC CHOLECYSTECTOMY;  Laparoscopic vs Open Cholecystectomy;  Surgeon: Esperanza Quinn MD;  Location: [facility]     LASER HOLMIUM LITHOTRIPSY URETER(S), INSERT STENT, COMBINED  2012    Procedure: COMBINED CYSTOSCOPY, URETEROSCOPY, LASER HOLMIUM LITHOTRIPSY URETER(S), INSERT STENT;  Cystoscopy, Right Ureteroscopy Holmium Laser Lithotripsy , right ureteral stent placement *Latex Safe*;  Surgeon: Joshua Rm MD;  Location: [facility]     SURGICAL HISTORY OF -        section     SURGICAL HISTORY OF -       Tubal ligation     SURGICAL HISTORY OF -       Post hip fracture     SURGICAL HISTORY OF -       hardware removal, right iliac crest      VERTEBROPLASTY         Social History     Tobacco Use     Smoking status: Former Smoker     Packs/day: 0.50     Types: Cigarettes     Last attempt to quit: 2018     Years since quittin.1     Smokeless tobacco: Never Used   Substance Use Topics     Alcohol use: No     Alcohol/week: 0.0 standard drinks     Family History   Problem Relation Age of Onset     Heart Disease Brother      Breast Cancer Mother          age 31     Alcohol/Drug Other            Reviewed and updated:  Tobacco  Allergies  Meds  Med Hx  Surg Hx  Fam Hx  Soc Hx     ROS:  Constitutional, HEENT, cardiovascular, pulmonary, gi and gu systems are negative, except as otherwise noted.    PHYSICAL EXAM   /88   Pulse 90   Temp 98.2  F (36.8  C) (Tympanic)   Resp 12   Wt 70.9  kg (156 lb 3.2 oz)   SpO2 99%   BMI 29.27 kg/m    Body mass index is 29.27 kg/m .  GENERAL: healthy, alert and no distress  NECK: no adenopathy, no asymmetry, masses, or scars and thyroid normal to palpation  RESP: lungs clear to auscultation - no rales, rhonchi or wheezes  CV: regular rate and rhythm, normal S1 S2, no S3 or S4, no murmur, click or rub, no peripheral edema and peripheral pulses strong  ABDOMEN: soft, nontender, no hepatosplenomegaly, no masses and bowel sounds normal  MS: no gross musculoskeletal defects noted, no edema  PSYCH: mentation appears normal, affect normal/bright    Assessment & Plan     Weight loss  (primary encounter diagnosis)  Situational stress  Labs checked today   Suspect more related to situational stress with break up with boyfriend   - CBC with platelets  - Comprehensive metabolic panel  - TSH with free T4 reflex       Patient Instructions   Will call with lab results tomorrow      Print out for sleep study was provided     Continue counseling   I am here for you if these is more bothersome       Return in about 4 weeks (around 12/23/2019) for Routine Visit.    Ana Paula Mccray NP  [facility]      Risks, benefits, side effects and rationale for treatment plan fully discussed with the patient and understanding expressed.

## 2019-11-25 NOTE — NURSING NOTE
"Chief Complaint   Patient presents with     Referral     for sleep study     Weight Loss     /88   Pulse 90   Temp 98.2  F (36.8  C) (Tympanic)   Resp 12   Wt 70.9 kg (156 lb 3.2 oz)   SpO2 99%   BMI 29.27 kg/m   Estimated body mass index is 29.27 kg/m  as calculated from the following:    Height as of 7/26/19: 1.556 m (5' 1.25\").    Weight as of this encounter: 70.9 kg (156 lb 3.2 oz).  Patient presents to the clinic using No DME      Health Maintenance that is potentially due pending provider review:    Health Maintenance Due   Topic Date Due     URINE DRUG SCREEN  1960     HIV SCREENING  12/17/1976     MEDICARE ANNUAL WELLNESS VISIT  12/17/1979     ZOSTER IMMUNIZATION (1 of 2) 12/17/2011     DTAP/TDAP/TD IMMUNIZATION (2 - Td) 04/07/2019     INFLUENZA VACCINE (1) 09/01/2019     ANGELA ASSESSMENT  09/10/2019        n/a        "

## 2019-11-27 ENCOUNTER — TELEPHONE (OUTPATIENT)
Dept: FAMILY MEDICINE | Facility: CLINIC | Age: 59
End: 2019-11-27

## 2019-11-27 DIAGNOSIS — E87.6 HYPOKALEMIA: Primary | ICD-10-CM

## 2019-11-27 NOTE — TELEPHONE ENCOUNTER
Patient is calling looking for her lab results. Please advise.    Alivia Rodriguez-[facility] [place]

## 2019-11-27 NOTE — TELEPHONE ENCOUNTER
Routing to PCP to result the labs, thank you. Please see Potassium is 2.9 on 11-25-19, please advise.    LISSETT Martinez RN

## 2019-12-13 DIAGNOSIS — G25.81 RESTLESS LEG SYNDROME: ICD-10-CM

## 2019-12-14 NOTE — TELEPHONE ENCOUNTER
"Requested Prescriptions   Pending Prescriptions Disp Refills     pramipexole (MIRAPEX) 1.5 MG tablet [Pharmacy Med Name: PRAMIPEXOLE DIHYDROCHLORID 1.5 TABS] 90 tablet 3     Sig: TAKE ONE TABLET BY MOUTH EVERY MORNING AND TAKE TWO TABLETS BY MOUTH AT BEDTIME       Antiparkinson's Agents Protocol Passed - 12/13/2019  7:22 PM        Passed - Blood pressure under 140/90 in past 12 months     BP Readings from Last 3 Encounters:   11/25/19 139/88   09/17/19 118/84   07/26/19 112/68                 Passed - CBC on record in past 12 months     Recent Labs   Lab Test 11/25/19  1727   WBC 6.7   RBC 4.33   HGB 12.7   HCT 39.5                    Passed - ALT on record in past 12 months         Recent Labs   Lab Test 11/25/19  1727   ALT 21             Passed - Serum Creatinine on file in past 12 months     Recent Labs   Lab Test 11/25/19  1727   CR 0.68             Passed - Medication is active on med list        Passed - Patient is age 18 or older        Passed - No active pregnancy on record        Passed - No positive pregnancy test in the past 12 months        Passed - Recent (6 mo) or future (30 days) visit within the authorizing provider's specialty     Patient had office visit in the last 6 months or has a visit in the next 30 days with authorizing provider or within the authorizing provider's specialty.  See \"Patient Info\" tab in inbasket, or \"Choose Columns\" in Meds & Orders section of the refill encounter.            Last Written Prescription Date:  5/10/19  Last Fill Quantity: 90,  # refills: 3   Last office visit: 11/25/2019 with prescribing provider:     Future Office Visit:      "

## 2019-12-16 RX ORDER — PRAMIPEXOLE DIHYDROCHLORIDE 1.5 MG/1
TABLET ORAL
Qty: 90 TABLET | Refills: 3 | Status: SHIPPED | OUTPATIENT
Start: 2019-12-16 | End: 2020-07-20

## 2019-12-27 ENCOUNTER — OFFICE VISIT (OUTPATIENT)
Dept: FAMILY MEDICINE | Facility: CLINIC | Age: 59
End: 2019-12-27
Payer: MEDICARE

## 2019-12-27 ENCOUNTER — TELEPHONE (OUTPATIENT)
Dept: FAMILY MEDICINE | Facility: CLINIC | Age: 59
End: 2019-12-27

## 2019-12-27 VITALS
HEART RATE: 81 BPM | RESPIRATION RATE: 20 BRPM | SYSTOLIC BLOOD PRESSURE: 124 MMHG | HEIGHT: 61 IN | OXYGEN SATURATION: 99 % | WEIGHT: 156 LBS | BODY MASS INDEX: 29.45 KG/M2 | TEMPERATURE: 98.3 F | DIASTOLIC BLOOD PRESSURE: 82 MMHG

## 2019-12-27 DIAGNOSIS — Z01.818 PREOP GENERAL PHYSICAL EXAM: Primary | ICD-10-CM

## 2019-12-27 DIAGNOSIS — M54.50 CHRONIC BILATERAL LOW BACK PAIN WITHOUT SCIATICA: ICD-10-CM

## 2019-12-27 DIAGNOSIS — E87.6 HYPOKALEMIA: ICD-10-CM

## 2019-12-27 DIAGNOSIS — G89.29 CHRONIC BILATERAL LOW BACK PAIN WITHOUT SCIATICA: ICD-10-CM

## 2019-12-27 DIAGNOSIS — R10.9 RIGHT FLANK PAIN: ICD-10-CM

## 2019-12-27 LAB
ALBUMIN UR-MCNC: NEGATIVE MG/DL
APPEARANCE UR: CLEAR
BILIRUB UR QL STRIP: NEGATIVE
COLOR UR AUTO: YELLOW
GLUCOSE UR STRIP-MCNC: NEGATIVE MG/DL
HGB UR QL STRIP: NEGATIVE
KETONES UR STRIP-MCNC: NEGATIVE MG/DL
LEUKOCYTE ESTERASE UR QL STRIP: NEGATIVE
NITRATE UR QL: NEGATIVE
PH UR STRIP: 5.5 PH (ref 5–7)
SOURCE: NORMAL
SP GR UR STRIP: 1.01 (ref 1–1.03)
UROBILINOGEN UR STRIP-ACNC: 0.2 EU/DL (ref 0.2–1)

## 2019-12-27 PROCEDURE — 99214 OFFICE O/P EST MOD 30 MIN: CPT | Performed by: NURSE PRACTITIONER

## 2019-12-27 PROCEDURE — 81003 URINALYSIS AUTO W/O SCOPE: CPT | Performed by: NURSE PRACTITIONER

## 2019-12-27 ASSESSMENT — MIFFLIN-ST. JEOR: SCORE: 1228.95

## 2019-12-27 NOTE — Clinical Note
PLease call patient. It looks like she is hospitalized for chest pain. I recommend that she cancel her surgery. I am unable to clear her for surgery as need to recheck after her hospitalization. She should schedule a follow up. Marian

## 2019-12-27 NOTE — PATIENT INSTRUCTIONS
Hold all medications the AM of surgery   OK to use tylenol for pain control hold any ibuprofen, naproxen Asprin 1 week prior to procedure   Will check urine today   Will call with lab results       Before Your Surgery      Call your surgeon if there is any change in your health. This includes signs of a cold or flu (such as a sore throat, runny nose, cough, rash or fever).    Do not smoke, drink alcohol or take over the counter medicine (unless your surgeon or primary care doctor tells you to) for the 24 hours before and after surgery.    If you take prescribed drugs: Follow your doctor s orders about which medicines to take and which to stop until after surgery.    Eating and drinking prior to surgery: follow the instructions from your surgeon    Take a shower or bath the night before surgery. Use the soap your surgeon gave you to gently clean your skin. If you do not have soap from your surgeon, use your regular soap. Do not shave or scrub the surgery site.  Wear clean pajamas and have clean sheets on your bed.

## 2019-12-27 NOTE — PROGRESS NOTES
[address] [address]  [phone]  Dept: [phone]    PRE-OP EVALUATION:  Today's date: 2019    Vesta Rodriguez (: 1960) presents for pre-operative evaluation assessment as requested by Dr. Sesar Hanson.  She requires evaluation and anesthesia risk assessment prior to undergoing surgery/procedure for treatment of back pain.    Fax number for surgical facility: [phone]  Primary Physician: Ana Paula Mccray  Type of Anesthesia Anticipated: General    Patient has a Health Care Directive or Living Will:  YES     Preop Questions 2019   Who is doing your surgery? Dr.David Richardson   What are you having done? Removal of neurostimulaor IPG and Leads    Date of Surgery/Procedure: 2020   Facility or Hospital where procedure/surgery will be performed: [facility]   1.  Do you have a history of Heart attack, stroke, stent, coronary bypass surgery, or other heart surgery? No   2.  Do you ever have any pain or discomfort in your chest? No   3.  Do you have a history of  Heart Failure? No   4.   Are you troubled by shortness of breath when:  walking on a level surface, or up a slight hill, or at night? YES - reports she has had this for years and denies any chest pain    5.  Do you currently have a cold, bronchitis or other respiratory infection? No   6.  Do you have a cough, shortness of breath, or wheezing? No   7.  Do you sometimes get pains in the calves of your legs when you walk? No   8. Do you or anyone in your family have previous history of blood clots? No   9.  Do you or does anyone in your family have a serious bleeding problem such as prolonged bleeding following surgeries or cuts? No   10. Have you ever had problems with anemia or been told to take iron pills? No   11. Have you had any abnormal blood loss such as black, tarry or bloody stools, or abnormal vaginal bleeding? No   12. Have you ever had a blood transfusion?  YES -  Years ago due to bleeding in esophagus    13. Have you or any of your relatives ever had problems with anesthesia? No   14. Do you have sleep apnea, excessive snoring or daytime drowsiness? YES - sleep apnea, unable to tolerate CPAP. Has a referral to see sleep specialist for further evaluation    15. Do you have any prosthetic heart valves? No   16. Do you have prosthetic joints? No   17. Is there any chance that you may be pregnant? No         HPI:     HPI related to upcoming procedure: Patient had a neurostimulator placed in August and she is scheduled to have it removed as it has not been helpful and it recently malfunctioned in that she was having issues with it  interchanging from therapy/MRI mode. She had this placed due to her chronic low back pain. This is being done at the [facility]. She is here today for pre operative clearance.       See problem list for active medical problems.  Problems all longstanding and stable, except as noted/documented.  See ROS for pertinent symptoms related to these conditions.      MEDICAL HISTORY:     Patient Active Problem List    Diagnosis Date Noted     History of alcoholism (H) 07/26/2019     Priority: Medium     Has been sober Nov 2014       CATALINA (obstructive sleep apnea) 05/09/2018     Priority: Medium     Sleep apnea 05/09/2018     Priority: Medium     Overview:   does not use CPAP due to insomnia       Spondylolisthesis of lumbar region 05/09/2018     Priority: Medium     Colitis 02/26/2018     Priority: Medium     S/P lumbar fusion 06/09/2017     Priority: Medium     Lumbar stenosis 05/30/2017     Priority: Medium     Advance Care Planning 03/08/2017     Priority: Medium     Advance Care Planning 7/5/2017: Recommend Code Status in chart and patient's Advance Care Planning documents be reviewed to ensure alignment with patient's current wishes. Added by Mary Kay Forman [facility] Advance Care Planning Liaison  Advance Care Planning 3/8/2017: ACP Review of  Chart / Resources Provided:  Reviewed chart for advance care plan.  Vesta KAYCEE Rodriguez has been provided information and resources to begin or update their advance care plan.  Added by Vesta Bradley       Major depressive disorder, recurrent episode, moderate (H) 10/24/2016     Priority: Medium     S/P cholecystectomy 08/04/2016     Priority: Medium     Graves disease 01/25/2016     Priority: Medium     H/O vertebroplasty 08/12/2013     Priority: Medium     Thyroid nodule; on outsie dc,due for f/u 5/13 04/19/2013     Priority: Medium     ultrasound may , 2013  Due for followup          Compression fracture 01/16/2013     Priority: Medium     GERD (gastroesophageal reflux disease) 01/16/2013     Priority: Medium     H/O of right ureteral stone 10/05/2012     Priority: Medium     8/1612         Chronic cholecystitis 09/04/2012     Priority: Medium     H/O: hysterectomy 08/19/2012     Priority: Medium     Tobacco user 08/28/2011     Priority: Medium     Family history of breast cancer in mother 08/28/2011     Priority: Medium     Vitamin D deficiency 08/28/2011     Priority: Medium     CARDIOVASCULAR SCREENING; LDL GOAL LESS THAN 130 10/31/2010     Priority: Medium     COPD (chronic obstructive pulmonary disease); spirometry 3/10 ; rare use of mdi 03/15/2010     Priority: Medium     Anxiety 09/26/2008     Priority: Medium     Disturbance in sleep behavior 10/08/2007     Priority: Medium     Has failed mult meds, - lunesta, ambien, klonopin  etoh treatment 2013  Not candidate for klonopin      Problem list name updated by automated process. Provider to review       Displacement of cervical intervertebral disc without myelopathy 10/08/2007     Priority: Medium     Cervical fusion. [facility].     2004       Restless legs syndrome (RLS) 07/30/2007     Priority: Medium     On mirapex, unable to go off     0.5 mg twice a day , for rls        Displacement of lumbar intervertebral disc without myelopathy 02/20/2006      Priority: Medium     2 disc fusion        Brachial neuritis or radiculitis 07/06/2005     Priority: Medium     Problem list name updated by automated process. Provider to review       Osteoporosis 07/06/2005     Priority: Medium     Hip fracture after bone graft.     forteo 3/09 - until summer 2010  Problem list name updated by automated process. Provider to review        Past Medical History:   Diagnosis Date     Anemia      Anemia due to blood loss, acute 10/21/2012     Anxiety      Back pain      Chronic cholecystitis      Degeneration of cervical intervertebral disc     04     Degeneration of lumbar or lumbosacral intervertebral disc      Major depression in remission (H)      Osteoporosis      Right ureteral stone      RLS (restless legs syndrome)      Sleep disturbance      UTI (urinary tract infection)     RECENT- ON ANTIBIOTIC, STILL BACTERIA IN URINE     Vitamin D deficiency      Past Surgical History:   Procedure Laterality Date     FUSION CERVICAL ANTERIOR TWO LEVELS      C 5-7     FUSION LUMBAR ANTERIOR THREE+ LEVELS      L3- SI     HYSTERECTOMY, DEYSI  1993    no oophorectomy     INJECT EPIDURAL TRANSFORAMINAL Bilateral 3/9/2017    Procedure: INJECT EPIDURAL TRANSFORAMINAL;  Surgeon: Pérez Valle MD;  Location: [facility] OR     INJECT EPIDURAL TRANSFORAMINAL N/A 5/24/2018    Procedure: INJECT EPIDURAL TRANSFORAMINAL;  transforaminal epidural steroid injection, thoracic 12 - lumbar 1;  Surgeon: Pérez Valle MD;  Location: [facility] OR     LAPAROSCOPIC CHOLECYSTECTOMY  9/12/2012    Procedure: LAPAROSCOPIC CHOLECYSTECTOMY;  Laparoscopic vs Open Cholecystectomy;  Surgeon: Esperanza Quinn MD;  Location: [facility]     LASER HOLMIUM LITHOTRIPSY URETER(S), INSERT STENT, COMBINED  11/2/2012    Procedure: COMBINED CYSTOSCOPY, URETEROSCOPY, LASER HOLMIUM LITHOTRIPSY URETER(S), INSERT STENT;  Cystoscopy, Right Ureteroscopy Holmium Laser Lithotripsy , right ureteral stent placement *Latex Safe*;  Surgeon: Sujey  Joshua Cheung MD;  Location: [facility] OR     SURGICAL HISTORY OF -        section     SURGICAL HISTORY OF -       Tubal ligation     SURGICAL HISTORY OF -       Post hip fracture     SURGICAL HISTORY OF -       hardware removal, right iliac crest      VERTEBROPLASTY       Current Outpatient Medications   Medication Sig Dispense Refill     calcium carbonate (CALCIUM CARBONATE) 600 MG tablet Take 600 mg by mouth       cyclobenzaprine (FLEXERIL) 10 MG tablet TAKE ONE TABLET BY MOUTH THREE TIMES A DAY FOR 10 DAYS  0     Ergocalciferol (VITAMIN D2) 2000 units CAPS Take 2,000 Units by mouth daily       order for DME Equipment being ordered: CPAP       pramipexole (MIRAPEX) 1.5 MG tablet TAKE ONE TABLET BY MOUTH EVERY MORNING AND TAKE TWO TABLETS BY MOUTH AT BEDTIME 90 tablet 3     topiramate (TOPAMAX) 25 MG tablet Take 25 mg by mouth 2 times daily Take 1 tab daily for 3 days, then twice a day, then 2 tabs twice a day       alendronate (FOSAMAX) 70 MG tablet Take 70 mg by mouth every 7 days       hydrOXYzine (ATARAX) 25 MG tablet Take 25-50 mg by mouth 3 times daily as needed for itching       OTC products: None, except as noted above    Allergies   Allergen Reactions     Ambien [Zolpidem Tartrate]      Mental status changes     Asa [Aspirin]      Accidental overdose.     Celexa [Citalopram Hydrobromide]      review with pt., i cant' find side effect in chart. 06 Sylvie Mederos MD       Doxychel [Doxycycline Hyclate]      Gabapentin      Rectalurgency      Tape [Adhesive Tape]      Zoloft      Wt gain       Latex Allergy: NO    Social History     Tobacco Use     Smoking status: Former Smoker     Packs/day: 0.50     Types: Cigarettes     Last attempt to quit: 2018     Years since quittin.2     Smokeless tobacco: Never Used   Substance Use Topics     Alcohol use: No     Alcohol/week: 0.0 standard drinks     History   Drug Use No       REVIEW OF SYSTEMS:   CONSTITUTIONAL: NEGATIVE for fever,  "chills, change in weight  INTEGUMENTARY/SKIN: NEGATIVE for worrisome rashes, moles or lesions  EYES: NEGATIVE for vision changes or irritation  ENT/MOUTH: NEGATIVE for ear, mouth and throat problems  RESP: NEGATIVE for significant cough or SOB  BREAST: NEGATIVE for masses, tenderness or discharge  CV: NEGATIVE for chest pain, palpitations or peripheral edema  GI: NEGATIVE for nausea, abdominal pain, heartburn, or change in bowel habits  : NEGATIVE for frequency, dysuria, or hematuria  MUSCULOSKELETAL: NEGATIVE for significant arthralgias or myalgia  NEURO: NEGATIVE for weakness, dizziness or paresthesias  ENDOCRINE: NEGATIVE for temperature intolerance, skin/hair changes  HEME: NEGATIVE for bleeding problems  PSYCHIATRIC: NEGATIVE for changes in mood or affect    EXAM:   /82 (Cuff Size: Adult Regular)   Pulse 81   Temp 98.3  F (36.8  C) (Tympanic)   Resp 20   Ht 1.556 m (5' 1.25\")   Wt 70.8 kg (156 lb)   SpO2 99%   BMI 29.24 kg/m      GENERAL APPEARANCE: healthy, alert and no distress     EYES: EOMI, PERRL     HENT: ear canals and TM's normal and nose and mouth without ulcers or lesions     NECK: no adenopathy, no asymmetry, masses, or scars and thyroid normal to palpation     RESP: lungs clear to auscultation - no rales, rhonchi or wheezes     CV: regular rates and rhythm, normal S1 S2, no S3 or S4 and no murmur, click or rub     ABDOMEN:  soft, nontender, no HSM or masses and bowel sounds normal     MS: extremities normal- no gross deformities noted, no evidence of inflammation in joints, FROM in all extremities.     SKIN: no suspicious lesions or rashes     NEURO: Normal strength and tone, sensory exam grossly normal, mentation intact and speech normal     PSYCH: mentation appears normal. and affect normal/bright     LYMPHATICS: No cervical adenopathy    DIAGNOSTICS:       Recent Labs   Lab Test 11/25/19  1727 09/17/19  1544  03/09/18  1006  05/08/17  1530  03/12/12  1105   HGB 12.7 12.1   < > " 11.6*   < >  --    < >  --     259   < > 366  --   --    < >  --    INR  --   --   --  1.22*  --  0.92  --   --    * 138   < > 135  --   --    < >  --    POTASSIUM 2.9* 3.7   < > 3.9  --   --    < >  --    CR 0.68 0.63   < > 0.74  --   --    < >  --    A1C  --   --   --   --   --   --   --  5.8    < > = values in this interval not displayed.        IMPRESSION:   Reason for surgery/procedure:    Removal of neurostimulaor IPG and Leads      Diagnosis/reason for consult:  Pre op clearance     The proposed surgical procedure is considered INTERMEDIATE risk.    REVISED CARDIAC RISK INDEX  The patient has the following serious cardiovascular risks for perioperative complications such as (MI, PE, VFib and 3  AV Block):  No serious cardiac risks  INTERPRETATION: 0 risks: Class I (very low risk - 0.4% complication rate)    The patient has the following additional risks for perioperative complications:  No identified additional risks    1. Preop general physical exam      2. Chronic bilateral low back pain without sciatica  Not cleared for surgery at this time   Needs to have potassium rechecked- not done today in clinic   Patient hospitalized 1/1/2020 for chest pain she will need to schedule a hospital follow up and postpone her procedure     3. Right flank pain  Normal UA   - *UA reflex to Microscopic and Culture ([facility] (except [place] and [place])    4. Hypokalemia  - **Basic metabolic panel FUTURE anytime; Future  - **Basic metabolic panel FUTURE 1yr; Future    RECOMMENDATIONS:     We have attempted to contact patient as there was a miscommunication and her labs were not completed. I am unable to clear her for surgery with her low potassium.     Noted that 1/1/2020 patient hospitalized for chest pain- recommend that her surgery date be post-poned she will need a new pre op for clearance.          Signed Electronically by: Ana Paula Mccray NP    Copy of this evaluation report is  provided to requesting physician.    [place] Preop Guidelines    Revised Cardiac Risk Index

## 2020-01-02 ENCOUNTER — TELEPHONE (OUTPATIENT)
Dept: FAMILY MEDICINE | Facility: CLINIC | Age: 60
End: 2020-01-02

## 2020-01-02 NOTE — TELEPHONE ENCOUNTER
Vesta called again. She is extremely concerned about her surgery being cancelled because she thinks that her body is rejecting this stimulator and she states the leads are right behind her heart. She said her pain comes and goes. Also states the facility that she is having the surgery at is requesting her pre-op be faxed to her. Did advise that Marian is in on Friday and she could possibly talk to her then.    Alivia Rodriguez-[facility] [place]

## 2020-01-02 NOTE — TELEPHONE ENCOUNTER
Please have RN talk to pt regarding her Stimulator and having some pain.  Looks like this Enc was closed  Loretta [facility]

## 2020-01-02 NOTE — TELEPHONE ENCOUNTER
Left message for patient. If she calls back, please relay the following:    Ana Paula Mccray NP  [facility] [place]             PLease call patient. It looks like she is hospitalized for chest pain. I recommend that she cancel her surgery. I am unable to clear her for surgery as need to recheck after her hospitalization. She should schedule a follow up. Marian

## 2020-01-02 NOTE — TELEPHONE ENCOUNTER
Patient called back. She said she was released from the hospital last night. She is home. On antibiotics and potassium. She said the doctor taking care of her said to go ahead with the surgery. She did have her labs sent down to where she is having the surgery. She said they think the spinal cord stimulator is what is causing her pain. They want her to get it out.     Alivia Rodriguez-[facility] [place]

## 2020-01-03 ENCOUNTER — OFFICE VISIT (OUTPATIENT)
Dept: FAMILY MEDICINE | Facility: CLINIC | Age: 60
End: 2020-01-03
Payer: MEDICARE

## 2020-01-03 DIAGNOSIS — M54.50 CHRONIC BILATERAL LOW BACK PAIN WITHOUT SCIATICA: ICD-10-CM

## 2020-01-03 DIAGNOSIS — Z96.89 S/P INSERTION OF SPINAL CORD STIMULATOR: ICD-10-CM

## 2020-01-03 DIAGNOSIS — J18.9 PNEUMONIA OF LEFT LOWER LOBE DUE TO INFECTIOUS ORGANISM: ICD-10-CM

## 2020-01-03 DIAGNOSIS — E87.6 HYPOKALEMIA: ICD-10-CM

## 2020-01-03 DIAGNOSIS — G47.33 OSA (OBSTRUCTIVE SLEEP APNEA): ICD-10-CM

## 2020-01-03 DIAGNOSIS — R07.89 ATYPICAL CHEST PAIN: ICD-10-CM

## 2020-01-03 DIAGNOSIS — Z01.818 PREOP GENERAL PHYSICAL EXAM: Primary | ICD-10-CM

## 2020-01-03 DIAGNOSIS — G89.29 CHRONIC BILATERAL LOW BACK PAIN WITHOUT SCIATICA: ICD-10-CM

## 2020-01-03 LAB
ANION GAP SERPL CALCULATED.3IONS-SCNC: 4 MMOL/L (ref 3–14)
BUN SERPL-MCNC: 13 MG/DL (ref 7–30)
CALCIUM SERPL-MCNC: 8.9 MG/DL (ref 8.5–10.1)
CHLORIDE SERPL-SCNC: 112 MMOL/L (ref 94–109)
CO2 SERPL-SCNC: 26 MMOL/L (ref 20–32)
CREAT SERPL-MCNC: 0.56 MG/DL (ref 0.52–1.04)
GFR SERPL CREATININE-BSD FRML MDRD: >90 ML/MIN/{1.73_M2}
GLUCOSE SERPL-MCNC: 123 MG/DL (ref 70–99)
POTASSIUM SERPL-SCNC: 4 MMOL/L (ref 3.4–5.3)
SODIUM SERPL-SCNC: 142 MMOL/L (ref 133–144)

## 2020-01-03 PROCEDURE — 36415 COLL VENOUS BLD VENIPUNCTURE: CPT | Performed by: NURSE PRACTITIONER

## 2020-01-03 PROCEDURE — 99214 OFFICE O/P EST MOD 30 MIN: CPT | Performed by: NURSE PRACTITIONER

## 2020-01-03 PROCEDURE — 80048 BASIC METABOLIC PNL TOTAL CA: CPT | Performed by: NURSE PRACTITIONER

## 2020-01-03 PROCEDURE — 93000 ELECTROCARDIOGRAM COMPLETE: CPT | Performed by: NURSE PRACTITIONER

## 2020-01-03 RX ORDER — AZITHROMYCIN 250 MG/1
TABLET, FILM COATED ORAL
COMMUNITY
Start: 2020-01-01 | End: 2020-05-05

## 2020-01-03 RX ORDER — POTASSIUM CHLORIDE 1500 MG/1
20 TABLET, EXTENDED RELEASE ORAL
COMMUNITY
Start: 2020-01-01 | End: 2020-01-08

## 2020-01-03 ASSESSMENT — MIFFLIN-ST. JEOR: SCORE: 1242.56

## 2020-01-03 NOTE — PATIENT INSTRUCTIONS
My preference would be to have stress test first and then procedure   Suspect that spinal cord stimulator is source of pain will proceed with procedure inlight of negative cardiac work up   Follow up with me 1 week after surgery       Before Your Surgery      Call your surgeon if there is any change in your health. This includes signs of a cold or flu (such as a sore throat, runny nose, cough, rash or fever).    Do not smoke, drink alcohol or take over the counter medicine (unless your surgeon or primary care doctor tells you to) for the 24 hours before and after surgery.    If you take prescribed drugs: Follow your doctor s orders about which medicines to take and which to stop until after surgery.    Eating and drinking prior to surgery: follow the instructions from your surgeon    Take a shower or bath the night before surgery. Use the soap your surgeon gave you to gently clean your skin. If you do not have soap from your surgeon, use your regular soap. Do not shave or scrub the surgery site.  Wear clean pajamas and have clean sheets on your bed.

## 2020-01-03 NOTE — PROGRESS NOTES
[address] [address]  [phone]  Dept: [phone]    PRE-OP EVALUATION:  Today's date: 1/3/2020    Vesta Rodriguez (: 1960) presents for pre-operative evaluation assessment as requested by Dr. rizo.  She requires evaluation and anesthesia risk assessment prior to undergoing surgery/procedure for treatment of   .    Fax number for surgical facility:[phone]  Primary Physician: Ana Paula Mccray  Type of Anesthesia Anticipated: General    Patient has a Health Care Directive or Living Will:  NO    Preop Questions 1/3/2020   Who is doing your surgery? sallie   What are you having done? spinal cord stimulator taken out   Date of Surgery/Procedure: 2020   Facility or Hospital where procedure/surgery will be performed: [facility]   1.  Do you have a history of Heart attack, stroke, stent, coronary bypass surgery, or other heart surgery? NO   2.  Do you ever have any pain or discomfort in your chest? YES - still having chest pain    3.  Do you have a history of  Heart Failure? No   4.   Are you troubled by shortness of breath when:  walking on a level surface, or up a slight hill, or at night? YES - shortness of breath with activity    5.  Do you currently have a cold, bronchitis or other respiratory infection? No   6.  Do you have a cough, shortness of breath, or wheezing? No   7.  Do you sometimes get pains in the calves of your legs when you walk? No   8. Do you or anyone in your family have previous history of blood clots? No   9.  Do you or does anyone in your family have a serious bleeding problem such as prolonged bleeding following surgeries or cuts? No   10. Have you ever had problems with anemia or been told to take iron pills? No   11. Have you had any abnormal blood loss such as black, tarry or bloody stools, or abnormal vaginal bleeding? UNKNOWN -    12. Have you ever had a blood transfusion? YES - years ago    13. Have you or  any of your relatives ever had problems with anesthesia? No   14. Do you have sleep apnea, excessive snoring or daytime drowsiness? YES - sleep apnea    15. Do you have any prosthetic heart valves? No   16. Do you have prosthetic joints? No   17. Is there any chance that you may be pregnant? No         HPI:     HPI related to upcoming procedure: Patient had a neurostimulator placed in August and she is scheduled to have it removed as it has not been helpful and it recently malfunctioned in that she was having issues with it  interchanging from therapy/MRI mode. She had this placed due to her chronic low back pain. This is being done at the [facility]. She is here today for pre operative clearance.     She was seen on 12/27/19- pre op was pending potassium level. I had requested that she come in for a recheck as patient was kept under observation at [facility] in [place] on 1/1/20 due to epigastric/chest pain    BRIEF HOSPITAL COURSE: she has history of sever degenerative disc diseas eof the lumbar spine, s/p lumbar fusion and spinal stimulator placement. She has graves disease, copd, gerd as well. She notes she was at home tonight and developed pains in her back. This was fairly intense. The pain then moved to the front of the chest. She had some short of breath associated with this. Denied any nausea, diaphoresis. She called the ambulance and was brought to the er. She received ntg in route and was pain free upon arrival. She was evaluated and had a negative EKG and CXR. Troponin was negative. She was admitted for rule out. She has remained pain free.. Her second troponin returned negative. She was demanding to go home. I had conversation with her concerning rational for serial troponins. She still demanded to leave. She noted no further pains.   Sh had a low potassium and magnesium. Her CXR showed a possible infiltrate and she was given rocephin. She had no cough.   She has scheduled procedure to remove her  spinal stimulator in a week. She wants to go home to rest. She was discharged on zithromax and potassium and limited amount of pain medications.  Today-  She denies any cough or shortness of breath. She is afebrile.   She continue to have pain. It is lower in her epigastric region, she has reproducible tenderness. She reports the pain originates in her back and radiates to her abdomen. She believes that the pain is coming from there pain stimulator and wants it out as planned on 1/7/2020.     See problem list for active medical problems.  Problems all longstanding and stable, except as noted/documented.  See ROS for pertinent symptoms related to these conditions.      MEDICAL HISTORY:     Patient Active Problem List    Diagnosis Date Noted     History of alcoholism (H) 07/26/2019     Priority: Medium     Has been sober Nov 2014       CATALINA (obstructive sleep apnea) 05/09/2018     Priority: Medium     Sleep apnea 05/09/2018     Priority: Medium     Overview:   does not use CPAP due to insomnia       Spondylolisthesis of lumbar region 05/09/2018     Priority: Medium     Colitis 02/26/2018     Priority: Medium     S/P lumbar fusion 06/09/2017     Priority: Medium     Lumbar stenosis 05/30/2017     Priority: Medium     Advance Care Planning 03/08/2017     Priority: Medium     Advance Care Planning 7/5/2017: Recommend Code Status in chart and patient's Advance Care Planning documents be reviewed to ensure alignment with patient's current wishes. Added by Mary Kay Forman [facility] Advance Care Planning Liaison  Advance Care Planning 3/8/2017: ACP Review of Chart / Resources Provided:  Reviewed chart for advance care plan.  Vesta Rodriguez has been provided information and resources to begin or update their advance care plan.  Added by Vesta Bradley       Major depressive disorder, recurrent episode, moderate (H) 10/24/2016     Priority: Medium     S/P cholecystectomy 08/04/2016     Priority: Medium     Graves disease 01/25/2016      Priority: Medium     H/O vertebroplasty 08/12/2013     Priority: Medium     Thyroid nodule; on outsie dc,due for f/u 5/13 04/19/2013     Priority: Medium     ultrasound may , 2013  Due for followup          Compression fracture 01/16/2013     Priority: Medium     GERD (gastroesophageal reflux disease) 01/16/2013     Priority: Medium     H/O of right ureteral stone 10/05/2012     Priority: Medium     8/1612         Chronic cholecystitis 09/04/2012     Priority: Medium     H/O: hysterectomy 08/19/2012     Priority: Medium     Tobacco user 08/28/2011     Priority: Medium     Family history of breast cancer in mother 08/28/2011     Priority: Medium     Vitamin D deficiency 08/28/2011     Priority: Medium     CARDIOVASCULAR SCREENING; LDL GOAL LESS THAN 130 10/31/2010     Priority: Medium     COPD (chronic obstructive pulmonary disease); spirometry 3/10 ; rare use of mdi 03/15/2010     Priority: Medium     Anxiety 09/26/2008     Priority: Medium     Disturbance in sleep behavior 10/08/2007     Priority: Medium     Has failed mult meds, - lunesta, ambien, klonopin  etoh treatment 2013  Not candidate for klonopin      Problem list name updated by automated process. Provider to review       Displacement of cervical intervertebral disc without myelopathy 10/08/2007     Priority: Medium     Cervical fusion. [place] [facility].     2004       Restless legs syndrome (RLS) 07/30/2007     Priority: Medium     On mirapex, unable to go off     0.5 mg twice a day , for rls        Displacement of lumbar intervertebral disc without myelopathy 02/20/2006     Priority: Medium     2 disc fusion        Brachial neuritis or radiculitis 07/06/2005     Priority: Medium     Problem list name updated by automated process. Provider to review       Osteoporosis 07/06/2005     Priority: Medium     Hip fracture after bone graft.     forteo 3/09 - until summer 2010  Problem list name updated by automated process. Provider to review        Past  Medical History:   Diagnosis Date     Anemia      Anemia due to blood loss, acute 10/21/2012     Anxiety      Back pain      Chronic cholecystitis      Degeneration of cervical intervertebral disc     04     Degeneration of lumbar or lumbosacral intervertebral disc      Major depression in remission (H)      Osteoporosis      Right ureteral stone      RLS (restless legs syndrome)      Sleep disturbance      UTI (urinary tract infection)     RECENT- ON ANTIBIOTIC, STILL BACTERIA IN URINE     Vitamin D deficiency      Past Surgical History:   Procedure Laterality Date     FUSION CERVICAL ANTERIOR TWO LEVELS      C 5-7     FUSION LUMBAR ANTERIOR THREE+ LEVELS      L3- SI     HYSTERECTOMY, DEYSI      no oophorectomy     INJECT EPIDURAL TRANSFORAMINAL Bilateral 3/9/2017    Procedure: INJECT EPIDURAL TRANSFORAMINAL;  Surgeon: Pérez Valle MD;  Location: [facility] OR     INJECT EPIDURAL TRANSFORAMINAL N/A 2018    Procedure: INJECT EPIDURAL TRANSFORAMINAL;  transforaminal epidural steroid injection, thoracic 12 - lumbar 1;  Surgeon: Pérez Valle MD;  Location: [facility] OR     LAPAROSCOPIC CHOLECYSTECTOMY  2012    Procedure: LAPAROSCOPIC CHOLECYSTECTOMY;  Laparoscopic vs Open Cholecystectomy;  Surgeon: Esperanza Quinn MD;  Location: [facility]     LASER HOLMIUM LITHOTRIPSY URETER(S), INSERT STENT, COMBINED  2012    Procedure: COMBINED CYSTOSCOPY, URETEROSCOPY, LASER HOLMIUM LITHOTRIPSY URETER(S), INSERT STENT;  Cystoscopy, Right Ureteroscopy Holmium Laser Lithotripsy , right ureteral stent placement *Latex Safe*;  Surgeon: Joshua Rm MD;  Location: [facility] OR     SURGICAL HISTORY OF -        section     SURGICAL HISTORY OF -       Tubal ligation     SURGICAL HISTORY OF -       Post hip fracture     SURGICAL HISTORY OF -       hardware removal, right iliac crest      VERTEBROPLASTY       Current Outpatient Medications   Medication Sig Dispense Refill     alendronate (FOSAMAX) 70 MG  tablet Take 70 mg by mouth every 7 days       azithromycin (ZITHROMAX) 250 MG tablet Take 500 mg (2 tabs) by mouth on day 1, then 250 mg (1 tab) daily for days 2-5.       calcium carbonate (CALCIUM CARBONATE) 600 MG tablet Take 600 mg by mouth       cyclobenzaprine (FLEXERIL) 10 MG tablet TAKE ONE TABLET BY MOUTH THREE TIMES A DAY FOR 10 DAYS  0     Ergocalciferol (VITAMIN D2) 2000 units CAPS Take 2,000 Units by mouth daily       hydrOXYzine (ATARAX) 25 MG tablet Take 25-50 mg by mouth 3 times daily as needed for itching       order for DME Equipment being ordered: CPAP       potassium chloride ER (K-DUR/KLOR-CON M) 20 MEQ CR tablet Take 20 mEq by mouth       pramipexole (MIRAPEX) 1.5 MG tablet TAKE ONE TABLET BY MOUTH EVERY MORNING AND TAKE TWO TABLETS BY MOUTH AT BEDTIME 90 tablet 3     topiramate (TOPAMAX) 25 MG tablet Take 25 mg by mouth 2 times daily Take 1 tab daily for 3 days, then twice a day, then 2 tabs twice a day       OTC products: None, except as noted above    Allergies   Allergen Reactions     Ambien [Zolpidem Tartrate]      Mental status changes     Asa [Aspirin]      Accidental overdose.     Celexa [Citalopram Hydrobromide]      review with pt., i cant' find side effect in chart. 06 Sylvie Mederos MD       Doxychel [Doxycycline Hyclate]      Gabapentin      Rectalurgency      Tape [Adhesive Tape]      Zoloft      Wt gain       Latex Allergy: NO    Social History     Tobacco Use     Smoking status: Former Smoker     Packs/day: 0.50     Types: Cigarettes     Last attempt to quit: 2018     Years since quittin.3     Smokeless tobacco: Never Used   Substance Use Topics     Alcohol use: No     Alcohol/week: 0.0 standard drinks     History   Drug Use No       REVIEW OF SYSTEMS:   CONSTITUTIONAL: NEGATIVE for fever, chills, change in weight  INTEGUMENTARY/SKIN: NEGATIVE for worrisome rashes, moles or lesions  EYES: NEGATIVE for vision changes or irritation  ENT/MOUTH: NEGATIVE for ear, mouth  "and throat problems  RESP: NEGATIVE for significant cough or SOB  CV: POSITIVE for chest pain  GI: POSITIVE for abdominal pain epigastric  MUSCULOSKELETAL: NEGATIVE for significant arthralgias or myalgia  NEURO: NEGATIVE for weakness, dizziness or paresthesias  ENDOCRINE: NEGATIVE for temperature intolerance, skin/hair changes  HEME: NEGATIVE for bleeding problems  PSYCHIATRIC: NEGATIVE for changes in mood or affect    EXAM:   /80   Pulse 72   Temp 98.1  F (36.7  C) (Tympanic)   Resp 16   Ht 1.556 m (5' 1.25\")   Wt 72.1 kg (159 lb)   BMI 29.80 kg/m      GENERAL APPEARANCE: healthy, alert and no distress     EYES: EOMI, PERRL     HENT: ear canals and TM's normal and nose and mouth without ulcers or lesions     NECK: no adenopathy, no asymmetry, masses, or scars and thyroid normal to palpation     RESP: lungs clear to auscultation - no rales, rhonchi or wheezes     CV: regular rates and rhythm, normal S1 S2, no S3 or S4 and no murmur, click or rub     ABDOMEN: tender in epigastric region      MS: decreased ROM of lumbar spine      SKIN: no suspicious lesions or rashes     NEURO: Normal strength and tone, sensory exam grossly normal, mentation intact and speech normal     PSYCH: mentation appears normal. and affect normal/bright     LYMPHATICS: No cervical adenopathy    DIAGNOSTICS:   EKG: appears normal, NSR, nonspecific ST-T changes, unchanged from previous tracings  Last Comprehensive Metabolic Panel:  Sodium   Date Value Ref Range Status   01/03/2020 142 133 - 144 mmol/L Final     Potassium   Date Value Ref Range Status   01/03/2020 4.0 3.4 - 5.3 mmol/L Final     Chloride   Date Value Ref Range Status   01/03/2020 112 (H) 94 - 109 mmol/L Final     Carbon Dioxide   Date Value Ref Range Status   01/03/2020 26 20 - 32 mmol/L Final     Anion Gap   Date Value Ref Range Status   01/03/2020 4 3 - 14 mmol/L Final     Glucose   Date Value Ref Range Status   01/03/2020 123 (H) 70 - 99 mg/dL Final     Comment:     " Non Fasting     Urea Nitrogen   Date Value Ref Range Status   01/03/2020 13 7 - 30 mg/dL Final     Creatinine   Date Value Ref Range Status   01/03/2020 0.56 0.52 - 1.04 mg/dL Final     GFR Estimate   Date Value Ref Range Status   01/03/2020 >90 >60 mL/min/[1.73_m2] Final     Comment:     Non  GFR Calc  Starting 12/18/2018, serum creatinine based estimated GFR (eGFR) will be   calculated using the Chronic Kidney Disease Epidemiology Collaboration   (CKD-EPI) equation.       Calcium   Date Value Ref Range Status   01/03/2020 8.9 8.5 - 10.1 mg/dL Final     Lab Results   Component Value Date    WBC 6.7 11/25/2019     Lab Results   Component Value Date    RBC 4.33 11/25/2019     Lab Results   Component Value Date    HGB 12.7 11/25/2019     Lab Results   Component Value Date    HCT 39.5 11/25/2019     Lab Results   Component Value Date    MCV 91 11/25/2019     Lab Results   Component Value Date    MCH 29.3 11/25/2019     Lab Results   Component Value Date    MCHC 32.2 11/25/2019     Lab Results   Component Value Date    RDW 15.3 11/25/2019     Lab Results   Component Value Date     11/25/2019           IMPRESSION:   Reason for surgery/procedure: chronic low back pain/removal of spine stimulator   Diagnosis/reason for consult: pre op cleareance    The proposed surgical procedure is considered INTERMEDIATE risk.    REVISED CARDIAC RISK INDEX  The patient has the following serious cardiovascular risks for perioperative complications such as (MI, PE, VFib and 3  AV Block):  No serious cardiac risks  INTERPRETATION: 0 risks: Class I (very low risk - 0.4% complication rate)    The patient has the following additional risks for perioperative complications:  No identified additional risks  1. Preop general physical exam    2. Chronic bilateral low back pain without sciatica  3. S/P insertion of spinal cord stimulator  Patient scheduled to have spinal cord stimulator removed   She reports that her pain has  not improved with this and in fact has significantly worsened     4. Atypical chest pain  She had a negative work up with serial troponins x2, unchanged EKG   Her pain is reproducible in epigastric region and radiates from the back   She feels that this is related to the pain stimulator   She is schedule to spine stimulator removed  I will clear her for surgery   Low suspicion for cardiac origin of pain      5. Hypokalemia  Resolved   - Basic metabolic panel    6. Pneumonia of left lower lobe due to infectious organism (H)  She is on z pack for ?infiltrate on left lower lobe CXR       7. CATALINA (obstructive sleep apnea)  Needs to follow up with sleep specialist     RECOMMENDATIONS:         --Patient is to hold all scheduled medications on the day of surgery.    APPROVAL GIVEN to proceed with proposed procedure, without further diagnostic evaluation       Signed Electronically by: Ana Paula Mccray NP    Copy of this evaluation report is provided to requesting physician.    [facility] Preop Guidelines    Revised Cardiac Risk Index

## 2020-01-06 VITALS
HEART RATE: 72 BPM | RESPIRATION RATE: 16 BRPM | HEIGHT: 61 IN | TEMPERATURE: 98.1 F | BODY MASS INDEX: 30.02 KG/M2 | DIASTOLIC BLOOD PRESSURE: 80 MMHG | WEIGHT: 159 LBS | SYSTOLIC BLOOD PRESSURE: 139 MMHG

## 2020-03-20 ENCOUNTER — OFFICE VISIT (OUTPATIENT)
Dept: FAMILY MEDICINE | Facility: CLINIC | Age: 60
End: 2020-03-20
Payer: MEDICARE

## 2020-03-20 ENCOUNTER — ANCILLARY PROCEDURE (OUTPATIENT)
Dept: GENERAL RADIOLOGY | Facility: CLINIC | Age: 60
End: 2020-03-20
Attending: NURSE PRACTITIONER
Payer: MEDICARE

## 2020-03-20 VITALS
WEIGHT: 160 LBS | OXYGEN SATURATION: 96 % | BODY MASS INDEX: 29.99 KG/M2 | TEMPERATURE: 97.9 F | RESPIRATION RATE: 12 BRPM | HEART RATE: 81 BPM | DIASTOLIC BLOOD PRESSURE: 80 MMHG | SYSTOLIC BLOOD PRESSURE: 135 MMHG

## 2020-03-20 DIAGNOSIS — Z01.818 PREOP GENERAL PHYSICAL EXAM: Primary | ICD-10-CM

## 2020-03-20 DIAGNOSIS — M48.02 CERVICAL STENOSIS OF SPINE: ICD-10-CM

## 2020-03-20 DIAGNOSIS — G47.33 OSA (OBSTRUCTIVE SLEEP APNEA): ICD-10-CM

## 2020-03-20 DIAGNOSIS — M54.2 NECK PAIN: ICD-10-CM

## 2020-03-20 DIAGNOSIS — D72.829 LEUKOCYTOSIS, UNSPECIFIED TYPE: ICD-10-CM

## 2020-03-20 DIAGNOSIS — J42 CHRONIC BRONCHITIS, UNSPECIFIED CHRONIC BRONCHITIS TYPE (H): ICD-10-CM

## 2020-03-20 LAB
ANION GAP SERPL CALCULATED.3IONS-SCNC: 2 MMOL/L (ref 3–14)
BASOPHILS # BLD AUTO: 0 10E9/L (ref 0–0.2)
BASOPHILS NFR BLD AUTO: 0.1 %
BUN SERPL-MCNC: 18 MG/DL (ref 7–30)
CALCIUM SERPL-MCNC: 9 MG/DL (ref 8.5–10.1)
CHLORIDE SERPL-SCNC: 107 MMOL/L (ref 94–109)
CO2 SERPL-SCNC: 29 MMOL/L (ref 20–32)
CREAT SERPL-MCNC: 0.56 MG/DL (ref 0.52–1.04)
DIFFERENTIAL METHOD BLD: ABNORMAL
EOSINOPHIL # BLD AUTO: 0 10E9/L (ref 0–0.7)
EOSINOPHIL NFR BLD AUTO: 0 %
ERYTHROCYTE [DISTWIDTH] IN BLOOD BY AUTOMATED COUNT: 14.7 % (ref 10–15)
GFR SERPL CREATININE-BSD FRML MDRD: >90 ML/MIN/{1.73_M2}
GLUCOSE SERPL-MCNC: 134 MG/DL (ref 70–99)
HCT VFR BLD AUTO: 41.1 % (ref 35–47)
HGB BLD-MCNC: 13.2 G/DL (ref 11.7–15.7)
IMM GRANULOCYTES # BLD: 0.1 10E9/L (ref 0–0.4)
IMM GRANULOCYTES NFR BLD: 0.6 %
LYMPHOCYTES # BLD AUTO: 1.1 10E9/L (ref 0.8–5.3)
LYMPHOCYTES NFR BLD AUTO: 7.2 %
MCH RBC QN AUTO: 29.4 PG (ref 26.5–33)
MCHC RBC AUTO-ENTMCNC: 32.1 G/DL (ref 31.5–36.5)
MCV RBC AUTO: 92 FL (ref 78–100)
MONOCYTES # BLD AUTO: 0.4 10E9/L (ref 0–1.3)
MONOCYTES NFR BLD AUTO: 2.8 %
NEUTROPHILS # BLD AUTO: 13.2 10E9/L (ref 1.6–8.3)
NEUTROPHILS NFR BLD AUTO: 89.3 %
NRBC # BLD AUTO: 0 10*3/UL
NRBC BLD AUTO-RTO: 0 /100
PLATELET # BLD AUTO: 308 10E9/L (ref 150–450)
POTASSIUM SERPL-SCNC: 3.8 MMOL/L (ref 3.4–5.3)
RBC # BLD AUTO: 4.49 10E12/L (ref 3.8–5.2)
SODIUM SERPL-SCNC: 138 MMOL/L (ref 133–144)
WBC # BLD AUTO: 14.8 10E9/L (ref 4–11)

## 2020-03-20 PROCEDURE — 80048 BASIC METABOLIC PNL TOTAL CA: CPT | Performed by: NURSE PRACTITIONER

## 2020-03-20 PROCEDURE — 85025 COMPLETE CBC W/AUTO DIFF WBC: CPT | Performed by: NURSE PRACTITIONER

## 2020-03-20 PROCEDURE — 36415 COLL VENOUS BLD VENIPUNCTURE: CPT | Performed by: NURSE PRACTITIONER

## 2020-03-20 PROCEDURE — 71046 X-RAY EXAM CHEST 2 VIEWS: CPT | Mod: FY

## 2020-03-20 PROCEDURE — 99215 OFFICE O/P EST HI 40 MIN: CPT | Performed by: NURSE PRACTITIONER

## 2020-03-20 RX ORDER — METHYLPREDNISOLONE 4 MG
TABLET, DOSE PACK ORAL
COMMUNITY
Start: 2020-03-19 | End: 2020-05-05

## 2020-03-20 RX ORDER — ERGOCALCIFEROL 1.25 MG/1
50000 CAPSULE, LIQUID FILLED ORAL WEEKLY
COMMUNITY
Start: 2020-02-11 | End: 2024-01-23

## 2020-03-20 RX ORDER — TOPIRAMATE 25 MG/1
50 TABLET, FILM COATED ORAL 2 TIMES DAILY
Qty: 60 TABLET | Refills: 0 | Status: SHIPPED | OUTPATIENT
Start: 2020-03-20 | End: 2020-05-05

## 2020-03-20 ASSESSMENT — PAIN SCALES - GENERAL: PAINLEVEL: NO PAIN (0)

## 2020-03-20 NOTE — PROGRESS NOTES
[address] [address]  [phone]  Dept: [phone]    PRE-OP EVALUATION:  Today's date: 3/20/2020    Vesta Rodriguez (: 1960) presents for pre-operative evaluation assessment as requested by Dr. Alonso.  She requires evaluation and anesthesia risk assessment prior to undergoing surgery/procedure for treatment of  Cervical radiculopathy .    Fax number for surgical facility: [phone]  Primary Physician: Ana Paula Mccray  Type of Anesthesia Anticipated: General    Patient has a Health Care Directive or Living Will:  NO    Preop Questions 3/20/2020   Who is doing your surgery? osvaldo   What are you having done? decompression in my neck   Date of Surgery/Procedure: 2020   Facility or Hospital where procedure/surgery will be performed: [facility] [place]   1.  Do you have a history of Heart attack, stroke, stent, coronary bypass surgery, or other heart surgery? No   2.  Do you ever have any pain or discomfort in your chest? No   3.  Do you have a history of  Heart Failure? No   4.   Are you troubled by shortness of breath when:  walking on a level surface, or up a slight hill, or at night? No   5.  Do you currently have a cold, bronchitis or other respiratory infection? No   6.  Do you have a cough, shortness of breath, or wheezing? No   7.  Do you sometimes get pains in the calves of your legs when you walk? No   8. Do you or anyone in your family have previous history of blood clots? No   9.  Do you or does anyone in your family have a serious bleeding problem such as prolonged bleeding following surgeries or cuts? No   10. Have you ever had problems with anemia or been told to take iron pills? No   11. Have you had any abnormal blood loss such as black, tarry or bloody stools, or abnormal vaginal bleeding? No   12. Have you ever had a blood transfusion? YES - ? Due to bleeding of the esophagus    13. Have you or any of  your relatives ever had problems with anesthesia? No   14. Do you have sleep apnea, excessive snoring or daytime drowsiness? YES - sleep apnea, unable to tolerate CPAP. Has a referral to see sleep specialist for further evaluation    15. Do you have any prosthetic heart valves? No   16. Do you have prosthetic joints? No   17. Is there any chance that you may be pregnant? No         HPI:     HPI related to upcoming procedure: patient is schedule for decompression of cervical spine due to chronic neck pain with radicular pain, numbness and weakness of left arm. She denies any cough, shortness of breath, chest pain. She feels well today. Patient tells me her surgery is felt to be urgent and is recommended by the surgeon due to concerns of progression in her arm numbness and pain and plan is to proceed despite COVID 19 pandemic. She is currently on dexamethasone.    INDICATION:  Chronic neck pain, left arm and hand weakness.    TECHNIQUE:  Noncontrast sagittal T1, T2, STIR and axial GRE sequences are provided. Radiographs of the cervical spine dated July 9, 2018, MRI cervical spine dated July 31, 2018.     FINDINGS:  ACDF from C5-C7, as before, with expected associated susceptibility artifact. Intervertebral height otherwise maintained. Stable mild straightening of the expected cervical lordosis. No spondylolisthesis. Cervical vertebral body heights maintained. Minimal anterior vertebral body wedging at T1 with small superior endplate Schmorl`s node, stable. Cervical cord is normal.     C2-C3: No significant central spinal canal narrowing. Mild/minimal bilateral uncovertebral joint hypertrophy and hypertrophic facet arthropathy. Mild-to-moderate bilateral neural foraminal narrowing with focal impingement of the exiting C3 nerve root on the left.   C3-C4: Minimal diffuse disc bulge. No significant central spinal canal narrowing. Asymmetric mild bilateral uncovertebral joint hypertrophy and hypertrophic facet arthropathy.  Mild-to-moderate bilateral neural foraminal narrowing. Impingement of the bilateral exiting C4 nerve roots.   C4-C5: Minimal left eccentric diffuse disc bulge. No significant central spinal canal narrowing. Asymmetric mild/mild-to-moderate bilateral uncovertebral joint hypertrophy and hypertrophic facet arthropathy. Mild to moderate bilateral neural foraminal narrowing. Focal impingement of the exiting C5 nerve root on the left.   C5-C6: Surgical level. No significant central spinal canal narrowing. Mild/moderate bilateral uncovertebral joint hypertrophy and hypertrophic facet arthropathy. Mild right and mild-to-moderate left neural foraminal narrowing. No exiting nerve root impingement.   C6-7: Surgical level. No significant central spinal canal narrowing. Mild bilateral uncovertebral joint hypertrophy. Mild left neural foraminal narrowing. No exiting nerve root impingement.   C7-T1: No significant central spinal canal narrowing. Mild to moderate left uncovertebral joint hypertrophy. Mild to moderate right and moderate to severe left neural foraminal narrowing, impingement of the exiting C8 nerve root on the left.     IMPRESSION:  1. ACDF from C5-C7, as before.   2. No significant central spinal canal narrowing.   3. At C2-C3, mild to moderate bilateral neural foraminal narrowing with focal impingement of the exiting C3 nerve root on the left.   4. At C3-C4, mild to moderate bilateral neural foraminal narrowing with impingement of the bilateral exiting C4 nerve roots.   5. At C4-C5, mild to moderate bilateral neural foraminal narrowing with focal impingement of the exiting C4 nerve root on the left.   6. At C5-C6 and C6-C7 (surgical levels), mild and mild-to-moderate neuroforaminal narrowing without exiting nerve root impingement.   7. At C7-T1, moderate to severe left neural foraminal narrowing with impingement of the exiting C8 nerve root on the left      See problem list for active medical problems.  Problems  all longstanding and stable, except as noted/documented.  See ROS for pertinent symptoms related to these conditions.      MEDICAL HISTORY:     Patient Active Problem List    Diagnosis Date Noted     History of alcoholism (H) 07/26/2019     Priority: Medium     Has been sober Nov 2014       CATALINA (obstructive sleep apnea) 05/09/2018     Priority: Medium     Sleep apnea 05/09/2018     Priority: Medium     Overview:   does not use CPAP due to insomnia       Spondylolisthesis of lumbar region 05/09/2018     Priority: Medium     Colitis 02/26/2018     Priority: Medium     S/P lumbar fusion 06/09/2017     Priority: Medium     Lumbar stenosis 05/30/2017     Priority: Medium     Advance Care Planning 03/08/2017     Priority: Medium     Advance Care Planning 7/5/2017: Recommend Code Status in chart and patient's Advance Care Planning documents be reviewed to ensure alignment with patient's current wishes. Added by Mary Kay Forman [facility] Advance Care Planning Liaison  Advance Care Planning 3/8/2017: ACP Review of Chart / Resources Provided:  Reviewed chart for advance care plan.  Vesta Rodriguez has been provided information and resources to begin or update their advance care plan.  Added by Vesta Bradley       Major depressive disorder, recurrent episode, moderate (H) 10/24/2016     Priority: Medium     S/P cholecystectomy 08/04/2016     Priority: Medium     Graves disease 01/25/2016     Priority: Medium     H/O vertebroplasty 08/12/2013     Priority: Medium     Thyroid nodule; on outsie dc,due for f/u 5/13 04/19/2013     Priority: Medium     ultrasound may , 2013  Due for followup          Compression fracture 01/16/2013     Priority: Medium     GERD (gastroesophageal reflux disease) 01/16/2013     Priority: Medium     H/O of right ureteral stone 10/05/2012     Priority: Medium     8/1612         Chronic cholecystitis 09/04/2012     Priority: Medium     H/O: hysterectomy 08/19/2012     Priority: Medium     Tobacco user  08/28/2011     Priority: Medium     Family history of breast cancer in mother 08/28/2011     Priority: Medium     Vitamin D deficiency 08/28/2011     Priority: Medium     CARDIOVASCULAR SCREENING; LDL GOAL LESS THAN 130 10/31/2010     Priority: Medium     COPD (chronic obstructive pulmonary disease); spirometry 3/10 ; rare use of mdi 03/15/2010     Priority: Medium     Anxiety 09/26/2008     Priority: Medium     Disturbance in sleep behavior 10/08/2007     Priority: Medium     Has failed mult meds, - lunesta, ambien, klonopin  etoh treatment 2013  Not candidate for klonopin      Problem list name updated by automated process. Provider to review       Displacement of cervical intervertebral disc without myelopathy 10/08/2007     Priority: Medium     Cervical fusion. [place] [facility].     2004       Restless legs syndrome (RLS) 07/30/2007     Priority: Medium     On mirapex, unable to go off     0.5 mg twice a day , for rls        Displacement of lumbar intervertebral disc without myelopathy 02/20/2006     Priority: Medium     2 disc fusion        Brachial neuritis or radiculitis 07/06/2005     Priority: Medium     Problem list name updated by automated process. Provider to review       Osteoporosis 07/06/2005     Priority: Medium     Hip fracture after bone graft.     forteo 3/09 - until summer 2010  Problem list name updated by automated process. Provider to review        Past Medical History:   Diagnosis Date     Anemia      Anemia due to blood loss, acute 10/21/2012     Anxiety      Back pain      Chronic cholecystitis      Degeneration of cervical intervertebral disc     04     Degeneration of lumbar or lumbosacral intervertebral disc      Major depression in remission (H)      Osteoporosis      Right ureteral stone      RLS (restless legs syndrome)      Sleep disturbance      UTI (urinary tract infection)     RECENT- ON ANTIBIOTIC, STILL BACTERIA IN URINE     Vitamin D deficiency      Past Surgical History:    Procedure Laterality Date     FUSION CERVICAL ANTERIOR TWO LEVELS      C 5-7     FUSION LUMBAR ANTERIOR THREE+ LEVELS      L3- SI     HYSTERECTOMY, DEYSI      no oophorectomy     INJECT EPIDURAL TRANSFORAMINAL Bilateral 3/9/2017    Procedure: INJECT EPIDURAL TRANSFORAMINAL;  Surgeon: Pérez Valle MD;  Location: [facility]     INJECT EPIDURAL TRANSFORAMINAL N/A 2018    Procedure: INJECT EPIDURAL TRANSFORAMINAL;  transforaminal epidural steroid injection, thoracic 12 - lumbar 1;  Surgeon: Pérez Valle MD;  Location: [facility]     LAPAROSCOPIC CHOLECYSTECTOMY  2012    Procedure: LAPAROSCOPIC CHOLECYSTECTOMY;  Laparoscopic vs Open Cholecystectomy;  Surgeon: Esperanza Quinn MD;  Location: [facility]     LASER HOLMIUM LITHOTRIPSY URETER(S), INSERT STENT, COMBINED  2012    Procedure: COMBINED CYSTOSCOPY, URETEROSCOPY, LASER HOLMIUM LITHOTRIPSY URETER(S), INSERT STENT;  Cystoscopy, Right Ureteroscopy Holmium Laser Lithotripsy , right ureteral stent placement *Latex Safe*;  Surgeon: Joshua Rm MD;  Location: [facility]     SURGICAL HISTORY OF -        section     SURGICAL HISTORY OF -       Tubal ligation     SURGICAL HISTORY OF -       Post hip fracture     SURGICAL HISTORY OF -       hardware removal, right iliac crest      VERTEBROPLASTY       Current Outpatient Medications   Medication Sig Dispense Refill     calcium carbonate (CALCIUM CARBONATE) 600 MG tablet Take 600 mg by mouth       cyclobenzaprine (FLEXERIL) 10 MG tablet TAKE ONE TABLET BY MOUTH THREE TIMES A DAY FOR 10 DAYS  0     HYDROcodone-acetaminophen (NORCO) 5-325 MG tablet Take 1 tablet by mouth every 6 hours as needed for severe pain 10 tablet 0     hydrOXYzine (ATARAX) 25 MG tablet Take 25-50 mg by mouth 3 times daily as needed for itching       pramipexole (MIRAPEX) 1.5 MG tablet TAKE ONE TABLET BY MOUTH EVERY MORNING AND TAKE TWO TABLETS BY MOUTH AT BEDTIME 90 tablet 3     topiramate (TOPAMAX) 25 MG  tablet Take 2 tablets (50 mg) by mouth 2 times daily 60 tablet 0     vitamin D2 (ERGOCALCIFEROL) 03459 units (1250 mcg) capsule Take 50,000 Units by mouth once a week       methylPREDNISolone (MEDROL DOSEPAK) 4 MG tablet therapy pack        order for DME Equipment being ordered: CPAP       OTC products: None, except as noted above    Allergies   Allergen Reactions     Ambien [Zolpidem Tartrate]      Mental status changes     Asa [Aspirin]      Accidental overdose.     Celexa [Citalopram Hydrobromide]      review with pt., i cant' find side effect in chart. 06 Sylvie Mederos MD       Doxychel [Doxycycline Hyclate]      Gabapentin      Rectalurgency      Nitroglycerin Other (See Comments)     Vomiting     Tape [Adhesive Tape]      Zoloft      Wt gain       Latex Allergy: NO    Social History     Tobacco Use     Smoking status: Former Smoker     Packs/day: 0.50     Types: Cigarettes     Last attempt to quit: 2018     Years since quittin.5     Smokeless tobacco: Never Used   Substance Use Topics     Alcohol use: No     Alcohol/week: 0.0 standard drinks     History   Drug Use No       REVIEW OF SYSTEMS:   Constitutional, HEENT, cardiovascular, pulmonary, gi and gu systems are negative, except as otherwise noted.    EXAM:   /80   Pulse 81   Temp 97.9  F (36.6  C) (Tympanic)   Resp 12   Wt 72.6 kg (160 lb)   SpO2 96%   BMI 29.99 kg/m      GENERAL APPEARANCE: healthy, alert and no distress     EYES: EOMI, PERRL     HENT: ear canals and TM's normal and nose and mouth without ulcers or lesions     NECK: no adenopathy, no asymmetry, masses, or scars and thyroid normal to palpation     RESP: lungs clear to auscultation - no rales, rhonchi or wheezes     CV: regular rates and rhythm, normal S1 S2, no S3 or S4 and no murmur, click or rub     ABDOMEN:  soft, nontender, no HSM or masses and bowel sounds normal     MS: extremities normal- no gross deformities noted, no evidence of inflammation in joints,  FROM in all extremities.     SKIN: no suspicious lesions or rashes     NEURO: Normal strength and tone, sensory exam grossly normal, mentation intact and speech normal     PSYCH: mentation appears normal. and affect normal/bright     LYMPHATICS: No cervical adenopathy    DIAGNOSTICS:   EKG: appears normal, NSR, unchanged from previous tracings (done 1/3/20)    Lab Results   Component Value Date    WBC 14.8 03/20/2020     Lab Results   Component Value Date    RBC 4.49 03/20/2020     Lab Results   Component Value Date    HGB 13.2 03/20/2020     Lab Results   Component Value Date    HCT 41.1 03/20/2020     No components found for: MCT  Lab Results   Component Value Date    MCV 92 03/20/2020     Lab Results   Component Value Date    MCH 29.4 03/20/2020     Lab Results   Component Value Date    MCHC 32.1 03/20/2020     Lab Results   Component Value Date    RDW 14.7 03/20/2020     Lab Results   Component Value Date     03/20/2020     Last Comprehensive Metabolic Panel:  Sodium   Date Value Ref Range Status   03/20/2020 138 133 - 144 mmol/L Final     Potassium   Date Value Ref Range Status   03/20/2020 3.8 3.4 - 5.3 mmol/L Final     Chloride   Date Value Ref Range Status   03/20/2020 107 94 - 109 mmol/L Final     Carbon Dioxide   Date Value Ref Range Status   03/20/2020 29 20 - 32 mmol/L Final     Anion Gap   Date Value Ref Range Status   03/20/2020 2 (L) 3 - 14 mmol/L Final     Glucose   Date Value Ref Range Status   03/20/2020 134 (H) 70 - 99 mg/dL Final     Comment:     Fasting specimen     Urea Nitrogen   Date Value Ref Range Status   03/20/2020 18 7 - 30 mg/dL Final     Creatinine   Date Value Ref Range Status   03/20/2020 0.56 0.52 - 1.04 mg/dL Final     GFR Estimate   Date Value Ref Range Status   03/20/2020 >90 >60 mL/min/[1.73_m2] Final     Comment:     Non  GFR Calc  Starting 12/18/2018, serum creatinine based estimated GFR (eGFR) will be   calculated using the Chronic Kidney Disease  Epidemiology Collaboration   (CKD-EPI) equation.       Calcium   Date Value Ref Range Status   03/20/2020 9.0 8.5 - 10.1 mg/dL Final         XR CHEST 2 VW 3/20/2020 2:03 PM     HISTORY: Pain.     COMPARISON: Several prior studies, most recent one being dated  10/12/2018.                                                                      IMPRESSION: 2 views of the chest show no acute cardiopulmonary  disease. Mild linear atelectasis is seen at the lung bases.     NEGRO MORALES MD  IMPRESSION:   Reason for surgery/procedure: cervicalgia, cervical spine stenosis   Diagnosis/reason for consult: pre op clearance    The proposed surgical procedure is considered INTERMEDIATE risk.    REVISED CARDIAC RISK INDEX  The patient has the following serious cardiovascular risks for perioperative complications such as (MI, PE, VFib and 3  AV Block):  No serious cardiac risks  INTERPRETATION: 0 risks: Class I (very low risk - 0.4% complication rate)    The patient has the following additional risks for perioperative complications:  HX sleep apnea unable to tolerate CPAP          ICD-10-CM    1. Preop general physical exam  Z01.818    2. Neck pain  M54.2 topiramate (TOPAMAX) 25 MG tablet     CBC with platelets differential     Basic metabolic panel     XR Chest 2 Views     HYDROcodone-acetaminophen (NORCO) 5-325 MG tablet   3. Cervical stenosis of spine  M48.02    4. Chronic bronchitis, unspecified chronic bronchitis type (H)  J42    5. CATALINA (obstructive sleep apnea)  G47.33    6. Leukocytosis, unspecified type  D72.829 Noted on labs suspect due to dexamethasone no signs of infection        RECOMMENDATIONS:         --Patient is to take all scheduled medications on the day of surgery EXCEPT for modifications listed below.    APPROVAL GIVEN to proceed with proposed procedure, without further diagnostic evaluation       Signed Electronically by: Ana Paula Mccray NP    Copy of this evaluation report is provided to requesting  physician.    [facility] Preop Guidelines    Revised Cardiac Risk Index

## 2020-03-20 NOTE — NURSING NOTE
"Chief Complaint   Patient presents with     Pre-Op Exam     BP (!) 142/90   Pulse 81   Temp 97.9  F (36.6  C) (Tympanic)   Resp 12   Wt 72.6 kg (160 lb)   SpO2 96%   BMI 29.99 kg/m   Estimated body mass index is 29.99 kg/m  as calculated from the following:    Height as of 1/3/20: 1.556 m (5' 1.25\").    Weight as of this encounter: 72.6 kg (160 lb).  Patient presents to the clinic using No DME      Health Maintenance that is potentially due pending provider review:    Health Maintenance Due   Topic Date Due     URINE DRUG SCREEN  1960     HIV SCREENING  12/17/1976     MEDICARE ANNUAL WELLNESS VISIT  12/17/1979     ZOSTER IMMUNIZATION (1 of 2) 12/17/2011     DTAP/TDAP/TD IMMUNIZATION (2 - Td) 04/07/2019     INFLUENZA VACCINE (1) 09/01/2019     PHQ-9  01/05/2020        n/a        "

## 2020-03-20 NOTE — PATIENT INSTRUCTIONS
Labs anc Chest xray completed today   Cleared for surgery pending lab results   No ASA, ibuprofen or NSAIDs now until after surgery     Before Your Surgery      Call your surgeon if there is any change in your health. This includes signs of a cold or flu (such as a sore throat, runny nose, cough, rash or fever).    Do not smoke, drink alcohol or take over the counter medicine (unless your surgeon or primary care doctor tells you to) for the 24 hours before and after surgery.    If you take prescribed drugs: Follow your doctor s orders about which medicines to take and which to stop until after surgery.    Eating and drinking prior to surgery: follow the instructions from your surgeon    Take a shower or bath the night before surgery. Use the soap your surgeon gave you to gently clean your skin. If you do not have soap from your surgeon, use your regular soap. Do not shave or scrub the surgery site.  Wear clean pajamas and have clean sheets on your bed.

## 2020-03-23 RX ORDER — HYDROCODONE BITARTRATE AND ACETAMINOPHEN 5; 325 MG/1; MG/1
1 TABLET ORAL EVERY 6 HOURS PRN
Qty: 10 TABLET | Refills: 0 | Status: SHIPPED | OUTPATIENT
Start: 2020-03-23 | End: 2020-05-05

## 2020-05-05 ENCOUNTER — OFFICE VISIT (OUTPATIENT)
Dept: FAMILY MEDICINE | Facility: CLINIC | Age: 60
End: 2020-05-05
Payer: MEDICARE

## 2020-05-05 VITALS
WEIGHT: 153 LBS | DIASTOLIC BLOOD PRESSURE: 70 MMHG | HEIGHT: 61 IN | HEART RATE: 72 BPM | RESPIRATION RATE: 18 BRPM | BODY MASS INDEX: 28.89 KG/M2 | TEMPERATURE: 97.8 F | SYSTOLIC BLOOD PRESSURE: 134 MMHG

## 2020-05-05 DIAGNOSIS — M54.2 NECK PAIN: Primary | ICD-10-CM

## 2020-05-05 DIAGNOSIS — N30.01 ACUTE CYSTITIS WITH HEMATURIA: ICD-10-CM

## 2020-05-05 LAB
ALBUMIN UR-MCNC: NEGATIVE MG/DL
AMORPH CRY #/AREA URNS HPF: ABNORMAL /HPF
APPEARANCE UR: ABNORMAL
BACTERIA #/AREA URNS HPF: ABNORMAL /HPF
BILIRUB UR QL STRIP: NEGATIVE
COLOR UR AUTO: YELLOW
GLUCOSE UR STRIP-MCNC: NEGATIVE MG/DL
HGB UR QL STRIP: ABNORMAL
KETONES UR STRIP-MCNC: NEGATIVE MG/DL
LEUKOCYTE ESTERASE UR QL STRIP: ABNORMAL
NITRATE UR QL: POSITIVE
NON-SQ EPI CELLS #/AREA URNS LPF: ABNORMAL /LPF
PH UR STRIP: 7 PH (ref 5–7)
RBC #/AREA URNS AUTO: ABNORMAL /HPF
SOURCE: ABNORMAL
SP GR UR STRIP: 1.02 (ref 1–1.03)
UROBILINOGEN UR STRIP-ACNC: 0.2 EU/DL (ref 0.2–1)
WBC #/AREA URNS AUTO: ABNORMAL /HPF

## 2020-05-05 PROCEDURE — 87088 URINE BACTERIA CULTURE: CPT | Performed by: NURSE PRACTITIONER

## 2020-05-05 PROCEDURE — 87186 SC STD MICRODIL/AGAR DIL: CPT | Performed by: NURSE PRACTITIONER

## 2020-05-05 PROCEDURE — 99213 OFFICE O/P EST LOW 20 MIN: CPT | Performed by: NURSE PRACTITIONER

## 2020-05-05 PROCEDURE — 87086 URINE CULTURE/COLONY COUNT: CPT | Performed by: NURSE PRACTITIONER

## 2020-05-05 PROCEDURE — 81001 URINALYSIS AUTO W/SCOPE: CPT | Performed by: NURSE PRACTITIONER

## 2020-05-05 RX ORDER — SULFAMETHOXAZOLE/TRIMETHOPRIM 800-160 MG
1 TABLET ORAL 2 TIMES DAILY
Qty: 10 TABLET | Refills: 0 | Status: SHIPPED | OUTPATIENT
Start: 2020-05-05 | End: 2020-05-05

## 2020-05-05 RX ORDER — TOPIRAMATE 25 MG/1
50 TABLET, FILM COATED ORAL 2 TIMES DAILY
Qty: 60 TABLET | Refills: 0 | Status: SHIPPED | OUTPATIENT
Start: 2020-05-05

## 2020-05-05 RX ORDER — ALBUTEROL SULFATE 90 UG/1
2 AEROSOL, METERED RESPIRATORY (INHALATION) EVERY 6 HOURS
COMMUNITY
End: 2020-05-12

## 2020-05-05 RX ORDER — SULFAMETHOXAZOLE/TRIMETHOPRIM 800-160 MG
1 TABLET ORAL 2 TIMES DAILY
Qty: 14 TABLET | Refills: 0 | Status: SHIPPED | OUTPATIENT
Start: 2020-05-05 | End: 2020-05-12

## 2020-05-05 ASSESSMENT — MIFFLIN-ST. JEOR: SCORE: 1215.34

## 2020-05-05 NOTE — PATIENT INSTRUCTIONS
Urinalysis positive for Urinary Tract Infection - will await culture and update you on results and recommendations     Will treat with 7 days of antibiotics     Push fluids     Aleve is fine for discomfort for now

## 2020-05-05 NOTE — PROGRESS NOTES
Subjective     Vesta KAYCEE Michael is a 59 year old female who presents to clinic today for the following health issues:    HPI    FLANK PAIN     Onset: 1.5 WEEKS    Description:   Character: Sharp and Dull ache  Location: right flank  Radiation: None    Intensity: moderate    Progression of Symptoms:  same    Accompanying Signs & Symptoms:  Fever/Chills?: no   Gas/Bloating: YES- Gas  Nausea: no   Vomitting: no   Diarrhea?: YES - no blood noted   Constipation:no   Dysuria or Hematuria: no    History:   Trauma: no   Previous similar pain: YES- kidney stones   Previous tests done: CT    Precipitating factors:   Does the pain change with:     Food: no      BM: no - last bowel movement this a.m. loose for some time     Urination: no     Alleviating factors:  nothing    Therapies Tried and outcome: aleve    LMP:  not applicable     Has had 3 episodes of kidney stones in the past - last in 2012  Cloudy urine with odor recently, with urgency and frequency - denies painful urination      Patient Active Problem List   Diagnosis     Brachial neuritis or radiculitis     Osteoporosis     Displacement of lumbar intervertebral disc without myelopathy     Restless legs syndrome (RLS)     Disturbance in sleep behavior     Displacement of cervical intervertebral disc without myelopathy     Anxiety     CARDIOVASCULAR SCREENING; LDL GOAL LESS THAN 130     Tobacco user     Family history of breast cancer in mother     Vitamin D deficiency     Chronic cholecystitis     H/O of right ureteral stone     Thyroid nodule; on outsie dc,due for f/u 5/13     H/O vertebroplasty     COPD (chronic obstructive pulmonary disease); spirometry 3/10 ; rare use of mdi     Graves disease     S/P cholecystectomy     Major depressive disorder, recurrent episode, moderate (H)     Advance Care Planning     Colitis     Compression fracture     GERD (gastroesophageal reflux disease)     H/O: hysterectomy     Lumbar stenosis     CATALINA (obstructive sleep apnea)      Sleep apnea     S/P lumbar fusion     Spondylolisthesis of lumbar region     History of alcoholism (H)     Past Surgical History:   Procedure Laterality Date     FUSION CERVICAL ANTERIOR TWO LEVELS      C 5-7     FUSION LUMBAR ANTERIOR THREE+ LEVELS      L3- SI     HYSTERECTOMY, DEYSI      no oophorectomy     INJECT EPIDURAL TRANSFORAMINAL Bilateral 3/9/2017    Procedure: INJECT EPIDURAL TRANSFORAMINAL;  Surgeon: Pérez Valle MD;  [facility]     INJECT EPIDURAL TRANSFORAMINAL N/A 2018    Procedure: INJECT EPIDURAL TRANSFORAMINAL;  transforaminal epidural steroid injection, thoracic 12 - lumbar 1;  Surgeon: Pérez Valle MD;  [facility]     LAPAROSCOPIC CHOLECYSTECTOMY  2012    Procedure: LAPAROSCOPIC CHOLECYSTECTOMY;  Laparoscopic vs Open Cholecystectomy;  Surgeon: Esperanza Quinn MD;  [facility]: [facility]     LASER HOLMIUM LITHOTRIPSY URETER(S), INSERT STENT, COMBINED  2012    Procedure: COMBINED CYSTOSCOPY, URETEROSCOPY, LASER HOLMIUM LITHOTRIPSY URETER(S), INSERT STENT;  Cystoscopy, Right Ureteroscopy Holmium Laser Lithotripsy , right ureteral stent placement *Latex Safe*;  Surgeon: Joshua Rm MD;  [facility]     SURGICAL HISTORY OF -        section     SURGICAL HISTORY OF -       Tubal ligation     SURGICAL HISTORY OF -       Post hip fracture     SURGICAL HISTORY OF -       hardware removal, right iliac crest      VERTEBROPLASTY         Social History     Tobacco Use     Smoking status: Former Smoker     Packs/day: 0.50     Types: Cigarettes     Last attempt to quit: 2018     Years since quittin.6     Smokeless tobacco: Never Used   Substance Use Topics     Alcohol use: No     Alcohol/week: 0.0 standard drinks     Family History   Problem Relation Age of Onset     Heart Disease Brother      Breast Cancer Mother          age 31     Alcohol/Drug Other          Current Outpatient Medications   Medication Sig Dispense Refill  "    albuterol (PROAIR HFA/PROVENTIL HFA/VENTOLIN HFA) 108 (90 Base) MCG/ACT inhaler Inhale 2 puffs into the lungs every 6 hours       calcium carbonate (CALCIUM CARBONATE) 600 MG tablet Take 600 mg by mouth       cyclobenzaprine (FLEXERIL) 10 MG tablet TAKE ONE TABLET BY MOUTH THREE TIMES A DAY FOR 10 DAYS  0     Fluticasone-Salmeterol (ADVAIR HFA IN)        hydrOXYzine (ATARAX) 25 MG tablet Take 25-50 mg by mouth 3 times daily as needed for itching       order for DME Equipment being ordered: CPAP       pramipexole (MIRAPEX) 1.5 MG tablet TAKE ONE TABLET BY MOUTH EVERY MORNING AND TAKE TWO TABLETS BY MOUTH AT BEDTIME 90 tablet 3     sulfamethoxazole-trimethoprim (BACTRIM DS) 800-160 MG tablet Take 1 tablet by mouth 2 times daily for 7 days 14 tablet 0     topiramate (TOPAMAX) 25 MG tablet Take 2 tablets (50 mg) by mouth 2 times daily 60 tablet 0     vitamin D2 (ERGOCALCIFEROL) 04274 units (1250 mcg) capsule Take 50,000 Units by mouth once a week       Allergies   Allergen Reactions     Ambien [Zolpidem Tartrate]      Mental status changes     Asa [Aspirin]      Accidental overdose.     Celexa [Citalopram Hydrobromide]      review with pt., i cant' find side effect in chart. 2/9/06 Sylvie Mederos MD       Doxychel [Doxycycline Hyclate]      Gabapentin      Rectalurgency      Nitroglycerin Other (See Comments)     Vomiting     Tape [Adhesive Tape]      Zoloft      Wt gain        Reviewed and updated as needed this visit by Provider  Tobacco  Allergies  Meds  Problems  Med Hx  Surg Hx  Fam Hx         Review of Systems   ROS COMP: Constitutional, HEENT, cardiovascular, pulmonary, gi and gu systems are negative, except as otherwise noted.      Objective    /70 (BP Location: Right arm, Cuff Size: Adult Regular)   Pulse 72   Temp 97.8  F (36.6  C) (Tympanic)   Resp 18   Ht 1.556 m (5' 1.25\")   Wt 69.4 kg (153 lb)   BMI 28.67 kg/m    Body mass index is 28.67 kg/m .  Physical Exam   GENERAL APPEARANCE: " healthy, alert and mild distress - moving slowly, recently had neck surgery  RESP: lungs clear to auscultation - no rales, rhonchi or wheezes  CV: regular rates and rhythm, normal S1 S2, no S3 or S4 and no murmur, click or rub  ABDOMEN: soft, generalized abdominal tenderness, without hepatosplenomegaly or masses and bowel sounds normal  MS: extremities normal- no gross deformities noted and mild right CVA tenderness  PSYCH: mentation appears normal and affect normal/bright    Diagnostic Test Results:  Labs reviewed in Epic  Results for orders placed or performed in visit on 05/05/20 (from the past 24 hour(s))   *UA reflex to Microscopic and Culture ([place] and [facility] (except [place] and [place])    Specimen: Midstream Urine   Result Value Ref Range    Color Urine Yellow     Appearance Urine Slightly Cloudy     Glucose Urine Negative NEG^Negative mg/dL    Bilirubin Urine Negative NEG^Negative    Ketones Urine Negative NEG^Negative mg/dL    Specific Gravity Urine 1.025 1.003 - 1.035    Blood Urine Trace (A) NEG^Negative    pH Urine 7.0 5.0 - 7.0 pH    Protein Albumin Urine Negative NEG^Negative mg/dL    Urobilinogen Urine 0.2 0.2 - 1.0 EU/dL    Nitrite Urine Positive (A) NEG^Negative    Leukocyte Esterase Urine Moderate (A) NEG^Negative    Source Midstream Urine    Urine Microscopic   Result Value Ref Range    WBC Urine 10-25 (A) OTO5^0 - 5 /HPF    RBC Urine 2-5 (A) OTO2^O - 2 /HPF    Squamous Epithelial /LPF Urine Few FEW^Few /LPF    Bacteria Urine Moderate (A) NEG^Negative /HPF    Amorphous Crystals Moderate (A) NEG^Negative /HPF           Assessment & Plan     1. Acute cystitis with hematuria  Acute, treat with antibiotics and await culture. Encouraged to drink lots of fluids and return to clinic if symptoms significantly worsening.   - *UA reflex to Microscopic and Culture ([place] and [facility] (except [place] and [place])  - Urine Microscopic  - Urine Culture Aerobic Bacterial  -  "sulfamethoxazole-trimethoprim (BACTRIM DS) 800-160 MG tablet; Take 1 tablet by mouth 2 times daily for 7 days  Dispense: 14 tablet; Refill: 0    2. Neck pain  Chronic, recent neck surgery in March. Refill .  - topiramate (TOPAMAX) 25 MG tablet; Take 2 tablets (50 mg) by mouth 2 times daily  Dispense: 60 tablet; Refill: 0     BMI:   Estimated body mass index is 28.67 kg/m  as calculated from the following:    Height as of this encounter: 1.556 m (5' 1.25\").    Weight as of this encounter: 69.4 kg (153 lb).   Weight management plan: Not addressed at this office visit        Patient Instructions   Urinalysis positive for Urinary Tract Infection - will await culture and update you on results and recommendations     Will treat with 7 days of antibiotics     Push fluids     Aleve is fine for discomfort for now       Return in about 1 week (around 5/12/2020), or if symptoms worsen or fail to improve.    JAVIER Sanches [facility]      "

## 2020-05-06 ENCOUNTER — TELEPHONE (OUTPATIENT)
Dept: FAMILY MEDICINE | Facility: CLINIC | Age: 60
End: 2020-05-06

## 2020-05-06 LAB
BACTERIA SPEC CULT: ABNORMAL
Lab: ABNORMAL
SPECIMEN SOURCE: ABNORMAL

## 2020-05-06 NOTE — TELEPHONE ENCOUNTER
Reason for call:  Patient reporting a symptom    Symptom or request: Pt was seen yesterday for UTI. She was calling to see if culture was back. She says the pain is much worse today. She went to ED in [place] this morning because the pain was so bad and they did a CT scan. She doesn't have any stone. They did give her some Toradol in her IV and that did help.    Have you been treated for this before? Yes    Additional comments:      Phone Number patient can be reached at:  Home number on file [phone] (home)    Best Time:  anytime  Call taken on 5/6/2020 at 2:43 PM by May Higginbotham

## 2020-05-07 ENCOUNTER — TELEPHONE (OUTPATIENT)
Dept: FAMILY MEDICINE | Facility: CLINIC | Age: 60
End: 2020-05-07

## 2020-05-07 DIAGNOSIS — J42 CHRONIC BRONCHITIS, UNSPECIFIED CHRONIC BRONCHITIS TYPE (H): ICD-10-CM

## 2020-05-07 DIAGNOSIS — N30.01 ACUTE CYSTITIS WITH HEMATURIA: Primary | ICD-10-CM

## 2020-05-07 RX ORDER — CIPROFLOXACIN 500 MG/1
500 TABLET, FILM COATED ORAL 2 TIMES DAILY
Qty: 14 TABLET | Refills: 0 | Status: SHIPPED | OUTPATIENT
Start: 2020-05-07 | End: 2020-05-14

## 2020-05-07 NOTE — TELEPHONE ENCOUNTER
Vesta left a vm stating she is calling for her urine culture results.    Alivia [facility]-Station

## 2020-05-07 NOTE — TELEPHONE ENCOUNTER
I called and spoke with patient.   Urine grew out e coli not sensitive to bactrim.   Switched to cipro to cover kidneys in light of worsening flank pain   Encouraged to push fluids   She will follow up If symptoms are not return.      She also requested a refill on her advair inhaler. This was sent     Marian Mccray NP

## 2020-05-07 NOTE — TELEPHONE ENCOUNTER
05-05-20 OV, UTI- started Bactrim DS.   05-06-20 ED visit, [facility]- flank pain. See note, CT abd/ pelvis results.   See UC/ S results- bactrim resistant.  Reports persistent rt side flank pain. No fevers/ chills.  Forwarded to Marian in Ladonna's absence.  SHAREE Thomas RN

## 2020-05-08 DIAGNOSIS — N30.01 ACUTE CYSTITIS WITH HEMATURIA: Primary | ICD-10-CM

## 2020-05-08 RX ORDER — NITROFURANTOIN 25; 75 MG/1; MG/1
100 CAPSULE ORAL 2 TIMES DAILY
Qty: 10 CAPSULE | Refills: 0 | Status: SHIPPED | OUTPATIENT
Start: 2020-05-08 | End: 2020-05-08

## 2020-05-12 ENCOUNTER — VIRTUAL VISIT (OUTPATIENT)
Dept: FAMILY MEDICINE | Facility: CLINIC | Age: 60
End: 2020-05-12
Payer: MEDICARE

## 2020-05-12 DIAGNOSIS — F17.200 SMOKER: ICD-10-CM

## 2020-05-12 DIAGNOSIS — R06.09 DYSPNEA ON EXERTION: ICD-10-CM

## 2020-05-12 DIAGNOSIS — J42 CHRONIC BRONCHITIS, UNSPECIFIED CHRONIC BRONCHITIS TYPE (H): ICD-10-CM

## 2020-05-12 DIAGNOSIS — R91.8 PULMONARY NODULES: ICD-10-CM

## 2020-05-12 DIAGNOSIS — N30.01 ACUTE CYSTITIS WITH HEMATURIA: Primary | ICD-10-CM

## 2020-05-12 PROCEDURE — 99441 ZZC PHYSICIAN TELEPHONE EVALUATION 5-10 MIN: CPT | Performed by: NURSE PRACTITIONER

## 2020-05-12 RX ORDER — ALBUTEROL SULFATE 90 UG/1
2 AEROSOL, METERED RESPIRATORY (INHALATION) EVERY 4 HOURS PRN
Qty: 1 INHALER | Refills: 0 | Status: SHIPPED | OUTPATIENT
Start: 2020-05-12

## 2020-05-12 NOTE — PROGRESS NOTES
"Vesta Rodriguez is a 59 year old female who is being evaluated via a billable telephone visit.      The patient has been notified of following:     \"This telephone visit will be conducted via a call between you and your physician/provider. We have found that certain health care needs can be provided without the need for a physical exam.  This service lets us provide the care you need with a short phone conversation.  If a prescription is necessary we can send it directly to your pharmacy.  If lab work is needed we can place an order for that and you can then stop by our lab to have the test done at a later time.    Telephone visits are billed at different rates depending on your insurance coverage. During this emergency period, for some insurers they may be billed the same as an in-person visit.  Please reach out to your insurance provider with any questions.    If during the course of the call the physician/provider feels a telephone visit is not appropriate, you will not be charged for this service.\"    Patient has given verbal consent for Telephone visit?  Yes    What phone number would you like to be contacted at? [phone]    How would you like to obtain your AVS? Mail a copy    Subjective     Vesta Rodriguez is a 59 year old female who presents to clinic today for the following health issues:    HPI     * Wants to discuss urine test.  Wondering if she has to come in for a UA after she is done with the antibiotics.  Has 2 days remaining on the antibiotic, is still having right flank pain and urine sometimes cloudy.  Is drinking plenty of fluids and cran/grape juice.  Did  nitrofurantoin in case Cipro didn't work     Odor is gone       * Nodules in lungs for over a year.  Wondering what she can do, has had a chest tube in the past.  Difficulty breathing for over a year. Also has COPD history    Started smoking again in October- 1/2 PPD   quit for 1 year   She had a CT scan of abdomen and pelvis small " pulmonary nodules are noted     She is on advair twice a day   Does not have an albuterol inhaler to use currently       Patient Active Problem List   Diagnosis     Brachial neuritis or radiculitis     Osteoporosis     Displacement of lumbar intervertebral disc without myelopathy     Restless legs syndrome (RLS)     Disturbance in sleep behavior     Displacement of cervical intervertebral disc without myelopathy     Anxiety     CARDIOVASCULAR SCREENING; LDL GOAL LESS THAN 130     Tobacco user     Family history of breast cancer in mother     Vitamin D deficiency     Chronic cholecystitis     H/O of right ureteral stone     Thyroid nodule; on outsie dc,due for f/u 5/13     H/O vertebroplasty     COPD (chronic obstructive pulmonary disease); spirometry 3/10 ; rare use of mdi     Graves disease     S/P cholecystectomy     Major depressive disorder, recurrent episode, moderate (H)     Advance Care Planning     Colitis     Compression fracture     GERD (gastroesophageal reflux disease)     H/O: hysterectomy     Lumbar stenosis     CATALINA (obstructive sleep apnea)     Sleep apnea     S/P lumbar fusion     Spondylolisthesis of lumbar region     History of alcoholism (H)     Past Surgical History:   Procedure Laterality Date     FUSION CERVICAL ANTERIOR TWO LEVELS      C 5-7     FUSION LUMBAR ANTERIOR THREE+ LEVELS      L3- SI     HYSTERECTOMY, DEYSI  1993    no oophorectomy     INJECT EPIDURAL TRANSFORAMINAL Bilateral 3/9/2017    Procedure: INJECT EPIDURAL TRANSFORAMINAL;  Surgeon: Pérez Valle MD;  [facility]     INJECT EPIDURAL TRANSFORAMINAL N/A 5/24/2018    Procedure: INJECT EPIDURAL TRANSFORAMINAL;  transforaminal epidural steroid injection, thoracic 12 - lumbar 1;  Surgeon: Pérez Valle MD;  [facility]     LAPAROSCOPIC CHOLECYSTECTOMY  9/12/2012    Procedure: LAPAROSCOPIC CHOLECYSTECTOMY;  Laparoscopic vs Open Cholecystectomy;  Surgeon: Esperanza Quinn MD;  Location: [facility]     LASER HOLMIUM  LITHOTRIPSY URETER(S), INSERT STENT, COMBINED  2012    Procedure: COMBINED CYSTOSCOPY, URETEROSCOPY, LASER HOLMIUM LITHOTRIPSY URETER(S), INSERT STENT;  Cystoscopy, Right Ureteroscopy Holmium Laser Lithotripsy , right ureteral stent placement *Latex Safe*;  Surgeon: Joshua Rm MD;  [facility]     SURGICAL HISTORY OF -        section     SURGICAL HISTORY OF -       Tubal ligation     SURGICAL HISTORY OF -       Post hip fracture     SURGICAL HISTORY OF -       hardware removal, right iliac crest      VERTEBROPLASTY         Social History     Tobacco Use     Smoking status: Former Smoker     Packs/day: 0.50     Types: Cigarettes     Last attempt to quit: 2018     Years since quittin.6     Smokeless tobacco: Never Used   Substance Use Topics     Alcohol use: No     Alcohol/week: 0.0 standard drinks     Family History   Problem Relation Age of Onset     Heart Disease Brother      Breast Cancer Mother          age 31     Alcohol/Drug Other            Reviewed and updated as needed this visit by Provider         Review of Systems   Constitutional, HEENT, cardiovascular, pulmonary, gi and gu systems are negative, except as otherwise noted.       Objective   Reported vitals:  There were no vitals taken for this visit.   healthy, alert and no distress  PSYCH: Alert and oriented times 3; coherent speech, normal   rate and volume, able to articulate logical thoughts, able   to abstract reason, no tangential thoughts, no hallucinations   or delusions  Her affect is normal  RESP: No cough, no audible wheezing, able to talk in full sentences  Remainder of exam unable to be completed due to telephone visits    Diagnostic Test Results:  Labs reviewed in Epic        Assessment/Plan:  1. Acute cystitis with hematuria  Still with some right flank pain and cloudy urine   Will check a UA   - *UA reflex to Microscopic and Culture ([place] and [facility] (except [place] and  [place]); Future    2. Dyspnea on exertion  3. Pulmonary nodules  4. Smoker  Complaints of worsening SHORTNESS OF AIR over the past year, known pulmonary nodules   Will check a CT scan of her chest   Discussed with her the importance of smoking cessation as this is likely a major contributing factor to her worsening breathing   - CT Chest w/o Contrast; Future    5. Chronic bronchitis, unspecified chronic bronchitis type (H)  Will continue advair   Will reorder her albuterol inhaler   - albuterol (PROAIR HFA/PROVENTIL HFA/VENTOLIN HFA) 108 (90 Base) MCG/ACT inhaler; Inhale 2 puffs into the lungs every 4 hours as needed for shortness of breath / dyspnea or wheezing  Dispense: 1 Inhaler; Refill: 0    Return in about 1 week (around 5/19/2020) for after CT scan .      Phone call duration:  9 minutes    Ana Paula Mccray NP

## 2020-05-12 NOTE — PATIENT INSTRUCTIONS
Will check CT scan for follow up on pulmonary nodules, right flank pain and shortness of breath   Repeat urine- can be done same day as CT scan of chest in [place]- ask to have them help you schedule       Recommend that you quit smoking!!!!      Call and schedule follow up telephone visit after CT scan

## 2020-05-12 NOTE — NURSING NOTE
"Chief Complaint   Patient presents with     Results       Initial There were no vitals taken for this visit. Estimated body mass index is 28.67 kg/m  as calculated from the following:    Height as of 5/5/20: 1.556 m (5' 1.25\").    Weight as of 5/5/20: 69.4 kg (153 lb).    Patient presents to the clinic using No DME    Health Maintenance that is potentially due pending provider review:  NONE        Is there anyone who you would like to be able to receive your results? No  If yes have patient fill out JASIEL      "

## 2020-05-13 ENCOUNTER — HOSPITAL ENCOUNTER (OUTPATIENT)
Dept: CT IMAGING | Facility: CLINIC | Age: 60
Discharge: HOME | End: 2020-05-13
Attending: NURSE PRACTITIONER | Admitting: NURSE PRACTITIONER
Payer: MEDICARE

## 2020-05-13 DIAGNOSIS — F17.200 SMOKER: ICD-10-CM

## 2020-05-13 DIAGNOSIS — R91.8 PULMONARY NODULES: ICD-10-CM

## 2020-05-13 DIAGNOSIS — R06.09 DYSPNEA ON EXERTION: ICD-10-CM

## 2020-05-13 DIAGNOSIS — N30.01 ACUTE CYSTITIS WITH HEMATURIA: ICD-10-CM

## 2020-05-13 PROCEDURE — 71250 CT THORAX DX C-: CPT

## 2020-05-13 PROCEDURE — 81003 URINALYSIS AUTO W/O SCOPE: CPT | Performed by: NURSE PRACTITIONER

## 2020-06-03 ENCOUNTER — TRANSFERRED RECORDS (OUTPATIENT)
Dept: HEALTH INFORMATION MANAGEMENT | Facility: CLINIC | Age: 60
End: 2020-06-03

## 2020-06-23 ENCOUNTER — PATIENT OUTREACH (OUTPATIENT)
Dept: CARE COORDINATION | Facility: CLINIC | Age: 60
End: 2020-06-23

## 2020-06-23 NOTE — LETTER
[facility] CARE COORDINATION  [facility]  [address] [place] [address]      June 23, 2020    Vesta Rodriguez  [address] [place], [place] [address] [facility] [place] [address]      Dear Vesta,    I am a clinic care coordinator who works with Ana Paula Mccray NP at [facility]. I recently tried to call and was unable to reach you. Below is a description of clinic care coordination and how I can further assist you.      The clinic care coordination team is made up of a registered nurse,  and community health worker who understand the health care system. The goal of clinic care coordination is to help you manage your health and improve access to the health care system in the most efficient manner. The team can assist you in meeting your health care goals by providing education, coordinating services, strengthening the communication among your providers and supporting you with any resource needs.    Please feel free to contact me at [phone] with any questions or concerns. We are focused on providing you with the highest-quality healthcare experience possible and that all starts with you.     Sincerely,     Roosevelt Mike RN  Clinic Care Coordinator

## 2020-06-23 NOTE — PROGRESS NOTES
Clinic Care Coordination Contact  [facility]/Voicemail    Referral Source: IP Report  Clinical Data: Care Coordinator Outreach  Outreach attempted x 1.  Left message on patient's voicemail with call back information and requested return call.  Plan: Care Coordinator will send care coordination introduction letter with care coordinator contact information and explanation of care coordination services via mail. Care Coordinator will try to reach patient again in 1-2 business days.  Roosevelt Mike RN  Primary Care Clinic RN Care Coordinator  [facility]   [phone]

## 2020-07-02 ENCOUNTER — PATIENT OUTREACH (OUTPATIENT)
Dept: CARE COORDINATION | Facility: CLINIC | Age: 60
End: 2020-07-02

## 2020-07-02 NOTE — PROGRESS NOTES
Clinic Care Coordination Contact  [facility]/Voicemail       Clinical Data: Care Coordinator Outreach  Outreach attempted x 2.  Left message on patient's voicemail with call back information and requested return call.  Plan: Care Coordinator sent care coordination introduction letter on 6-23 via mail. Care Coordinator will do no further outreaches at this time.  Roosevelt Mike RN  Primary Care Clinic RN Care Coordinator  [facility]   [phone]

## 2020-07-20 ENCOUNTER — TELEPHONE (OUTPATIENT)
Dept: FAMILY MEDICINE | Facility: CLINIC | Age: 60
End: 2020-07-20

## 2020-07-20 DIAGNOSIS — G25.81 RESTLESS LEG SYNDROME: ICD-10-CM

## 2020-07-20 RX ORDER — PRAMIPEXOLE DIHYDROCHLORIDE 1.5 MG/1
TABLET ORAL
Qty: 90 TABLET | Refills: 3 | Status: SHIPPED | OUTPATIENT
Start: 2020-07-20 | End: 2024-01-23

## 2020-07-20 NOTE — TELEPHONE ENCOUNTER
Pt called to ask if we could possibly put a rush on her request for refill as she has 2 pills left. The pharmacy told her it could take 2 days so she should call .

## 2020-08-10 ENCOUNTER — OFFICE VISIT (OUTPATIENT)
Dept: FAMILY MEDICINE | Facility: CLINIC | Age: 60
End: 2020-08-10
Payer: MEDICARE

## 2020-08-10 ENCOUNTER — TELEPHONE (OUTPATIENT)
Dept: FAMILY MEDICINE | Facility: CLINIC | Age: 60
End: 2020-08-10

## 2020-08-10 VITALS
HEART RATE: 81 BPM | DIASTOLIC BLOOD PRESSURE: 80 MMHG | TEMPERATURE: 99.1 F | SYSTOLIC BLOOD PRESSURE: 110 MMHG | RESPIRATION RATE: 16 BRPM | OXYGEN SATURATION: 97 % | WEIGHT: 138.8 LBS | BODY MASS INDEX: 26.21 KG/M2 | HEIGHT: 61 IN

## 2020-08-10 DIAGNOSIS — Z98.1 S/P LUMBAR FUSION: ICD-10-CM

## 2020-08-10 DIAGNOSIS — M54.50 CHRONIC BILATERAL LOW BACK PAIN, UNSPECIFIED WHETHER SCIATICA PRESENT: ICD-10-CM

## 2020-08-10 DIAGNOSIS — I82.512 CHRONIC DEEP VEIN THROMBOSIS (DVT) OF FEMORAL VEIN OF LEFT LOWER EXTREMITY (H): Primary | ICD-10-CM

## 2020-08-10 DIAGNOSIS — G89.29 CHRONIC BILATERAL LOW BACK PAIN, UNSPECIFIED WHETHER SCIATICA PRESENT: ICD-10-CM

## 2020-08-10 PROCEDURE — 99213 OFFICE O/P EST LOW 20 MIN: CPT | Performed by: NURSE PRACTITIONER

## 2020-08-10 ASSESSMENT — MIFFLIN-ST. JEOR: SCORE: 1146.97

## 2020-08-10 NOTE — PROGRESS NOTES
"Subjective     Vesta KAYCEE Rodriguez is a 59 year old female who presents to clinic today for the following health issues:    HPI       ED/UC Followup:    Facility:  [facility]  Date of visit: 8/8/2020  Reason for visit: DVT left upper leg  Current Status: swelling is much improved, still has pain      US results 8/8/2020  IMPRESSION:  Nonocclusive thrombus within the left common femoral vein. Occlusive thrombus within the proximal left greater saphenous vein.    S/P POSTERIOR OSTEOTOMY L3-4, POSTERIOR FUSION WITH DECOMPRESSION L3-4, REVISION INSTRUMENTION T12-S1, RIGHT ILIAC CREST BONE GRAFT, AND IMPLANTATION OF BONE STIMULATOR 6/17/2020  Had a left iliac vein stent that was placed after surgery due to injury to vessel discharged on ASA        Reviewed and updated as needed this visit by Provider         Review of Systems   Constitutional, HEENT, cardiovascular, pulmonary, gi and gu systems are negative, except as otherwise noted.      Objective    /80   Pulse 81   Temp 99.1  F (37.3  C) (Tympanic)   Resp 16   Ht 1.549 m (5' 1\")   Wt 63 kg (138 lb 12.8 oz)   SpO2 97%   BMI 26.23 kg/m    Body mass index is 26.23 kg/m .  Physical Exam   GENERAL: healthy, alert and no distress  NECK: no adenopathy, no asymmetry, masses, or scars and thyroid normal to palpation  RESP: lungs clear to auscultation - no rales, rhonchi or wheezes  CV: regular rate and rhythm, normal S1 S2, no S3 or S4, no murmur, click or rub, no peripheral edema and peripheral pulses strong  ABDOMEN: soft, nontender, no hepatosplenomegaly, no masses and bowel sounds normal  MS: wearing TLSO brace  She has trace edema in left lower extremity   SKIN: no suspicious lesions or rashes  PSYCH: mentation appears normal, affect normal/bright    Diagnostic Test Results:  Labs reviewed in Epic        Assessment & Plan       ICD-10-CM    1. Chronic deep vein thrombosis (DVT) of femoral vein of left lower extremity (H)  I82.512    2. Chronic bilateral low " back pain, unspecified whether sciatica present  M54.5     G89.29    3. S/P lumbar fusion  Z98.1      New provoked DVT on eliquis   Symptoms improved today   No shortness of breath   Recommend follow up in 3 weeks for a recheck          Patient Instructions   Continue on the eliquis   No changes today   Follow up in 3 weeks in clinic with me       Return in about 3 weeks (around 8/31/2020).    Ana Paula Mccray NP  [facility]

## 2020-08-31 ENCOUNTER — OFFICE VISIT (OUTPATIENT)
Dept: FAMILY MEDICINE | Facility: CLINIC | Age: 60
End: 2020-08-31
Payer: MEDICARE

## 2020-08-31 VITALS
HEIGHT: 61 IN | WEIGHT: 141 LBS | TEMPERATURE: 98.8 F | SYSTOLIC BLOOD PRESSURE: 116 MMHG | HEART RATE: 88 BPM | OXYGEN SATURATION: 97 % | RESPIRATION RATE: 18 BRPM | BODY MASS INDEX: 26.62 KG/M2 | DIASTOLIC BLOOD PRESSURE: 82 MMHG

## 2020-08-31 DIAGNOSIS — I82.512 CHRONIC DEEP VEIN THROMBOSIS (DVT) OF FEMORAL VEIN OF LEFT LOWER EXTREMITY (H): Primary | ICD-10-CM

## 2020-08-31 DIAGNOSIS — Z98.1 S/P LUMBAR FUSION: ICD-10-CM

## 2020-08-31 DIAGNOSIS — Z13.29 SCREENING FOR THYROID DISORDER: ICD-10-CM

## 2020-08-31 LAB
ANION GAP SERPL CALCULATED.3IONS-SCNC: 3 MMOL/L (ref 3–14)
BASOPHILS # BLD AUTO: 0.1 10E9/L (ref 0–0.2)
BASOPHILS NFR BLD AUTO: 0.7 %
BUN SERPL-MCNC: 12 MG/DL (ref 7–30)
CALCIUM SERPL-MCNC: 8.7 MG/DL (ref 8.5–10.1)
CHLORIDE SERPL-SCNC: 110 MMOL/L (ref 94–109)
CO2 SERPL-SCNC: 27 MMOL/L (ref 20–32)
CREAT SERPL-MCNC: 0.6 MG/DL (ref 0.52–1.04)
DIFFERENTIAL METHOD BLD: ABNORMAL
EOSINOPHIL # BLD AUTO: 0.1 10E9/L (ref 0–0.7)
EOSINOPHIL NFR BLD AUTO: 1.1 %
ERYTHROCYTE [DISTWIDTH] IN BLOOD BY AUTOMATED COUNT: 15.5 % (ref 10–15)
GFR SERPL CREATININE-BSD FRML MDRD: >90 ML/MIN/{1.73_M2}
GLUCOSE SERPL-MCNC: 83 MG/DL (ref 70–99)
HCT VFR BLD AUTO: 36.1 % (ref 35–47)
HGB BLD-MCNC: 11.4 G/DL (ref 11.7–15.7)
LYMPHOCYTES # BLD AUTO: 2.1 10E9/L (ref 0.8–5.3)
LYMPHOCYTES NFR BLD AUTO: 29.4 %
MCH RBC QN AUTO: 28.9 PG (ref 26.5–33)
MCHC RBC AUTO-ENTMCNC: 31.6 G/DL (ref 31.5–36.5)
MCV RBC AUTO: 92 FL (ref 78–100)
MONOCYTES # BLD AUTO: 0.5 10E9/L (ref 0–1.3)
MONOCYTES NFR BLD AUTO: 6.9 %
NEUTROPHILS # BLD AUTO: 4.3 10E9/L (ref 1.6–8.3)
NEUTROPHILS NFR BLD AUTO: 61.9 %
PLATELET # BLD AUTO: 246 10E9/L (ref 150–450)
POTASSIUM SERPL-SCNC: 3.6 MMOL/L (ref 3.4–5.3)
RBC # BLD AUTO: 3.94 10E12/L (ref 3.8–5.2)
SODIUM SERPL-SCNC: 140 MMOL/L (ref 133–144)
TSH SERPL DL<=0.005 MIU/L-ACNC: 0.56 MU/L (ref 0.4–4)
WBC # BLD AUTO: 7 10E9/L (ref 4–11)

## 2020-08-31 PROCEDURE — 36415 COLL VENOUS BLD VENIPUNCTURE: CPT | Performed by: NURSE PRACTITIONER

## 2020-08-31 PROCEDURE — 84443 ASSAY THYROID STIM HORMONE: CPT | Mod: GZ | Performed by: NURSE PRACTITIONER

## 2020-08-31 PROCEDURE — 80048 BASIC METABOLIC PNL TOTAL CA: CPT | Performed by: NURSE PRACTITIONER

## 2020-08-31 PROCEDURE — 85025 COMPLETE CBC W/AUTO DIFF WBC: CPT | Performed by: NURSE PRACTITIONER

## 2020-08-31 PROCEDURE — 99213 OFFICE O/P EST LOW 20 MIN: CPT | Performed by: NURSE PRACTITIONER

## 2020-08-31 ASSESSMENT — ANXIETY QUESTIONNAIRES
5. BEING SO RESTLESS THAT IT IS HARD TO SIT STILL: SEVERAL DAYS
2. NOT BEING ABLE TO STOP OR CONTROL WORRYING: SEVERAL DAYS
GAD7 TOTAL SCORE: 6
GAD7 TOTAL SCORE: 6
1. FEELING NERVOUS, ANXIOUS, OR ON EDGE: SEVERAL DAYS
6. BECOMING EASILY ANNOYED OR IRRITABLE: NOT AT ALL
4. TROUBLE RELAXING: MORE THAN HALF THE DAYS
GAD7 TOTAL SCORE: 6
7. FEELING AFRAID AS IF SOMETHING AWFUL MIGHT HAPPEN: NOT AT ALL
3. WORRYING TOO MUCH ABOUT DIFFERENT THINGS: SEVERAL DAYS
7. FEELING AFRAID AS IF SOMETHING AWFUL MIGHT HAPPEN: NOT AT ALL

## 2020-08-31 ASSESSMENT — PATIENT HEALTH QUESTIONNAIRE - PHQ9
SUM OF ALL RESPONSES TO PHQ QUESTIONS 1-9: 8
SUM OF ALL RESPONSES TO PHQ QUESTIONS 1-9: 8
10. IF YOU CHECKED OFF ANY PROBLEMS, HOW DIFFICULT HAVE THESE PROBLEMS MADE IT FOR YOU TO DO YOUR WORK, TAKE CARE OF THINGS AT HOME, OR GET ALONG WITH OTHER PEOPLE: SOMEWHAT DIFFICULT

## 2020-08-31 ASSESSMENT — MIFFLIN-ST. JEOR: SCORE: 1156.95

## 2020-08-31 NOTE — NURSING NOTE
"Chief Complaint   Patient presents with     Deep Vein Thrombosis     /82 (Cuff Size: Adult Regular)   Pulse 88   Temp 98.8  F (37.1  C) (Tympanic)   Resp 18   Ht 1.549 m (5' 1\")   Wt 64 kg (141 lb)   SpO2 97%   Breastfeeding No   BMI 26.64 kg/m   Estimated body mass index is 26.64 kg/m  as calculated from the following:    Height as of this encounter: 1.549 m (5' 1\").    Weight as of this encounter: 64 kg (141 lb).  Patient presents to the clinic using No DME      Health Maintenance that is potentially due pending provider review:    Health Maintenance Due   Topic Date Due     URINE DRUG SCREEN  1960     HIV SCREENING  12/17/1976     MEDICARE ANNUAL WELLNESS VISIT  12/17/1979     ZOSTER IMMUNIZATION (1 of 2) 12/17/2011     DTAP/TDAP/TD IMMUNIZATION (2 - Td) 04/07/2019     PHQ-9  01/05/2020     INFLUENZA VACCINE (1) 09/01/2020                "

## 2020-08-31 NOTE — PATIENT INSTRUCTIONS
Lets check labs   Recommend elevating left leg 20 minutes twice a day at least   Try compression stockings

## 2020-08-31 NOTE — PROGRESS NOTES
SUBJECTIVE   Vesta A Michael is a 59 year old female who presents with     Concern - DVT follow up  Onset: 8/8/2020  Description: Left upper leg   Intensity: moderate  Progression of Symptoms:  Worsening- leg is still swollen. States the swelling is now going into her ankle   Accompanying Signs & Symptoms: pain at times   Previous history of similar problem: DVT  Precipitating factors:        Worsened by: movement  Alleviating factors:        Improved by:   Therapies tried and outcome: Eliquis     She denies any shortness of breath     Leg is still swollen   She will be getting a new brace- current one is ill fitting   Has follow scheduled on 9/22/20 but was told needs to come in soone r      TSH   Date Value Ref Range Status   11/25/2019 0.18 (L) 0.40 - 4.00 mU/L Final     Last Comprehensive Metabolic Panel:  Sodium   Date Value Ref Range Status   03/20/2020 138 133 - 144 mmol/L Final     Potassium   Date Value Ref Range Status   03/20/2020 3.8 3.4 - 5.3 mmol/L Final     Chloride   Date Value Ref Range Status   03/20/2020 107 94 - 109 mmol/L Final     Carbon Dioxide   Date Value Ref Range Status   03/20/2020 29 20 - 32 mmol/L Final     Anion Gap   Date Value Ref Range Status   03/20/2020 2 (L) 3 - 14 mmol/L Final     Glucose   Date Value Ref Range Status   03/20/2020 134 (H) 70 - 99 mg/dL Final     Comment:     Fasting specimen     Urea Nitrogen   Date Value Ref Range Status   03/20/2020 18 7 - 30 mg/dL Final     Creatinine   Date Value Ref Range Status   03/20/2020 0.56 0.52 - 1.04 mg/dL Final     GFR Estimate   Date Value Ref Range Status   03/20/2020 >90 >60 mL/min/[1.73_m2] Final     Comment:     Non  GFR Calc  Starting 12/18/2018, serum creatinine based estimated GFR (eGFR) will be   calculated using the Chronic Kidney Disease Epidemiology Collaboration   (CKD-EPI) equation.       Calcium   Date Value Ref Range Status   03/20/2020 9.0 8.5 - 10.1 mg/dL Final         PCP   Ana Paula Mccray NP  [phone]    Health Maintenance        Health Maintenance Due   Topic Date Due     URINE DRUG SCREEN  1960     HIV SCREENING  12/17/1976     MEDICARE ANNUAL WELLNESS VISIT  12/17/1979     ZOSTER IMMUNIZATION (1 of 2) 12/17/2011     DTAP/TDAP/TD IMMUNIZATION (2 - Td) 04/07/2019     PHQ-9  01/05/2020     INFLUENZA VACCINE (1) 09/01/2020       HPI        Patient Active Problem List   Diagnosis     Brachial neuritis or radiculitis     Osteoporosis     Displacement of lumbar intervertebral disc without myelopathy     Restless legs syndrome (RLS)     Disturbance in sleep behavior     Displacement of cervical intervertebral disc without myelopathy     Anxiety     CARDIOVASCULAR SCREENING; LDL GOAL LESS THAN 130     Tobacco user     Family history of breast cancer in mother     Vitamin D deficiency     Chronic cholecystitis     H/O of right ureteral stone     Thyroid nodule; on outsie dc,due for f/u 5/13     H/O vertebroplasty     COPD (chronic obstructive pulmonary disease); spirometry 3/10 ; rare use of mdi     Graves disease     S/P cholecystectomy     Major depressive disorder, recurrent episode, moderate (H)     Advance Care Planning     Colitis     Compression fracture     GERD (gastroesophageal reflux disease)     H/O: hysterectomy     Lumbar stenosis     CATALINA (obstructive sleep apnea)     Sleep apnea     S/P lumbar fusion     Spondylolisthesis of lumbar region     History of alcoholism (H)     Current Outpatient Medications   Medication     albuterol (PROAIR HFA/PROVENTIL HFA/VENTOLIN HFA) 108 (90 Base) MCG/ACT inhaler     apixaban ANTICOAGULANT (ELIQUIS ANTICOAGULANT) 5 MG tablet     calcium carbonate (CALCIUM CARBONATE) 600 MG tablet     cyclobenzaprine (FLEXERIL) 10 MG tablet     fluticasone-salmeterol (ADVAIR DISKUS) 100-50 MCG/DOSE inhaler     hydrOXYzine (ATARAX) 25 MG tablet     order for DME     pramipexole (MIRAPEX) 1.5 MG tablet     topiramate (TOPAMAX) 25 MG tablet     vitamin D2 (ERGOCALCIFEROL)  90333 units (1250 mcg) capsule     No current facility-administered medications for this visit.        Patient Active Problem List   Diagnosis     Brachial neuritis or radiculitis     Osteoporosis     Displacement of lumbar intervertebral disc without myelopathy     Restless legs syndrome (RLS)     Disturbance in sleep behavior     Displacement of cervical intervertebral disc without myelopathy     Anxiety     CARDIOVASCULAR SCREENING; LDL GOAL LESS THAN 130     Tobacco user     Family history of breast cancer in mother     Vitamin D deficiency     Chronic cholecystitis     H/O of right ureteral stone     Thyroid nodule; on outsie dc,due for f/u 5/13     H/O vertebroplasty     COPD (chronic obstructive pulmonary disease); spirometry 3/10 ; rare use of mdi     Graves disease     S/P cholecystectomy     Major depressive disorder, recurrent episode, moderate (H)     Advance Care Planning     Colitis     Compression fracture     GERD (gastroesophageal reflux disease)     H/O: hysterectomy     Lumbar stenosis     CATALINA (obstructive sleep apnea)     Sleep apnea     S/P lumbar fusion     Spondylolisthesis of lumbar region     History of alcoholism (H)     Past Surgical History:   Procedure Laterality Date     FUSION CERVICAL ANTERIOR TWO LEVELS      C 5-7     FUSION LUMBAR ANTERIOR THREE+ LEVELS      L3- SI     HYSTERECTOMY, DEYSI  1993    no oophorectomy     INJECT EPIDURAL TRANSFORAMINAL Bilateral 3/9/2017    Procedure: INJECT EPIDURAL TRANSFORAMINAL;  Surgeon: Pérez Valle MD;  Location: [facility]     INJECT EPIDURAL TRANSFORAMINAL N/A 5/24/2018    Procedure: INJECT EPIDURAL TRANSFORAMINAL;  transforaminal epidural steroid injection, thoracic 12 - lumbar 1;  Surgeon: Pérez Valle MD;  Location: [facility]     LAPAROSCOPIC CHOLECYSTECTOMY  9/12/2012    Procedure: LAPAROSCOPIC CHOLECYSTECTOMY;  Laparoscopic vs Open Cholecystectomy;  Surgeon: Esperanza Quinn MD;  Location: [facility]     LASER HOLMIUM LITHOTRIPSY  "URETER(S), INSERT STENT, COMBINED  2012    Procedure: COMBINED CYSTOSCOPY, URETEROSCOPY, LASER HOLMIUM LITHOTRIPSY URETER(S), INSERT STENT;  Cystoscopy, Right Ureteroscopy Holmium Laser Lithotripsy , right ureteral stent placement *Latex Safe*;  Surgeon: Joshua Rm MD;  Location: [facility]     SURGICAL HISTORY OF -        section     SURGICAL HISTORY OF -       Tubal ligation     SURGICAL HISTORY OF -       Post hip fracture     SURGICAL HISTORY OF -       hardware removal, right iliac crest      VERTEBROPLASTY         Social History     Tobacco Use     Smoking status: Former Smoker     Packs/day: 0.50     Types: Cigarettes     Last attempt to quit: 2018     Years since quittin.9     Smokeless tobacco: Never Used   Substance Use Topics     Alcohol use: No     Alcohol/week: 0.0 standard drinks     Family History   Problem Relation Age of Onset     Heart Disease Brother      Breast Cancer Mother          age 31     Alcohol/Drug Other            Reviewed and updated:  Tobacco  Allergies  Meds  Med Hx  Surg Hx  Fam Hx  Soc Hx     ROS:  Constitutional, HEENT, cardiovascular, pulmonary, gi and gu systems are negative, except as otherwise noted.    PHYSICAL EXAM   /82 (Cuff Size: Adult Regular)   Pulse 88   Temp 98.8  F (37.1  C) (Tympanic)   Resp 18   Ht 1.549 m (5' 1\")   Wt 64 kg (141 lb)   SpO2 97%   Breastfeeding No   BMI 26.64 kg/m    Body mass index is 26.64 kg/m .  GENERAL: healthy, alert and no distress  NECK: no adenopathy, no asymmetry, masses, or scars and thyroid normal to palpation  RESP: lungs clear to auscultation - no rales, rhonchi or wheezes  CV: regular rate and rhythm, normal S1 S2, no S3 or S4, no murmur, click or rub, no peripheral edema and peripheral pulses strong  ABDOMEN: soft, nontender, no hepatosplenomegaly, no masses and bowel sounds normal  MS: LLE exam shows non pitting edema     Assessment & Plan     Chronic deep vein " thrombosis (DVT) of femoral vein of left lower extremity (H)  She has been on eliquis for almost 1 month   Denies pain in left leg   Still has significant swelling- will try elevated and compression stocking   Will follow up in 6 weeks   - CBC with platelets and differential  - Basic metabolic panel    Screening for thyroid disorder  - TSH with free T4 reflex    S/P lumbar fusion  Managed by spine specialist   Doing well     - CBC with platelets and differential  - Basic metabolic panel         Patient Instructions   Lets check labs   Recommend elevating left leg 20 minutes twice a day at least   Try compression stockings           Return in about 6 weeks (around 10/12/2020).    Ana Paula Mccray NP  [facility]      Risks, benefits, side effects and rationale for treatment plan fully discussed with the patient and understanding expressed.

## 2020-09-01 ASSESSMENT — ANXIETY QUESTIONNAIRES: GAD7 TOTAL SCORE: 6

## 2020-09-10 ENCOUNTER — TELEPHONE (OUTPATIENT)
Dept: FAMILY MEDICINE | Facility: CLINIC | Age: 60
End: 2020-09-10

## 2020-09-10 DIAGNOSIS — R60.0 PERIPHERAL EDEMA: Primary | ICD-10-CM

## 2020-09-10 NOTE — TELEPHONE ENCOUNTER
Reason for Call: Request for an order or referral:    Order or referral being requested: VO for Lymphedema Consult to fit for Compression Garment  Pt had a DVT- recommended at Visit.    Date needed: as soon as possible    Has the patient been seen by the PCP for this problem? YES      Phone number Patient can be reached at:  Other phone number:  Judy CROCKER  [phone] *    Best Time:  Any Time      Can we leave a detailed message on this number?  YES    Call taken on 9/10/2020 at 10:04 AM by Loretta Mccrary

## 2020-09-10 NOTE — TELEPHONE ENCOUNTER
Spoke with pt, states needs new L/E compression stockings as old ones fit poorly.  LM for [facility], to call clinic nurse.  SHAREE Thoams RN

## 2020-09-10 NOTE — TELEPHONE ENCOUNTER
Can you please call patient? She told me she had SAVANNAH stockings at home? Does not need lymphedema referral.  Marian

## 2020-09-11 NOTE — TELEPHONE ENCOUNTER
Judy Boothe from [facility] called back, she says she thinks the patient would benefit from a lymphedema consult to determine pressure, etc for possible thigh high compression stockings. Judy says that the patient has surgical sakina hose at this time. Have the referral pended, needs to be faxed to [facility] at [phone],  please advise.    LISSETT Martinez

## 2020-09-17 DIAGNOSIS — I82.512 CHRONIC DEEP VEIN THROMBOSIS (DVT) OF FEMORAL VEIN OF LEFT LOWER EXTREMITY (H): Primary | ICD-10-CM

## 2020-09-17 RX ORDER — APIXABAN 5 MG/1
TABLET, FILM COATED ORAL
Qty: 74 TABLET | Refills: 0 | Status: SHIPPED | OUTPATIENT
Start: 2020-09-17 | End: 2024-01-23

## 2020-09-22 ENCOUNTER — TRANSFERRED RECORDS (OUTPATIENT)
Dept: HEALTH INFORMATION MANAGEMENT | Facility: CLINIC | Age: 60
End: 2020-09-22

## 2020-11-14 ENCOUNTER — HEALTH MAINTENANCE LETTER (OUTPATIENT)
Age: 60
End: 2020-11-14

## 2021-02-17 ENCOUNTER — TRANSFERRED RECORDS (OUTPATIENT)
Dept: HEALTH INFORMATION MANAGEMENT | Facility: CLINIC | Age: 61
End: 2021-02-17

## 2021-05-11 ENCOUNTER — TRANSFERRED RECORDS (OUTPATIENT)
Dept: HEALTH INFORMATION MANAGEMENT | Facility: CLINIC | Age: 61
End: 2021-05-11

## 2021-05-28 ENCOUNTER — RECORDS - HEALTHEAST (OUTPATIENT)
Dept: ADMINISTRATIVE | Facility: CLINIC | Age: 61
End: 2021-05-28

## 2021-05-31 VITALS
BODY MASS INDEX: 34.17 KG/M2 | HEIGHT: 62 IN | WEIGHT: 185.7 LBS | HEIGHT: 62 IN | WEIGHT: 185.7 LBS | BODY MASS INDEX: 34.17 KG/M2

## 2021-06-01 ENCOUNTER — RECORDS - HEALTHEAST (OUTPATIENT)
Dept: ADMINISTRATIVE | Facility: CLINIC | Age: 61
End: 2021-06-01

## 2021-06-02 NOTE — PROGRESS NOTES
"ASSESSMENT AND PLAN:  Vesta Rodriguez 58 y.o. female is seen here on 10/09/19 for evaluation of positive GELY in the background of leg \"cramping\".  She has a history of Graves' disease.  There is little to suggest that she has connective tissue disease such as systemic lupus erythematosus.  This is discussed with her.  Further work-up as noted.  The likelihood that her leg cramping is associated with a positive GELY appears to be remote.  She does have bilateral lower extremity edema.  She is going to check with her primary physician.  Once these data are available we will get together one more time in the next few weeks.  Diagnoses and all orders for this visit:    GELY positive  -     HM1(CBC and Differential)  -     Creatinine  -     ALT (SGPT)  -     Albumin  -     Rheumatoid Factor Quant  -     CCP Antibodies  -     Complement, C'3  -     Complement, C'4  -     Erythrocyte Sedimentation Rate  -     C-Reactive Protein  -     Antinuclear Antibody (GELY) Cascade  -     CK Total  -     HM1 (CBC with Diff)    Myalgia  -     CK Total      HISTORY OF PRESENTING ILLNESS:  Vesta Rodriguez, 58 y.o., female is here for evaluation of positive GELY, which was performed recently in neurology for evaluation of \"cramping\" of the lower extremities.  She had titer at 1: 320.  She reports no photosensitivity, there is no history of mouth ulcers, pleuritic type chest pain.  She has not had a seizure disorder.  She describes nose painful, swollen joints.  She noted that she used to be a \"big walker\".  Since her back surgery as she went to physical therapy and rehab and started walking and that is when he then she started getting the cramping in the calf areas.  She reports the surgeries on the spine were done on multiple occasions she noted fusion from T12-S1.  She has a history of Graves' disease.  At one time she was on Topamax for a couple of months.  She recalls her \"levels\" than 10 normal.  As she gets the thyroid function tested " every 6 months but is currently in not on any treatment.  She has not had a radioactive iodine either.  She reports no known family history of lupus rheumatoid arthritis ankylosing spondylitis Sjogren's syndrome but then she is also not all that close to her family so her information is only partial.  She is currently disabled due to the back problems.  She did quit smoking for a year and started again, she plans to quit again.  She reports no features suggestive of iritis, photosensitivity, PE DVT, seizures kidney problems Tremayne's.  She has had no miscarriages.  She has been told that she has beginning of COPD.    Further historical information and ADL limitations as noted in the multidimensional health assessment questionnaire attached in the EMR. Rest of the 13 system ROS is negative.     ALLERGIES:Ambien [zolpidem]; Celexa [citalopram]; Doxycycline; Gabapentin; Zoloft [sertraline]; and Adhesive tape-silicones    PAST MEDICAL/ACTIVE PROBLEMS/MEDICATION/ FAMILY HISTORY/SOCIAL DATA:  The patient has a family history of  Past Medical History:   Diagnosis Date     Anxiety      Chronic cholecystitis      COPD (chronic obstructive pulmonary disease) (H)      Depression      Graves disease      History of transfusion 2012     Osteoporosis      RLS (restless legs syndrome)      Sleep apnea     does not use CPAP due to insomnia     Social History     Tobacco Use   Smoking Status Current Every Day Smoker     Packs/day: 1.00   Smokeless Tobacco Never Used     Patient Active Problem List   Diagnosis     Lumbar stenosis     Hypotension     Sleep apnea     Graves disease     Bradycardia     Hypoxia     Acute blood loss anemia     Current Outpatient Medications   Medication Sig Dispense Refill     calcium carbonate (OS-HELEN) 600 mg calcium (1,500 mg) tablet Take 600 mg by mouth 2 (two) times a day with meals.       ergocalciferol (VITAMIN D2) 50,000 unit capsule Take 50,000 Units by mouth once a week. wednesdays        "pramipexole (MIRAPEX) 0.5 MG tablet Take 1.5 mg by mouth 2 (two) times a day.        fluticasone-salmeterol (ADVAIR) 100-50 mcg/dose DISKUS Inhale 1 puff 2 (two) times a day as needed.        hydrOXYzine (VISTARIL) 25 MG capsule Take 1-2 capsules (25-50 mg total) by mouth every 6 (six) hours as needed for other (post op pain). 90 capsule 2     ipratropium-albuterol (COMBIVENT RESPIMAT)  mcg/actuation Mist inhaler Inhale 1 puff 4 (four) times a day as needed (dyspnea, wheezing).  0     magnesium oxide (MAGOX) 400 mg tablet Take 1 tablet (400 mg total) by mouth 2 (two) times a day.  0     methIMAzole (TAPAZOLE) 5 MG tablet Take 5 mg by mouth daily.        senna-docusate (PERICOLACE) 8.6-50 mg tablet Take 1 tablet by mouth 2 (two) times a day.  0     teriparatide (FORTEO) 20 mcg/dose - 600 mcg/2.4 mL injection Inject 20 mcg under the skin daily.       No current facility-administered medications for this visit.        COMPREHENSIVE EXAMINATION:  Vitals:    10/09/19 1114   BP: 100/64   Pulse: 80   Weight: 169 lb 8 oz (76.9 kg)   Height: 5' 2\" (1.575 m)     A well appearing alert oriented female. Vital data as noted above. Her eyes without inflammation/scleromalacia. ENTwithout oral mucositis, thrush, nasal deformity, external ear redness, deformity. Her neck is without lymphadenopathy and supple. Lungs normal sounds, no pleural rub. Heart auscultation normal rate, rhythm; no pericardial rub and murmurs. Abdomen soft, non tender, no organomegaly. Skin examined for heliotrope, malar area eruption, lupus pernio, periungual erythema, sclerodactyly, papules, erythema nodosum, purpura, nail pitting, onycholysis, and obvious psoriasis lesion. Neurological examination shows normal alertness, speech, facial symmetry, tone and power in upper and lower extremities. The joint examination is performed for swelling, tenderness, warmth, erythema, and range of motion in the following joints: DIPs, PIPs, MCPs, wrists, first CMC's, " elbows, shoulders, hips, knees, ankles, feet; spine for range of motion and paraspinal muscles for tenderness. The salient  findings are: There is no Maller area eruption no mucositis, she does not have sclerodactyly.  There is no pleuropericardial rub.  There is no synovitis of palpable joints of upper and lower extremities.  There is no dactylitis of digits.  Several tattoos.  She has bilateral ankle edema worse on the left side.    LAB / IMAGING DATA:  Creatinine   Date Value Ref Range Status   06/03/2017 0.71 0.60 - 1.10 mg/dL Final   06/02/2017 0.64 0.60 - 1.10 mg/dL Final   06/01/2017 0.69 0.60 - 1.10 mg/dL Final       WBC   Date Value Ref Range Status   06/03/2017 9.7 4.0 - 11.0 thou/uL Final   06/02/2017 10.8 4.0 - 11.0 thou/uL Final     Hemoglobin   Date Value Ref Range Status   06/03/2017 9.4 (L) 12.0 - 16.0 g/dL Final   06/02/2017 9.5 (L) 12.0 - 16.0 g/dL Final   06/01/2017 8.7 (L) 12.0 - 16.0 g/dL Final     Platelets   Date Value Ref Range Status   06/03/2017 178 140 - 440 thou/uL Final   06/02/2017 182 140 - 440 thou/uL Final   06/01/2017 173 140 - 440 thou/uL Final       No results found for: GELY, RF, SEDRATE

## 2021-06-03 VITALS
DIASTOLIC BLOOD PRESSURE: 64 MMHG | SYSTOLIC BLOOD PRESSURE: 100 MMHG | HEART RATE: 80 BPM | HEIGHT: 62 IN | WEIGHT: 169.5 LBS | BODY MASS INDEX: 31.19 KG/M2

## 2021-06-08 ENCOUNTER — TRANSFERRED RECORDS (OUTPATIENT)
Dept: HEALTH INFORMATION MANAGEMENT | Facility: CLINIC | Age: 61
End: 2021-06-08

## 2021-06-10 NOTE — ANESTHESIA PREPROCEDURE EVALUATION
Anesthesia Evaluation        Airway   Mallampati: II  Neck ROM: limited   Pulmonary    (+) COPD moderate, sleep apnea on no CPAP, , decreased breath sounds,   (-) recent URI                         Cardiovascular - normal exam  Exercise tolerance: < 4 METS  (-) angina  ECG reviewed  Rhythm: regular  Rate: normal,         Neuro/Psych    (+) depression,   (-) no CVA    Endo/Other    (+) hypothyroidism, hyperthyroidism, obesity,      Comments: Graves dz    GI/Hepatic/Renal       Other findings: Postop hypotension, BP 80/48 in preop today. hgb 11.5.  Starting second IV, giving albumin, stress dose steroids.      Dental    (+) poor dentition and chipped                       Anesthesia Plan  Planned anesthetic: general endotracheal  Albuterol/hydrocortisone/2nd IV in preop.    Arterial line prn (have setup ready in OR)  ASA 4   Induction: intravenous   Anesthetic plan and risks discussed with: patient  Anesthesia plan special considerations: antiemetics,   Post-op plan: routine recovery

## 2021-06-10 NOTE — ANESTHESIA POSTPROCEDURE EVALUATION
Patient: Vesta Rodriguez  STAGE 1 - ANTERIOR LATERAL FUSION L2-3 BILATERAL  Anesthesia type: general    Patient location: PACU  Last vitals:   Vitals:    05/30/17 1545   BP: 113/71   Pulse: 79   Resp: 15   Temp:    SpO2: 99%     Post vital signs: stable  Level of consciousness: awake and responds to simple questions  Post-anesthesia pain: pain controlled  Post-anesthesia nausea and vomiting: no  Pulmonary: unassisted, return to baseline  Cardiovascular: stable and blood pressure at baseline  Hydration: adequate  Anesthetic events: no    QCDR Measures:  ASA# 11 - Brionna-op Cardiac Arrest: ASA11B - Patient did NOT experience unanticipated cardiac arrest  ASA# 12 - Brionna-op Mortality Rate: ASA12B - Patient did NOT die  ASA# 13 - PACU Re-Intubation Rate: ASA13B - Patient did NOT require a new airway mgmt  ASA# 10 - Composite Anes Safety: ASA10A - No serious adverse event  ASA# 38 - New Corneal Injury: ASA38A - No new exposure keratitis or corneal abrasion in PACU       Additional Notes:

## 2021-06-10 NOTE — ANESTHESIA CARE TRANSFER NOTE
Last vitals:   Vitals:    05/30/17 1450   BP: 142/82   Pulse: 91   Resp: 15   Temp:    SpO2: 100%     Patient's level of consciousness is drowsy  Spontaneous respirations: yes  Maintains airway independently: yes  Dentition unchanged: yes  Oropharynx: oropharynx clear of all foreign objects    QCDR Measures:  ASA# 20 - Surgical Safety Checklist: ASA20A - Safety Checks Done  PQRS# 430 - Adult PONV Prevention: 4558F - Pt received => 2 anti-emetic agents (different classes) preop & intraop  ASA# 8 - Peds PONV Prevention: NA - Not pediatric patient, not GA or 2 or more risk factors NOT present  PQRS# 424 - Brionna-op Temp Management: 4559F - At least one body temp DOCUMENTED => 35.5C or 95.9F within required timeframe  PQRS# 426 - PACU Transfer Protocol: - Transfer of care checklist used  ASA# 14 - Acute Post-op Pain: ASA14B - Patient did NOT experience pain >= 7 out of 10

## 2021-06-10 NOTE — ANESTHESIA PREPROCEDURE EVALUATION
Anesthesia Evaluation        Airway   Mallampati: II  Neck ROM: full   Pulmonary - normal exam   (+) COPD moderate, sleep apnea on CPAP, , a smoker                         Cardiovascular - normal exam   Neuro/Psych      Endo/Other    (+) hyperthyroidism,      GI/Hepatic/Renal            Dental    (+) poor dentition                       Anesthesia Plan  Planned anesthetic: general endotracheal    ASA 3   Induction: intravenous   Anesthetic plan and risks discussed with: patient    Post-op plan: routine recovery

## 2021-06-11 NOTE — ANESTHESIA CARE TRANSFER NOTE
Last vitals:   Vitals:    05/31/17 1340   BP: 117/67   Pulse: 80   Resp: 21   Temp: 35.9  C (96.6  F)   SpO2: 100%     Patient's level of consciousness is drowsy  Spontaneous respirations: yes  Maintains airway independently: yes  Dentition unchanged: yes  Oropharynx: oropharynx clear of all foreign objects    QCDR Measures:  ASA# 20 - Surgical Safety Checklist: ASA20A - Safety Checks Done  PQRS# 430 - Adult PONV Prevention: 4558F - Pt received => 2 anti-emetic agents (different classes) preop & intraop  ASA# 8 - Peds PONV Prevention: NA - Not pediatric patient, not GA or 2 or more risk factors NOT present  PQRS# 424 - Brionna-op Temp Management: 4559F - At least one body temp DOCUMENTED => 35.5C or 95.9F within required timeframe  PQRS# 426 - PACU Transfer Protocol: - Transfer of care checklist used  ASA# 14 - Acute Post-op Pain: ASA14B - Patient did NOT experience pain >= 7 out of 10

## 2021-06-11 NOTE — ANESTHESIA POSTPROCEDURE EVALUATION
Patient: Vesta Rodriguez  STAGE 2 - POSTERIOR FUSION L2-3 BILATERAL, DECOMPRESSION L1-3 BILATERAL, HARDWARE REMOVAL L3-4 BILATERAL  Anesthesia type: general    Patient location: PACU  Last vitals:   Vitals:    05/31/17 1515   BP:    Pulse: 77   Resp: 14   Temp:    SpO2: 98%     Post vital signs: stable  Level of consciousness: awake and responds to simple questions  Post-anesthesia pain: pain controlled  Post-anesthesia nausea and vomiting: no  Pulmonary: unassisted, return to baseline  Cardiovascular: stable and blood pressure at baseline  Hydration: adequate  Anesthetic events: no    QCDR Measures:  ASA# 11 - Brionna-op Cardiac Arrest: ASA11B - Patient did NOT experience unanticipated cardiac arrest  ASA# 12 - Brionna-op Mortality Rate: ASA12B - Patient did NOT die  ASA# 13 - PACU Re-Intubation Rate: ASA13B - Patient did NOT require a new airway mgmt  ASA# 10 - Composite Anes Safety: ASA10A - No serious adverse event  ASA# 38 - New Corneal Injury: ASA38A - No new exposure keratitis or corneal abrasion in PACU    Additional Notes: /56, HR 78, gave atropine in PACU for HR high 40's and BP 85/50 with good response.  Patient was hypotensive preop starting at 0400 this morning.  Change of status to ICU for closer monitoring of intermittant hypotension.  Spoke to Dr. Law

## 2021-06-11 NOTE — ANESTHESIA PROCEDURE NOTES
Arterial Line  Reason for Procedure: hemodynamic monitoring  Patient location during procedure: OR pre-induction  Start time: 5/31/2017 11:07 AM  End time: 5/31/2017 11:11 AM  Staffing:  Performing  Anesthesiologist: ASIA BACON  Performing CRNA: JOSE A LAZO  Sterile Precautions:  sterile barriers used during insertion: cap, mask, sterile gloves, large sheet, and hand hygiene used.  Arterial Line:   Immediately prior to procedure a time out was called to verify the correct patient, procedure, equipment, support staff and site/side marked as required  Laterality: left  Location: brachial  Prepped with: ChloroPrep    Needle gauge: 20 G  Number of Attempts: 1  Secured with: tape, transparent dressing and pressure dressing (suture)  Flushed with: saline  1% lidocaine local anesthesia used for skin prep.   See MAR for additional medications given.  Ultrasound evaluation of access site: yes  Vessel patent by US exam    Concurrent real time visualization of needle entry

## 2021-09-12 ENCOUNTER — HEALTH MAINTENANCE LETTER (OUTPATIENT)
Age: 61
End: 2021-09-12

## 2022-01-02 ENCOUNTER — HEALTH MAINTENANCE LETTER (OUTPATIENT)
Age: 62
End: 2022-01-02

## 2022-08-31 ENCOUNTER — MEDICAL CORRESPONDENCE (OUTPATIENT)
Dept: HEALTH INFORMATION MANAGEMENT | Facility: CLINIC | Age: 62
End: 2022-08-31

## 2022-09-06 ENCOUNTER — TRANSCRIBE ORDERS (OUTPATIENT)
Dept: OTHER | Age: 62
End: 2022-09-06

## 2022-09-06 DIAGNOSIS — M54.2 CERVICAL PAIN: Primary | ICD-10-CM

## 2022-09-06 DIAGNOSIS — M89.50 OSTEOLYSIS: ICD-10-CM

## 2022-11-19 ENCOUNTER — HEALTH MAINTENANCE LETTER (OUTPATIENT)
Age: 62
End: 2022-11-19

## 2022-12-18 ENCOUNTER — HEALTH MAINTENANCE LETTER (OUTPATIENT)
Age: 62
End: 2022-12-18

## 2023-01-03 ENCOUNTER — LAB REQUISITION (OUTPATIENT)
Dept: LAB | Facility: CLINIC | Age: 63
End: 2023-01-03
Payer: MEDICARE

## 2023-01-03 DIAGNOSIS — R76.12 NONSPECIFIC REACTION TO CELL MEDIATED IMMUNITY MEASUREMENT OF GAMMA INTERFERON ANTIGEN RESPONSE WITHOUT ACTIVE TUBERCULOSIS: ICD-10-CM

## 2023-01-04 ENCOUNTER — LAB REQUISITION (OUTPATIENT)
Dept: LAB | Facility: CLINIC | Age: 63
End: 2023-01-04
Payer: MEDICARE

## 2023-01-04 DIAGNOSIS — D64.9 ANEMIA, UNSPECIFIED: ICD-10-CM

## 2023-01-04 DIAGNOSIS — T81.49XD INFECTION FOLLOWING A PROCEDURE, OTHER SURGICAL SITE, SUBSEQUENT ENCOUNTER: ICD-10-CM

## 2023-01-04 LAB
ANION GAP SERPL CALCULATED.3IONS-SCNC: 15 MMOL/L (ref 7–15)
BASOPHILS # BLD AUTO: 0.1 10E3/UL (ref 0–0.2)
BASOPHILS NFR BLD AUTO: 2 %
BUN SERPL-MCNC: 14.7 MG/DL (ref 8–23)
CALCIUM SERPL-MCNC: 9.4 MG/DL (ref 8.8–10.2)
CHLORIDE SERPL-SCNC: 102 MMOL/L (ref 98–107)
CK SERPL-CCNC: 49 U/L (ref 26–192)
CREAT SERPL-MCNC: 0.71 MG/DL (ref 0.51–0.95)
CRP SERPL-MCNC: 8.14 MG/L
DEPRECATED HCO3 PLAS-SCNC: 22 MMOL/L (ref 22–29)
EOSINOPHIL # BLD AUTO: 0.3 10E3/UL (ref 0–0.7)
EOSINOPHIL NFR BLD AUTO: 5 %
ERYTHROCYTE [DISTWIDTH] IN BLOOD BY AUTOMATED COUNT: 15 % (ref 10–15)
GFR SERPL CREATININE-BSD FRML MDRD: >90 ML/MIN/1.73M2
GLUCOSE SERPL-MCNC: 93 MG/DL (ref 70–99)
HCT VFR BLD AUTO: 32.9 % (ref 35–47)
HGB BLD-MCNC: 10.1 G/DL (ref 11.7–15.7)
IMM GRANULOCYTES # BLD: 0 10E3/UL
IMM GRANULOCYTES NFR BLD: 0 %
LYMPHOCYTES # BLD AUTO: 1.4 10E3/UL (ref 0.8–5.3)
LYMPHOCYTES NFR BLD AUTO: 27 %
MCH RBC QN AUTO: 27.7 PG (ref 26.5–33)
MCHC RBC AUTO-ENTMCNC: 30.7 G/DL (ref 31.5–36.5)
MCV RBC AUTO: 90 FL (ref 78–100)
MONOCYTES # BLD AUTO: 0.5 10E3/UL (ref 0–1.3)
MONOCYTES NFR BLD AUTO: 9 %
NEUTROPHILS # BLD AUTO: 3 10E3/UL (ref 1.6–8.3)
NEUTROPHILS NFR BLD AUTO: 57 %
NRBC # BLD AUTO: 0 10E3/UL
NRBC BLD AUTO-RTO: 0 /100
PLATELET # BLD AUTO: 288 10E3/UL (ref 150–450)
POTASSIUM SERPL-SCNC: 4 MMOL/L (ref 3.4–5.3)
RBC # BLD AUTO: 3.65 10E6/UL (ref 3.8–5.2)
SODIUM SERPL-SCNC: 139 MMOL/L (ref 136–145)
WBC # BLD AUTO: 5.3 10E3/UL (ref 4–11)

## 2023-01-04 PROCEDURE — 86140 C-REACTIVE PROTEIN: CPT | Performed by: FAMILY MEDICINE

## 2023-01-04 PROCEDURE — 80048 BASIC METABOLIC PNL TOTAL CA: CPT | Performed by: FAMILY MEDICINE

## 2023-01-04 PROCEDURE — 82550 ASSAY OF CK (CPK): CPT | Performed by: FAMILY MEDICINE

## 2023-01-04 PROCEDURE — 85025 COMPLETE CBC W/AUTO DIFF WBC: CPT | Performed by: FAMILY MEDICINE

## 2023-01-04 PROCEDURE — P9603 ONE-WAY ALLOW PRORATED MILES: HCPCS | Performed by: FAMILY MEDICINE

## 2023-01-04 PROCEDURE — 36415 COLL VENOUS BLD VENIPUNCTURE: CPT | Performed by: FAMILY MEDICINE

## 2023-01-04 PROCEDURE — 86481 TB AG RESPONSE T-CELL SUSP: CPT | Performed by: FAMILY MEDICINE

## 2023-01-05 LAB
QUANTIFERON MITOGEN: 1.07 IU/ML
QUANTIFERON NIL TUBE: 0.02 IU/ML
QUANTIFERON TB1 TUBE: 0.03 IU/ML
QUANTIFERON TB2 TUBE: 0.04

## 2023-01-06 LAB
GAMMA INTERFERON BACKGROUND BLD IA-ACNC: 0.02 IU/ML
M TB IFN-G BLD-IMP: NEGATIVE
M TB IFN-G CD4+ BCKGRND COR BLD-ACNC: 1.05 IU/ML
MITOGEN IGNF BCKGRD COR BLD-ACNC: 0.01 IU/ML
MITOGEN IGNF BCKGRD COR BLD-ACNC: 0.02 IU/ML

## 2023-01-10 ENCOUNTER — LAB REQUISITION (OUTPATIENT)
Dept: LAB | Facility: CLINIC | Age: 63
End: 2023-01-10
Payer: MEDICARE

## 2023-01-10 DIAGNOSIS — D64.9 ANEMIA, UNSPECIFIED: ICD-10-CM

## 2023-01-10 DIAGNOSIS — T81.49XD INFECTION FOLLOWING A PROCEDURE, OTHER SURGICAL SITE, SUBSEQUENT ENCOUNTER: ICD-10-CM

## 2023-01-11 LAB
ANION GAP SERPL CALCULATED.3IONS-SCNC: 16 MMOL/L (ref 7–15)
BASOPHILS # BLD AUTO: 0.1 10E3/UL (ref 0–0.2)
BASOPHILS NFR BLD AUTO: 1 %
BUN SERPL-MCNC: 13.3 MG/DL (ref 8–23)
CALCIUM SERPL-MCNC: 9 MG/DL (ref 8.8–10.2)
CHLORIDE SERPL-SCNC: 105 MMOL/L (ref 98–107)
CK SERPL-CCNC: 52 U/L (ref 26–192)
CREAT SERPL-MCNC: 0.77 MG/DL (ref 0.51–0.95)
CRP SERPL-MCNC: <3 MG/L
DEPRECATED HCO3 PLAS-SCNC: 19 MMOL/L (ref 22–29)
EOSINOPHIL # BLD AUTO: 0.3 10E3/UL (ref 0–0.7)
EOSINOPHIL NFR BLD AUTO: 6 %
ERYTHROCYTE [DISTWIDTH] IN BLOOD BY AUTOMATED COUNT: 15.4 % (ref 10–15)
GFR SERPL CREATININE-BSD FRML MDRD: 87 ML/MIN/1.73M2
GLUCOSE SERPL-MCNC: 103 MG/DL (ref 70–99)
HCT VFR BLD AUTO: 30.5 % (ref 35–47)
HGB BLD-MCNC: 9.2 G/DL (ref 11.7–15.7)
IMM GRANULOCYTES # BLD: 0 10E3/UL
IMM GRANULOCYTES NFR BLD: 0 %
LYMPHOCYTES # BLD AUTO: 1.5 10E3/UL (ref 0.8–5.3)
LYMPHOCYTES NFR BLD AUTO: 26 %
MCH RBC QN AUTO: 27.7 PG (ref 26.5–33)
MCHC RBC AUTO-ENTMCNC: 30.2 G/DL (ref 31.5–36.5)
MCV RBC AUTO: 92 FL (ref 78–100)
MONOCYTES # BLD AUTO: 0.4 10E3/UL (ref 0–1.3)
MONOCYTES NFR BLD AUTO: 8 %
NEUTROPHILS # BLD AUTO: 3.2 10E3/UL (ref 1.6–8.3)
NEUTROPHILS NFR BLD AUTO: 59 %
NRBC # BLD AUTO: 0 10E3/UL
NRBC BLD AUTO-RTO: 0 /100
PLATELET # BLD AUTO: 270 10E3/UL (ref 150–450)
POTASSIUM SERPL-SCNC: 4.2 MMOL/L (ref 3.4–5.3)
RBC # BLD AUTO: 3.32 10E6/UL (ref 3.8–5.2)
SODIUM SERPL-SCNC: 140 MMOL/L (ref 136–145)
WBC # BLD AUTO: 5.5 10E3/UL (ref 4–11)

## 2023-01-11 PROCEDURE — P9603 ONE-WAY ALLOW PRORATED MILES: HCPCS | Performed by: FAMILY MEDICINE

## 2023-01-11 PROCEDURE — 36415 COLL VENOUS BLD VENIPUNCTURE: CPT | Performed by: FAMILY MEDICINE

## 2023-01-11 PROCEDURE — 85025 COMPLETE CBC W/AUTO DIFF WBC: CPT | Performed by: FAMILY MEDICINE

## 2023-01-11 PROCEDURE — 82550 ASSAY OF CK (CPK): CPT | Performed by: FAMILY MEDICINE

## 2023-01-11 PROCEDURE — 80048 BASIC METABOLIC PNL TOTAL CA: CPT | Performed by: FAMILY MEDICINE

## 2023-01-11 PROCEDURE — 86140 C-REACTIVE PROTEIN: CPT | Performed by: FAMILY MEDICINE

## 2023-01-25 ENCOUNTER — TELEPHONE (OUTPATIENT)
Dept: FAMILY MEDICINE | Facility: CLINIC | Age: 63
End: 2023-01-25
Payer: MEDICARE

## 2023-01-25 NOTE — TELEPHONE ENCOUNTER
General Call    Contacts       Type Contact Phone/Fax    01/25/2023 11:46 AM CST Phone (Incoming) [facility] [phone]        Reason for Call:  Confirmation on discharge of inhome infusion therapy    What are your questions or concerns:  Fabi calling looking for confirmation of last date of infusion being today, 1/25 and if they could have order faxed for discontinuation of line and access.   Writer questioned who the provider is and she stated [facility]. Writer informed that is through [facility] and not [facility].    Date of last appointment with provider:     Could we send this information to you in [employer] or would you prefer to receive a phone call?:

## 2023-04-09 ENCOUNTER — HEALTH MAINTENANCE LETTER (OUTPATIENT)
Age: 63
End: 2023-04-09

## 2024-01-23 DIAGNOSIS — G45.9 TIA (TRANSIENT ISCHEMIC ATTACK): ICD-10-CM

## 2024-01-23 DIAGNOSIS — J44.9 CHRONIC OBSTRUCTIVE PULMONARY DISEASE, UNSPECIFIED COPD TYPE (H): ICD-10-CM

## 2024-01-23 DIAGNOSIS — E78.5 HYPERLIPIDEMIA LDL GOAL <100: ICD-10-CM

## 2024-01-23 DIAGNOSIS — L30.9 DERMATITIS: ICD-10-CM

## 2024-01-23 DIAGNOSIS — I82.512 CHRONIC DEEP VEIN THROMBOSIS (DVT) OF FEMORAL VEIN OF LEFT LOWER EXTREMITY (H): ICD-10-CM

## 2024-01-23 DIAGNOSIS — M50.20 DISPLACEMENT OF CERVICAL INTERVERTEBRAL DISC WITHOUT MYELOPATHY: ICD-10-CM

## 2024-01-23 DIAGNOSIS — K59.03 DRUG-INDUCED CONSTIPATION: ICD-10-CM

## 2024-01-23 DIAGNOSIS — R06.02 SOB (SHORTNESS OF BREATH): ICD-10-CM

## 2024-01-23 DIAGNOSIS — M62.838 MUSCLE SPASM: ICD-10-CM

## 2024-01-23 DIAGNOSIS — E55.9 VITAMIN D DEFICIENCY: ICD-10-CM

## 2024-01-23 DIAGNOSIS — Z98.1 STATUS POST THORACIC SPINAL FUSION: Primary | ICD-10-CM

## 2024-01-23 DIAGNOSIS — I65.21 THROMBOSIS OF RIGHT INTERNAL CAROTID ARTERY: ICD-10-CM

## 2024-01-23 DIAGNOSIS — G25.81 RESTLESS LEG SYNDROME: ICD-10-CM

## 2024-01-23 DIAGNOSIS — E87.6 HYPOKALEMIA: ICD-10-CM

## 2024-01-23 RX ORDER — ASPIRIN 81 MG/1
81 TABLET, CHEWABLE ORAL DAILY
Start: 2024-01-23

## 2024-01-23 RX ORDER — METHOCARBAMOL 750 MG/1
TABLET, FILM COATED ORAL
Qty: 30 TABLET | Refills: 3 | Status: SHIPPED | OUTPATIENT
Start: 2024-01-23

## 2024-01-23 RX ORDER — PRAMIPEXOLE DIHYDROCHLORIDE 1.5 MG/1
1.5 TABLET ORAL 3 TIMES DAILY
Start: 2024-01-23

## 2024-01-23 RX ORDER — ATORVASTATIN CALCIUM 40 MG/1
40 TABLET, FILM COATED ORAL DAILY
Start: 2024-01-23

## 2024-01-23 RX ORDER — BETAMETHASONE DIPROPIONATE 0.05 %
OINTMENT (GRAM) TOPICAL
Start: 2024-01-23

## 2024-01-23 RX ORDER — FUROSEMIDE 40 MG
40 TABLET ORAL DAILY
Start: 2024-01-23

## 2024-01-23 RX ORDER — HYDROMORPHONE HYDROCHLORIDE 2 MG/1
2-4 TABLET ORAL EVERY 4 HOURS PRN
Qty: 30 TABLET | Refills: 0 | Status: SHIPPED | OUTPATIENT
Start: 2024-01-23 | End: 2024-01-25

## 2024-01-23 RX ORDER — POTASSIUM CHLORIDE 750 MG/1
TABLET, EXTENDED RELEASE ORAL
Start: 2024-01-23

## 2024-01-23 RX ORDER — CLOPIDOGREL BISULFATE 75 MG/1
75 TABLET ORAL DAILY
Qty: 30 TABLET | Refills: 3 | Status: SHIPPED | OUTPATIENT
Start: 2024-01-23

## 2024-01-23 RX ORDER — CHOLECALCIFEROL (VITAMIN D3) 50 MCG
1 TABLET ORAL DAILY
Start: 2024-01-23

## 2024-01-23 RX ORDER — HYDROMORPHONE HYDROCHLORIDE 2 MG/1
2-4 TABLET ORAL EVERY 4 HOURS PRN
Qty: 30 TABLET | Refills: 0 | Status: SHIPPED | OUTPATIENT
Start: 2024-01-23 | End: 2024-01-23

## 2024-01-23 RX ORDER — SENNA AND DOCUSATE SODIUM 50; 8.6 MG/1; MG/1
1 TABLET, FILM COATED ORAL 2 TIMES DAILY
Qty: 60 TABLET | Refills: 4 | Status: SHIPPED | OUTPATIENT
Start: 2024-01-23 | End: 2024-01-25

## 2024-01-23 NOTE — PROGRESS NOTES
Orders:    Plavix to be:  75mg by mouth daily for internal carotid artery thrombosis  Discontinue Senna-S orders  Start Senna-S 1 tab po BID for constipation    New orders for Hydromorphone:   2-4 mg by mouth every 4 hours as needed.  2mg for pain rated 4-6 and 4mg for pain rated 7/10 due to thoracic  spinal fusion      JAVIER Quintero CNP

## 2024-01-25 ENCOUNTER — TRANSITIONAL CARE UNIT VISIT (OUTPATIENT)
Dept: GERIATRICS | Facility: CLINIC | Age: 64
End: 2024-01-25
Payer: MEDICARE

## 2024-01-25 VITALS
WEIGHT: 165 LBS | OXYGEN SATURATION: 96 % | BODY MASS INDEX: 32.39 KG/M2 | SYSTOLIC BLOOD PRESSURE: 113 MMHG | RESPIRATION RATE: 18 BRPM | DIASTOLIC BLOOD PRESSURE: 76 MMHG | HEART RATE: 91 BPM | TEMPERATURE: 97.7 F | HEIGHT: 60 IN

## 2024-01-25 DIAGNOSIS — Z98.1 S/P SPINAL FUSION: ICD-10-CM

## 2024-01-25 DIAGNOSIS — Z72.0 TOBACCO USER: Chronic | ICD-10-CM

## 2024-01-25 DIAGNOSIS — G45.9 TIA (TRANSIENT ISCHEMIC ATTACK): ICD-10-CM

## 2024-01-25 DIAGNOSIS — M51.34 DDD (DEGENERATIVE DISC DISEASE), THORACIC: Primary | ICD-10-CM

## 2024-01-25 DIAGNOSIS — J44.9 CHRONIC OBSTRUCTIVE PULMONARY DISEASE, UNSPECIFIED COPD TYPE (H): Chronic | ICD-10-CM

## 2024-01-25 DIAGNOSIS — K59.03 DRUG-INDUCED CONSTIPATION: ICD-10-CM

## 2024-01-25 DIAGNOSIS — F41.9 ANXIETY: Chronic | ICD-10-CM

## 2024-01-25 DIAGNOSIS — G25.81 RESTLESS LEGS SYNDROME (RLS): Chronic | ICD-10-CM

## 2024-01-25 PROCEDURE — 99310 SBSQ NF CARE HIGH MDM 45: CPT | Performed by: NURSE PRACTITIONER

## 2024-01-25 RX ORDER — OXYCODONE HYDROCHLORIDE 5 MG/1
TABLET ORAL
Qty: 60 TABLET | Refills: 0 | Status: SHIPPED | OUTPATIENT
Start: 2024-01-25 | End: 2024-02-01

## 2024-01-25 NOTE — PROGRESS NOTES
[facility] GERIATRICS  INITIAL VISIT NOTE  January 25, 2024      PRIMARY CARE PROVIDER AND CLINIC:  No primary care provider on file. No primary physician on file.    [facility] Medical Record Number:  [id number]  Place of Service where encounter took place:  [facility] [[id number]]    Chief Complaint   Patient presents with    Hospital F/U       HPI:    Vesta Rodriguez is a 63 year old  (1960) female was admitted to the above facility from  [facility]. Hospital stay 1/15/24 through 1/23/24 where they were admitted for elective spinal fusion surgery of DDD - thoracic spine  Now admitted to this facility for  rehab, medical management, and nursing care.      History obtained from: facility chart records, facility staff, patient report, Care Everywhere Epic chart review, and discharge summary .      Brief Hospital Course: Vesta is a 63 year old female with extensive spine history of DDD.  She also has COPD, CATALINA, tobacco use, anxiety, and history of TIA.    Vesta underwent an elective spinal fusion of her thoracic area without complications.  Received routine ABX therapy after the surgery and IV pain control.  Prior to discharge, was transitioned over to oral Dilaudid.  She met all the goals for therapy at the hospital.  Able to discharge to TCU for rehab with wearing a TLSO brace.     TCU Course:   Meeting Vesta today to see how she is doing and review her medications with her.  Moved across the hallway to have privacy as Vesta shares a room.    Nursing left a message that Vesta wanted to ask if she could have oxycodone instead of Dilaudid.  Not sure why she got Dilaudid instead.  Vesta stated she was on 7.5mg of oxycodone before she had her surgery.  Discussed today a medication regimen for pain.      Vesta is really focused on the fact she will not be getting a shower here daily.  If she knew that, she may have not come for rehab.  She also mentions  she knows she can not take a shower by herself as she is limited on ROM and can feel things move in her upper back.  She is pretty nervous about this.  She lives with family and may need to work it out with them of to help her with showering.    Expect she will be here short term regardless but how short of time, not sure.    CODE STATUS/ADVANCE DIRECTIVES: CPR/Full code     ALLERGIES:  Allergies   Allergen Reactions    Ambien [Zolpidem Tartrate]      Mental status changes    Asa [Aspirin]      Accidental overdose.    Celexa [Citalopram Hydrobromide]      review with pt., i cant' find side effect in chart. 06 Sylvie Mederos MD      Doxychel [Doxycycline Hyclate]     Gabapentin      Rectalurgency     Lyrica [Pregabalin]     Nitroglycerin Other (See Comments)     Vomiting    Sertraline     Tape [Adhesive Tape]     Vancomycin     Zoloft      Wt gain        PAST MEDICAL HISTORY:   Past Medical History:   Diagnosis Date    Anemia     Anemia due to blood loss, acute 10/21/2012    Anxiety     Back pain     Chronic cholecystitis     Degeneration of cervical intervertebral disc     04    Degeneration of lumbar or lumbosacral intervertebral disc     Major depression in remission (H)     Osteoporosis     Right ureteral stone     RLS (restless legs syndrome)     Sleep disturbance     UTI (urinary tract infection)     RECENT- ON ANTIBIOTIC, STILL BACTERIA IN URINE    Vitamin D deficiency        PAST SURGICAL HISTORY:   Past Surgical History:   Procedure Laterality Date    BACK SURGERY      vertebroplasty, lumbar and cervical fusions    BACK SURGERY  2017    Anterior Lateral Fusion Lumbar 2-3 Bilateral    BACK SURGERY Bilateral 2017    Posterior Fusion L2-3, Decompression Lumbar 1-3, Hardware Removal Lumbar 3-4     SECTION      CHOLECYSTECTOMY      FUSION CERVICAL ANTERIOR TWO LEVELS      C 5-7    FUSION LUMBAR ANTERIOR THREE+ LEVELS      L3- SI    HYSTERECTOMY      HYSTERECTOMY, DEYSI      no  oophorectomy    INJECT EPIDURAL TRANSFORAMINAL Bilateral 3/9/2017    Procedure: INJECT EPIDURAL TRANSFORAMINAL;  Surgeon: Pérez Valle MD;  Location: [facility] OR    INJECT EPIDURAL TRANSFORAMINAL N/A 2018    Procedure: INJECT EPIDURAL TRANSFORAMINAL;  transforaminal epidural steroid injection, thoracic 12 - lumbar 1;  Surgeon: Pérez Valle MD;  Location: [facility] OR    LAPAROSCOPIC CHOLECYSTECTOMY  2012    Procedure: LAPAROSCOPIC CHOLECYSTECTOMY;  Laparoscopic vs Open Cholecystectomy;  Surgeon: Esperanza Quinn MD;  Location: [facility] OR    LASER HOLMIUM LITHOTRIPSY URETER(S), INSERT STENT, COMBINED  2012    Procedure: COMBINED CYSTOSCOPY, URETEROSCOPY, LASER HOLMIUM LITHOTRIPSY URETER(S), INSERT STENT;  Cystoscopy, Right Ureteroscopy Holmium Laser Lithotripsy , right ureteral stent placement *Latex Safe*;  Surgeon: Joshua Rm MD;  Location: [facility] OR    LUMBAR FUSION N/A 2017    Procedure: STAGE 2 - POSTERIOR FUSION L2-3 BILATERAL, DECOMPRESSION L1-3 BILATERAL, HARDWARE REMOVAL L3-4 BILATERAL;  Surgeon: Tyler Mckenzie MD;  [place] [facility] [place]  Service:     ID ARTHRODESIS ANT INTERBODY MIN DISCECTOMY,LUMBAR Bilateral 2017    Procedure: STAGE 1 - ANTERIOR LATERAL FUSION L2-3 BILATERAL;  Surgeon: Tyler Mckenzie MD;  [place] [facility] [place]  Service: Spine    SURGICAL HISTORY OF -        section    SURGICAL HISTORY OF -       Tubal ligation    SURGICAL HISTORY OF -       Post hip fracture    SURGICAL HISTORY OF -       hardware removal, right iliac crest     TUBAL LIGATION      VERTEBROPLASTY         FAMILY HISTORY:   Family History   Problem Relation Age of Onset    Heart Disease Brother     Breast Cancer Mother          age 31    Alcohol/Drug Other        SOCIAL HISTORY:   Patient's living condition: lives with family, daughter and her children     MEDICATIONS  Post Discharge Medication Reconciliation Status: discharge medications  reconciled and changed, per note/orders.  Current Outpatient Medications   Medication Sig Dispense Refill    albuterol (PROAIR HFA/PROVENTIL HFA/VENTOLIN HFA) 108 (90 Base) MCG/ACT inhaler Inhale 2 puffs into the lungs every 4 hours as needed for shortness of breath / dyspnea or wheezing 1 Inhaler 0    aspirin (ASA) 81 MG chewable tablet Take 1 tablet (81 mg) by mouth daily      atorvastatin (LIPITOR) 40 MG tablet Take 1 tablet (40 mg) by mouth daily      betamethasone dipropionate (DIPROSONE) 0.05 % external ointment Topically to affected areas daily PRN      clopidogrel (PLAVIX) 75 MG tablet Take 1 tablet (75 mg) by mouth daily 30 tablet 3    furosemide (LASIX) 40 MG tablet Take 1 tablet (40 mg) by mouth daily      magnesium hydroxide (MILK OF MAGNESIA) 400 MG/5ML suspension Take 30 mLs by mouth every 3 days 473 mL 0    methocarbamol (ROBAXIN) 750 MG tablet 1 - 2 tabs by mouth every 6 hours PRN.  1 tab for mild-moderate muscle spasms and 2 tabs for moderate-severe muscle spasms 30 tablet 3    order for DME Equipment being ordered: CPAP      oxyCODONE (ROXICODONE) 5 MG tablet 7.5mg (1.5tabs) by mouth at 6am, 12N, 6pm, 12M and 5mg every 6 hours PRN 60 tablet 0    potassium chloride ER (K-TAB/KLOR-CON) 10 MEQ CR tablet 20meq by mouth daily for 3 days then 10meq po daily      pramipexole (MIRAPEX) 1.5 MG tablet Take 1 tablet (1.5 mg) by mouth 3 times daily      topiramate (TOPAMAX) 25 MG tablet Take 2 tablets (50 mg) by mouth 2 times daily 60 tablet 0    umeclidinium (INCRUSE ELLIPTA) 62.5 MCG/ACT inhaler Inhale 1 puff into the lungs daily      vitamin D3 (CHOLECALCIFEROL) 50 mcg (2000 units) tablet Take 1 tablet (50 mcg) by mouth daily         ROS:  10 point ROS neg other than the symptoms noted above in the HPI.      PHYSICAL EXAM:  /76   Pulse 91   Temp 97.7  F (36.5  C)   Resp 18   Ht 1.524 m (5')   Wt 74.8 kg (165 lb)   SpO2 96%   BMI 32.22 kg/m    Physical Exam  Vitals and nursing note reviewed.    Constitutional:       Comments: Wearing a TLSO brace but connected to an [place] Collar.  No acute distress.  Walking slowly.    HENT:      Head: Normocephalic.      Right Ear: External ear normal.      Left Ear: External ear normal.      Ears:      Comments: Did look in her ears and able to see her TM's that are pearly gray.  No hearing aides.     Nose: Nose normal.      Mouth/Throat:      Mouth: Mucous membranes are moist.      Pharynx: Oropharynx is clear.   Eyes:      Extraocular Movements: Extraocular movements intact.   Neck:      Comments: Wearing ASPEN collar  Cardiovascular:      Comments: Radial pulse is regular and strong.  TLSO brace prevents listening to lungs.    Pulmonary:      Effort: Pulmonary effort is normal.      Comments: TLSO brace does not allow to listen posterior lungs  Abdominal:      General: Bowel sounds are normal.      Palpations: Abdomen is soft.   Genitourinary:     Comments: Able to get self to bathroom.  Musculoskeletal:      Comments: ROM is limited due to surgery.  Can not lift more than 5 lbs.  Wears the TLSO brace.  Rises from a chair using the arm rests to lift self up   Skin:     General: Skin is warm and dry.      Comments: Incision on back has stitches present according to Vesta.   Neurological:      Mental Status: She is alert and oriented to person, place, and time.      Gait: Gait abnormal.   Psychiatric:         Behavior: Behavior normal.         Thought Content: Thought content normal.      LABORATORY/IMAGING DATA:  [facility]    [facility]  HgB = 10.7    ASSESSMENT/PLAN:  (M51.34) DDD (degenerative disc disease), thoracic  (primary encounter diagnosis)  (Z98.1) S/P spinal fusion  Comment: Vesta asked if able to have oxycodone 7.5mg twice a day and offered PRN as well.  At the hospital she was getting around the clock dosing and worried she may not get PRN in a timely fashion.  Agreed to schedule the 7.5mg every 6 hours and then 5mg po  every 6 hours prn.  Does have Robaxin order of 750mg 1 or 2 tabs as needed.    Her goal is to get off the oxycodone as the pain subsides.    Vesta is able to tell this NP when she has her upcoming appointments for follow up with plastic surgery and spine clinic.    Will be working with therapies while here.  Did speak with them and they are aware of her desire to have a shower daily.  They will see what might be able to be done.    Plan: oxyCODONE (ROXICODONE) 5 MG tablet    (K59.03) Drug-induced constipation  Comment: Vesta is requesting to have MOM 30cc given.  Agreed to do it every 3 days.    Discontinue senna-s order  Plan: magnesium hydroxide (MILK OF MAGNESIA) 400         MG/5ML suspension    (G25.81) Restless legs syndrome (RLS)  Comment: Vesta made a comment that at least her medication has been given correctly.  Mirapex 1.5mg po TID.  Could see on occasion during visit, her legs bounce.    (G45.9) TIA (transient ischemic attack)  Comment: currently on Plavix 150mg po daily.  Came with an order for 300mg loading first day then 75mg daily.  Had to read the discharge visit carefully to find out the meaning behind the order on the discharge summary.  Was restarted with Plavix in the hospital and so should have been back on her every maintenance dose.    (J44.9) Chronic obstructive pulmonary disease, unspecified COPD type (H)  (Z72.0) Tobacco user  Comment: do not think she will be smoking here.  If she starts to smoke she has to pass the smoking test for safety.  Has a PRN albuterol inhaler.    (F41.9) Anxiety  Comment: has some anxiety going on as her expectations are not being met.  Try to let her know that where every she goes, lost often bathing is once a week unless you have a room with a private bathroom and maybe be able to get away with the daily bathing.    Vesta has had about 11 surgeries on her back and so she has been through this before.  This is not new for her to have restrictions.        Orders:    Discontinue Dilaudid when the oxycodone arrives  Start Oxycodone 5mg - give 1.5 tabs (7.5mg) po every 6 hours at 6am, 12N, 6pm and 12N  Discontinue senna-S  MOM 30cc po every 3 days for constipation.      Total time spent with patient visit at the [facility] nursing [facility] was 55 minutes including patient visit and review of past records. Greater than 50% of total time spent with counseling and coordinating care due to complicated history, discussion of pain medication, goals of care and discussion of discharge plans    Electronically signed by:  JAVIER Quintero CNP

## 2024-01-25 NOTE — LETTER
1/25/2024        RE: Vesta Rodriguez  [address] [place] [address]        [facility]  INITIAL VISIT NOTE  January 25, 2024      PRIMARY CARE PROVIDER AND CLINIC:  No primary care provider on file. No primary physician on file.    [facility] Medical Record Number:  [id number]  Place of Service where encounter took place:  [facility] AT [facility] ([facility]) [[id number]]    Chief Complaint   Patient presents with     Hospital F/U       HPI:    Vesta Rodriguez is a 63 year old  (1960) female was admitted to the above facility from  [facility]. Hospital stay 1/15/24 through 1/23/24 where they were admitted for elective spinal fusion surgery of DDD - thoracic spine  Now admitted to this facility for  rehab, medical management, and nursing care.      History obtained from: facility chart records, facility staff, patient report, Care Everywhere Epic chart review, and discharge summary .      Brief Hospital Course: Vesta is a 63 year old female with extensive spine history of DDD.  She also has COPD, CATALINA, tobacco use, anxiety, and history of TIA.    Vesta underwent an elective spinal fusion of her thoracic area without complications.  Received routine ABX therapy after the surgery and IV pain control.  Prior to discharge, was transitioned over to oral Dilaudid.  She met all the goals for therapy at the hospital.  Able to discharge to TCU for rehab with wearing a TLSO brace.     TCU Course:   Meeting Vesta today to see how she is doing and review her medications with her.  Moved across the hallway to have privacy as Vesta shares a room.    Nursing left a message that Vesta wanted to ask if she could have oxycodone instead of Dilaudid.  Not sure why she got Dilaudid instead.  Vesta stated she was on 7.5mg of oxycodone before she had her surgery.  Discussed today a medication regimen for pain.      Vesta is really focused on the fact she will not be getting a shower here  daily.  If she knew that, she may have not come for rehab.  She also mentions she knows she can not take a shower by herself as she is limited on ROM and can feel things move in her upper back.  She is pretty nervous about this.  She lives with family and may need to work it out with them of to help her with showering.    Expect she will be here short term regardless but how short of time, not sure.    CODE STATUS/ADVANCE DIRECTIVES: CPR/Full code     ALLERGIES:  Allergies   Allergen Reactions     Ambien [Zolpidem Tartrate]      Mental status changes     Asa [Aspirin]      Accidental overdose.     Celexa [Citalopram Hydrobromide]      review with pt., i cant' find side effect in chart. 06 Sylvie Mederos MD       Doxychel [Doxycycline Hyclate]      Gabapentin      Rectalurgency      Lyrica [Pregabalin]      Nitroglycerin Other (See Comments)     Vomiting     Sertraline      Tape [Adhesive Tape]      Vancomycin      Zoloft      Wt gain        PAST MEDICAL HISTORY:   Past Medical History:   Diagnosis Date     Anemia      Anemia due to blood loss, acute 10/21/2012     Anxiety      Back pain      Chronic cholecystitis      Degeneration of cervical intervertebral disc     04     Degeneration of lumbar or lumbosacral intervertebral disc      Major depression in remission (H)      Osteoporosis      Right ureteral stone      RLS (restless legs syndrome)      Sleep disturbance      UTI (urinary tract infection)     RECENT- ON ANTIBIOTIC, STILL BACTERIA IN URINE     Vitamin D deficiency        PAST SURGICAL HISTORY:   Past Surgical History:   Procedure Laterality Date     BACK SURGERY      vertebroplasty, lumbar and cervical fusions     BACK SURGERY  2017    Anterior Lateral Fusion Lumbar 2-3 Bilateral     BACK SURGERY Bilateral 2017    Posterior Fusion L2-3, Decompression Lumbar 1-3, Hardware Removal Lumbar 3-4      SECTION       CHOLECYSTECTOMY       FUSION CERVICAL ANTERIOR TWO LEVELS      C 5-7      FUSION LUMBAR ANTERIOR THREE+ LEVELS      L3- SI     HYSTERECTOMY       HYSTERECTOMY, DEYSI      no oophorectomy     INJECT EPIDURAL TRANSFORAMINAL Bilateral 3/9/2017    Procedure: INJECT EPIDURAL TRANSFORAMINAL;  Surgeon: Pérez Valle MD;  Location: [facility] OR     INJECT EPIDURAL TRANSFORAMINAL N/A 2018    Procedure: INJECT EPIDURAL TRANSFORAMINAL;  transforaminal epidural steroid injection, thoracic 12 - lumbar 1;  Surgeon: Pérez Valle MD;  Location: [facility] OR     LAPAROSCOPIC CHOLECYSTECTOMY  2012    Procedure: LAPAROSCOPIC CHOLECYSTECTOMY;  Laparoscopic vs Open Cholecystectomy;  Surgeon: Esperanza Quinn MD;  Location: [facility]     LASER HOLMIUM LITHOTRIPSY URETER(S), INSERT STENT, COMBINED  2012    Procedure: COMBINED CYSTOSCOPY, URETEROSCOPY, LASER HOLMIUM LITHOTRIPSY URETER(S), INSERT STENT;  Cystoscopy, Right Ureteroscopy Holmium Laser Lithotripsy , right ureteral stent placement *Latex Safe*;  Surgeon: Joshua Rm MD;  Location: [facility] OR     LUMBAR FUSION N/A 2017    Procedure: STAGE 2 - POSTERIOR FUSION L2-3 BILATERAL, DECOMPRESSION L1-3 BILATERAL, HARDWARE REMOVAL L3-4 BILATERAL;  Surgeon: Tyler Mckenzie MD;  Location: [facility];  Service:      OR ARTHRODESIS ANT INTERBODY MIN DISCECTOMY,LUMBAR Bilateral 2017    Procedure: STAGE 1 - ANTERIOR LATERAL FUSION L2-3 BILATERAL;  Surgeon: Tyler Mckenzie MD;  Location: [facility];  Service: Spine     SURGICAL HISTORY OF -        section     SURGICAL HISTORY OF -       Tubal ligation     SURGICAL HISTORY OF -       Post hip fracture     SURGICAL HISTORY OF -       hardware removal, right iliac crest      TUBAL LIGATION       VERTEBROPLASTY         FAMILY HISTORY:   Family History   Problem Relation Age of Onset     Heart Disease Brother      Breast Cancer Mother          age 31     Alcohol/Drug Other        SOCIAL HISTORY:   Patient's living condition: lives with family,  daughter and her children     MEDICATIONS  Post Discharge Medication Reconciliation Status: discharge medications reconciled and changed, per note/orders.  Current Outpatient Medications   Medication Sig Dispense Refill     albuterol (PROAIR HFA/PROVENTIL HFA/VENTOLIN HFA) 108 (90 Base) MCG/ACT inhaler Inhale 2 puffs into the lungs every 4 hours as needed for shortness of breath / dyspnea or wheezing 1 Inhaler 0     aspirin (ASA) 81 MG chewable tablet Take 1 tablet (81 mg) by mouth daily       atorvastatin (LIPITOR) 40 MG tablet Take 1 tablet (40 mg) by mouth daily       betamethasone dipropionate (DIPROSONE) 0.05 % external ointment Topically to affected areas daily PRN       clopidogrel (PLAVIX) 75 MG tablet Take 1 tablet (75 mg) by mouth daily 30 tablet 3     furosemide (LASIX) 40 MG tablet Take 1 tablet (40 mg) by mouth daily       magnesium hydroxide (MILK OF MAGNESIA) 400 MG/5ML suspension Take 30 mLs by mouth every 3 days 473 mL 0     methocarbamol (ROBAXIN) 750 MG tablet 1 - 2 tabs by mouth every 6 hours PRN.  1 tab for mild-moderate muscle spasms and 2 tabs for moderate-severe muscle spasms 30 tablet 3     order for DME Equipment being ordered: CPAP       oxyCODONE (ROXICODONE) 5 MG tablet 7.5mg (1.5tabs) by mouth at 6am, 12N, 6pm, 12M and 5mg every 6 hours PRN 60 tablet 0     potassium chloride ER (K-TAB/KLOR-CON) 10 MEQ CR tablet 20meq by mouth daily for 3 days then 10meq po daily       pramipexole (MIRAPEX) 1.5 MG tablet Take 1 tablet (1.5 mg) by mouth 3 times daily       topiramate (TOPAMAX) 25 MG tablet Take 2 tablets (50 mg) by mouth 2 times daily 60 tablet 0     umeclidinium (INCRUSE ELLIPTA) 62.5 MCG/ACT inhaler Inhale 1 puff into the lungs daily       vitamin D3 (CHOLECALCIFEROL) 50 mcg (2000 units) tablet Take 1 tablet (50 mcg) by mouth daily         ROS:  10 point ROS neg other than the symptoms noted above in the HPI.      PHYSICAL EXAM:  /76   Pulse 91   Temp 97.7  F (36.5  C)   Resp  18   Ht 1.524 m (5')   Wt 74.8 kg (165 lb)   SpO2 96%   BMI 32.22 kg/m    Physical Exam  Vitals and nursing note reviewed.   Constitutional:       Comments: Wearing a TLSO brace but connected to an [place] Collar.  No acute distress.  Walking slowly.    HENT:      Head: Normocephalic.      Right Ear: External ear normal.      Left Ear: External ear normal.      Ears:      Comments: Did look in her ears and able to see her TM's that are pearly gray.  No hearing aides.     Nose: Nose normal.      Mouth/Throat:      Mouth: Mucous membranes are moist.      Pharynx: Oropharynx is clear.   Eyes:      Extraocular Movements: Extraocular movements intact.   Neck:      Comments: Wearing ASPEN collar  Cardiovascular:      Comments: Radial pulse is regular and strong.  TLSO brace prevents listening to lungs.    Pulmonary:      Effort: Pulmonary effort is normal.      Comments: TLSO brace does not allow to listen posterior lungs  Abdominal:      General: Bowel sounds are normal.      Palpations: Abdomen is soft.   Genitourinary:     Comments: Able to get self to bathroom.  Musculoskeletal:      Comments: ROM is limited due to surgery.  Can not lift more than 5 lbs.  Wears the TLSO brace.  Rises from a chair using the arm rests to lift self up   Skin:     General: Skin is warm and dry.      Comments: Incision on back has stitches present according to Vesta.   Neurological:      Mental Status: She is alert and oriented to person, place, and time.      Gait: Gait abnormal.   Psychiatric:         Behavior: Behavior normal.         Thought Content: Thought content normal.      LABORATORY/IMAGING DATA:  [facility] and[facility]or [facility]    1/21/24  HgB = 10.7    ASSESSMENT/PLAN:  (M51.34) DDD (degenerative disc disease), thoracic  (primary encounter diagnosis)  (Z98.1) S/P spinal fusion  Comment: Vesta asked if able to have oxycodone 7.5mg twice a day and offered PRN as well.  At the hospital she was getting around the  clock dosing and worried she may not get PRN in a timely fashion.  Agreed to schedule the 7.5mg every 6 hours and then 5mg po every 6 hours prn.  Does have Robaxin order of 750mg 1 or 2 tabs as needed.    Her goal is to get off the oxycodone as the pain subsides.    Vesta is able to tell this NP when she has her upcoming appointments for follow up with plastic surgery and spine clinic.    Will be working with therapies while here.  Did speak with them and they are aware of her desire to have a shower daily.  They will see what might be able to be done.    Plan: oxyCODONE (ROXICODONE) 5 MG tablet    (K59.03) Drug-induced constipation  Comment: Vesta is requesting to have MOM 30cc given.  Agreed to do it every 3 days.    Discontinue senna-s order  Plan: magnesium hydroxide (MILK OF MAGNESIA) 400         MG/5ML suspension    (G25.81) Restless legs syndrome (RLS)  Comment: Vesta made a comment that at least her medication has been given correctly.  Mirapex 1.5mg po TID.  Could see on occasion during visit, her legs bounce.    (G45.9) TIA (transient ischemic attack)  Comment: currently on Plavix 150mg po daily.  Came with an order for 300mg loading first day then 75mg daily.  Had to read the discharge visit carefully to find out the meaning behind the order on the discharge summary.  Was restarted with Plavix in the hospital and so should have been back on her every maintenance dose.    (J44.9) Chronic obstructive pulmonary disease, unspecified COPD type (H)  (Z72.0) Tobacco user  Comment: do not think she will be smoking here.  If she starts to smoke she has to pass the smoking test for safety.  Has a PRN albuterol inhaler.    (F41.9) Anxiety  Comment: has some anxiety going on as her expectations are not being met.  Try to let her know that where every she goes, lost often bathing is once a week unless you have a room with a private bathroom and maybe be able to get away with the daily bathing.    Vesta has had  about 11 surgeries on her back and so she has been through this before.  This is not new for her to have restrictions.       Orders:    Discontinue Dilaudid when the oxycodone arrives  Start Oxycodone 5mg - give 1.5 tabs (7.5mg) po every 6 hours at 6am, 12N, 6pm and 12N  Discontinue senna-S  MOM 30cc po every 3 days for constipation.      Total time spent with patient visit at the skilled nursing facility was 55 minutes including patient visit and review of past records. Greater than 50% of total time spent with counseling and coordinating care due to complicated history, discussion of pain medication, goals of care and discussion of discharge plans    Electronically signed by:  JAVIER Quintero CNP               Sincerely,        JAVIER Quintero CNP

## 2024-01-29 ENCOUNTER — DISCHARGE SUMMARY NURSING HOME (OUTPATIENT)
Dept: GERIATRICS | Facility: CLINIC | Age: 64
End: 2024-01-29
Payer: MEDICARE

## 2024-01-29 VITALS
RESPIRATION RATE: 16 BRPM | TEMPERATURE: 97.8 F | BODY MASS INDEX: 33.61 KG/M2 | OXYGEN SATURATION: 100 % | HEIGHT: 60 IN | HEART RATE: 78 BPM | SYSTOLIC BLOOD PRESSURE: 131 MMHG | DIASTOLIC BLOOD PRESSURE: 77 MMHG | WEIGHT: 171.2 LBS

## 2024-01-29 DIAGNOSIS — Z98.1 S/P SPINAL FUSION: ICD-10-CM

## 2024-01-29 DIAGNOSIS — J44.9 CHRONIC OBSTRUCTIVE PULMONARY DISEASE, UNSPECIFIED COPD TYPE (H): ICD-10-CM

## 2024-01-29 DIAGNOSIS — G45.9 TIA (TRANSIENT ISCHEMIC ATTACK): ICD-10-CM

## 2024-01-29 DIAGNOSIS — M51.34 DDD (DEGENERATIVE DISC DISEASE), THORACIC: Primary | ICD-10-CM

## 2024-01-29 DIAGNOSIS — G25.81 RESTLESS LEGS SYNDROME (RLS): ICD-10-CM

## 2024-01-29 DIAGNOSIS — Z72.0 TOBACCO USER: ICD-10-CM

## 2024-01-29 DIAGNOSIS — M62.838 MUSCLE SPASM: ICD-10-CM

## 2024-01-29 PROCEDURE — 99316 NF DSCHRG MGMT 30 MIN+: CPT | Performed by: NURSE PRACTITIONER

## 2024-01-29 NOTE — PROGRESS NOTES
[facility] GERIATRICS DISCHARGE SUMMARY  PATIENT'S NAME: Vesta Rodriguez  YOB: 1960  MEDICAL RECORD NUMBER:  [id number]  Place of Service where encounter took place:  [facility] [place] [facility] ([facility]) [[id number]]    PRIMARY CARE PROVIDER AND CLINIC RESPONSIBLE AFTER TRANSFER:   Raji Reynoso MD, [address] / [place] [address]    Non-FMG Provider     Transferring providers: JAVIER Quintero CNP, Dr. Ti MD  Recent Hospitalization/ED:  [facility] stay 1/15/24 to 24.  Date of SNF Admission:  24  Date of SNF (anticipated) Discharge: 2024  Discharged to: with family   Cognitive Scores: BIMS: 1515  Physical Function:  able to ambulate with a walker, TLSO brace to be worn when out of bed or showering  DME: No DME needed    CODE STATUS/ADVANCE DIRECTIVES DISCUSSION:  CPR     ALLERGIES: Ambien [zolpidem tartrate], Asa [aspirin], Celexa [citalopram hydrobromide], Doxychel [doxycycline hyclate], Gabapentin, Lyrica [pregabalin], Nitroglycerin, Sertraline, Tape [adhesive tape], Vancomycin, and Zoloft    NURSING FACILITY COURSE   Medication Changes/Rationale:   24  change oxycodone order to the followin.5mg every 6 hours and 5mg every 6 hours PRN for s/p spinal fusion.  MOM 30cc every 3rd day, discontinue Senna-S per request, discontinue Dilaudid supply when oxycodone arrives.  24  upon discharge, please change oxycodone to be:  oxycodone 2.5mg tabs - take 5mg (2 tabs) - 7.5mg (3 tabs) every 4 hours PRN for pain from spinal fusion  24  clarify TLSO brace:  to wear when out of bed.  Do not need to wear in bed or when showering.  May discharge home with remaining oxycodone and any open medications.  Pt driven discharge.  Home Care PT/OT/RN/HHA to eval and treat for DDD, thoracic spine, S/P spinal fusion, thoracic area, impaired mobility and ADLs  24  monitor hangnail on left pointer finger.  Discontinue  treatment when healed    Summary of nursing facility stay:   (M51.34) DDD (degenerative disc disease), thoracic  (primary encounter diagnosis)  (Z98.1) S/P spinal fusion  Comment: originally came to see Vesta today due to her c/o numbness of her shoulder blade area but then to find out she was telling staff she wants to discharge and was willing to go AMA.  Nurse manager spoke to her and explained that if she goes AMA then she will not get help with services from this facility like home care and no medications.  Would rather work on discharging her to go home safely.  She has been very fixated on not having a daily shower and feels that she can go home and recuperate just as well there.     Found Vesta in her room.  She had her brace off as nursing working with her and so able to see her incision on her upper back with thick black stitches present.  No signs of infection.  Recommended to have this covered for protection so nothing tugs at the stiches that are planned to come out in a 2 stage event with provider.  Explained to her that the area surrounding her incision would be numb due to disrupted nerve endings and so will be numb.  Can not say if it will go away.  Gave some advise of how to deal with pain/itching and can not feel the area to touch.      Also clarified her TLSO brace order as nursing states there are two orders.  This NP found both orders in her discharge paperwork in Care Everywhere.  Best is that she can have it off while in bed and showering and needs to wear while up which staff do not feel she is the most compliant.  She knows she can not sleep with it on.    Will change her oxycodone order from 7.5mg every 6 hours and 5mg po every 6 hours PRN.  Staff wrote a note that she is not manging until 6 hours.  Has the PRN available for breakthrough.  Not willing to change her schedule until she leaves and then will have an order for 5mg - 7.5mg po every 4 hours PRN for pain rating 1-5 can have 2 -  2.5mg tabs (5mg) and pain 6-10 can have 3 tabs (7.5mg).  right now she has 67 1/2 tabs (2.5mg) but will anticipate ordering from her pharmacy by the end of the week before discharge.      (M62.838) Muscle spasm  Comment: currently has an order for Robaxin 750mg tab 1-2 tabs every 6 hour PRN pending on pain level.  No changes.    (G25.81) Restless legs syndrome (RLS)  Comment: this has been important to her to get her medications for this as do see her legs most slightly when at rest.  Mirapex 1.5mg TID.  Has Topamax 50mg twice a day    (J44.9) Chronic obstructive pulmonary disease, unspecified COPD type (H)  (Z72.0) Tobacco user  Comment: do not think she was smoking here.  Another reason probably for her to return home.  Has Incruse Ellipta scheduled and PRN Albuterol inhaler.  No changes.      (G45.9) TIA (transient ischemic attack)  Comment: remains on Plavix 75mg daily, ASa 81mg daily and Lipitor 40mg po daily.   No signs of neurological events.    Discharge Medications:  MED REC REQUIRED{ medications reconciled.       Current Outpatient Medications   Medication Sig Dispense Refill    albuterol (PROAIR HFA/PROVENTIL HFA/VENTOLIN HFA) 108 (90 Base) MCG/ACT inhaler Inhale 2 puffs into the lungs every 4 hours as needed for shortness of breath / dyspnea or wheezing 1 Inhaler 0    aspirin (ASA) 81 MG chewable tablet Take 1 tablet (81 mg) by mouth daily      atorvastatin (LIPITOR) 40 MG tablet Take 1 tablet (40 mg) by mouth daily      betamethasone dipropionate (DIPROSONE) 0.05 % external ointment Topically to affected areas daily PRN      clopidogrel (PLAVIX) 75 MG tablet Take 1 tablet (75 mg) by mouth daily 30 tablet 3    furosemide (LASIX) 40 MG tablet for SO Take 1 tablet (40 mg) by mouth daily      magnesium hydroxide (MILK OF MAGNESIA) 400 MG/5ML suspension Take 30 mLs by mouth every 3 days 473 mL 0    methocarbamol (ROBAXIN) 750 MG tablet 1 - 2 tabs by mouth every 6 hours PRN.  1 tab for mild-moderate muscle  spasms and 2 tabs for moderate-severe muscle spasms 30 tablet 3    order for DME Equipment being ordered: CPAP      oxyCODONE (ROXICODONE) 5 MG tablet Upon discharge: 5mg - 7.5mg po every 4 hours as needed.  Pain rated 1-5 = 2- 2.5mg tabs and pain 6-10 3-2.5mg tabs  60 tablet 0    potassium chloride ER (K-TAB/KLOR-CON) 10 MEQ CR tablet 20meq by mouth daily for 3 days then 10meq po daily      pramipexole (MIRAPEX) 1.5 MG tablet Take 1 tablet (1.5 mg) by mouth 3 times daily      topiramate (TOPAMAX) 25 MG tablet Take 2 tablets (50 mg) by mouth 2 times daily 60 tablet 0    umeclidinium (INCRUSE ELLIPTA) 62.5 MCG/ACT inhaler Inhale 1 puff into the lungs daily      vitamin D3 (CHOLECALCIFEROL) 50 mcg (2000 units) tablet Take 1 tablet (50 mcg) by mouth daily          Controlled medications:   Medication: oxycodone , approx 30 2.5mg  tabs given to patient at the time of discharge to take home     Past Medical History:   Past Medical History:   Diagnosis Date    Anemia     Anemia due to blood loss, acute 10/21/2012    Anxiety     Back pain     Chronic cholecystitis     Degeneration of cervical intervertebral disc     04    Degeneration of lumbar or lumbosacral intervertebral disc     Major depression in remission (H)     Osteoporosis     Right ureteral stone     RLS (restless legs syndrome)     Sleep disturbance     UTI (urinary tract infection)     RECENT- ON ANTIBIOTIC, STILL BACTERIA IN URINE    Vitamin D deficiency      Physical Exam:   Vitals: /77   Pulse 78   Temp 97.8  F (36.6  C)   Resp 16   Ht 1.524 m (5')   Wt 77.7 kg (171 lb 3.2 oz)   SpO2 100%   BMI 33.44 kg/m    BMI: Body mass index is 33.44 kg/m .  Steadily raises self out of the chair using the armrests.  Stands up and turns around to inspect her incision.  No drainage, no redness, incision is closed.  Black stitches present.  Otherwise skin is pink, warm and dry.  Alert and oriented x3.    Heart rate regular and strong.  Lungs are clear.  Good  expansion.    Able to put the TLSO brace and connecting ASPEN collar on herself.    Ambulates with a walker for safety while here.      SNF labs: Most Recent 3 CBC's:  Recent Labs   Lab Test 01/11/23  0630 01/04/23  0908 08/31/20  1336   WBC 5.5 5.3 7.0   HGB 9.2* 10.1* 11.4*   MCV 92 90 92    288 246     Most Recent 3 BMP's:  Recent Labs   Lab Test 01/11/23  0630 01/04/23  0908 08/31/20  1336    139 140   POTASSIUM 4.2 4.0 3.6   CHLORIDE 105 102 110*   CO2 19* 22 27   BUN 13.3 14.7 12   CR 0.77 0.71 0.60   ANIONGAP 16* 15 3   HELEN 9.0 9.4 8.7   * 93 83       DISCHARGE PLAN:  Follow up labs: No labs orders/due  Medical Follow Up:      Follow up with primary care provider in 2-3 weeks  Reviewed with her the specialty appointments she has and she was aware of the dates as she told this NP what they were.  [facility] scheduled appointments:  Appointments in Next Year      Jan 29, 2024  1:30 PM  Discharge Summary with JAVIER Quintero CNP  [facility] ([facility] ) [phone]           Discharge Services: Home Care:  Occupational Therapy, Physical Therapy, Registered Nurse, and Home Health Aide  Discharge Instructions Verbalized to Patient at Discharge:   TLSO brace on while out of bed.  May have off while in bed or showering.  No baths  Regular diet with regular texture.    TOTAL DISCHARGE TIME:   Greater than 30 minutes  Electronically signed by:  JAVIER Quintero CNP     Home care F2F is in a separate note under this date.

## 2024-01-29 NOTE — LETTER
2024        RE: Vesta Rodriguez  [address] [place] [address]        [facility] GERIATRICS DISCHARGE SUMMARY  PATIENT'S NAME: Vesta Rodriguez  YOB: 1960  MEDICAL RECORD NUMBER:  [id number]  Place of Service where encounter took place:  [facility]) [[id number]]    PRIMARY CARE PROVIDER AND CLINIC RESPONSIBLE AFTER TRANSFER:   Raji Reynoso MD, [address] [facility] / [place] [address]    Non-[facility] Provider     Transferring providers: Rebeka Ventura, JAVIER CNP, Dr. Ti MD  Recent Hospitalization/ED:  [facility] stay 1/15/24 to 24.  Date of SNF Admission:  24  Date of SNF (anticipated) Discharge: 2024  Discharged to: with family   Cognitive Scores: BIMS: 15/15  Physical Function:  able to ambulate with a walker, TLSO brace to be worn when out of bed or showering  DME: No DME needed    CODE STATUS/ADVANCE DIRECTIVES DISCUSSION:  CPR     ALLERGIES: Ambien [zolpidem tartrate], Asa [aspirin], Celexa [citalopram hydrobromide], Doxychel [doxycycline hyclate], Gabapentin, Lyrica [pregabalin], Nitroglycerin, Sertraline, Tape [adhesive tape], Vancomycin, and Zoloft    NURSING FACILITY COURSE   Medication Changes/Rationale:   24  change oxycodone order to the followin.5mg every 6 hours and 5mg every 6 hours PRN for s/p spinal fusion.  MOM 30cc every 3rd day, discontinue Senna-S per request, discontinue Dilaudid supply when oxycodone arrives.  24  upon discharge, please change oxycodone to be:  oxycodone 2.5mg tabs - take 5mg (2 tabs) - 7.5mg (3 tabs) every 4 hours PRN for pain from spinal fusion  24  clarify TLSO brace:  to wear when out of bed.  Do not need to wear in bed or when showering.  May discharge home with remaining oxycodone and any open medications.  Pt driven discharge.  Home Care PT/OT/RN/HHA to eval and treat for DDD, thoracic spine, S/P spinal fusion, thoracic area, impaired mobility and  ADLs  1/31/24  monitor hangnail on left pointer finger.  Discontinue treatment when healed    Summary of nursing facility stay:   (M51.34) DDD (degenerative disc disease), thoracic  (primary encounter diagnosis)  (Z98.1) S/P spinal fusion  Comment: originally came to see Vesta today due to her c/o numbness of her shoulder blade area but then to find out she was telling staff she wants to discharge and was willing to go AMA.  Nurse manager spoke to her and explained that if she goes AMA then she will not get help with services from this facility like home care and no medications.  Would rather work on discharging her to go home safely.  She has been very fixated on not having a daily shower and feels that she can go home and recuperate just as well there.     Found Vesta in her room.  She had her brace off as nursing working with her and so able to see her incision on her upper back with thick black stitches present.  No signs of infection.  Recommended to have this covered for protection so nothing tugs at the stiches that are planned to come out in a 2 stage event with provider.  Explained to her that the area surrounding her incision would be numb due to disrupted nerve endings and so will be numb.  Can not say if it will go away.  Gave some advise of how to deal with pain/itching and can not feel the area to touch.      Also clarified her TLSO brace order as nursing states there are two orders.  This NP found both orders in her discharge paperwork in Care Everywhere.  Best is that she can have it off while in bed and showering and needs to wear while up which staff do not feel she is the most compliant.  She knows she can not sleep with it on.    Will change her oxycodone order from 7.5mg every 6 hours and 5mg po every 6 hours PRN.  Staff wrote a note that she is not manging until 6 hours.  Has the PRN available for breakthrough.  Not willing to change her schedule until she leaves and then will have an order  for 5mg - 7.5mg po every 4 hours PRN for pain rating 1-5 can have 2 - 2.5mg tabs (5mg) and pain 6-10 can have 3 tabs (7.5mg).  right now she has 67 1/2 tabs (2.5mg) but will anticipate ordering from her pharmacy by the end of the week before discharge.      (M62.838) Muscle spasm  Comment: currently has an order for Robaxin 750mg tab 1-2 tabs every 6 hour PRN pending on pain level.  No changes.    (G25.81) Restless legs syndrome (RLS)  Comment: this has been important to her to get her medications for this as do see her legs most slightly when at rest.  Mirapex 1.5mg TID.  Has Topamax 50mg twice a day    (J44.9) Chronic obstructive pulmonary disease, unspecified COPD type (H)  (Z72.0) Tobacco user  Comment: do not think she was smoking here.  Another reason probably for her to return home.  Has Incruse Ellipta scheduled and PRN Albuterol inhaler.  No changes.      (G45.9) TIA (transient ischemic attack)  Comment: remains on Plavix 75mg daily, ASa 81mg daily and Lipitor 40mg po daily.   No signs of neurological events.    Discharge Medications:  MED REC REQUIRED{ medications reconciled.       Current Outpatient Medications   Medication Sig Dispense Refill     albuterol (PROAIR HFA/PROVENTIL HFA/VENTOLIN HFA) 108 (90 Base) MCG/ACT inhaler Inhale 2 puffs into the lungs every 4 hours as needed for shortness of breath / dyspnea or wheezing 1 Inhaler 0     aspirin (ASA) 81 MG chewable tablet Take 1 tablet (81 mg) by mouth daily       atorvastatin (LIPITOR) 40 MG tablet Take 1 tablet (40 mg) by mouth daily       betamethasone dipropionate (DIPROSONE) 0.05 % external ointment Topically to affected areas daily PRN       clopidogrel (PLAVIX) 75 MG tablet Take 1 tablet (75 mg) by mouth daily 30 tablet 3     furosemide (LASIX) 40 MG tablet for SO Take 1 tablet (40 mg) by mouth daily       magnesium hydroxide (MILK OF MAGNESIA) 400 MG/5ML suspension Take 30 mLs by mouth every 3 days 473 mL 0     methocarbamol (ROBAXIN) 750 MG  tablet 1 - 2 tabs by mouth every 6 hours PRN.  1 tab for mild-moderate muscle spasms and 2 tabs for moderate-severe muscle spasms 30 tablet 3     order for DME Equipment being ordered: CPAP       oxyCODONE (ROXICODONE) 5 MG tablet Upon discharge: 5mg - 7.5mg po every 4 hours as needed.  Pain rated 1-5 = 2- 2.5mg tabs and pain 6-10 3-2.5mg tabs  60 tablet 0     potassium chloride ER (K-TAB/KLOR-CON) 10 MEQ CR tablet 20meq by mouth daily for 3 days then 10meq po daily       pramipexole (MIRAPEX) 1.5 MG tablet Take 1 tablet (1.5 mg) by mouth 3 times daily       topiramate (TOPAMAX) 25 MG tablet Take 2 tablets (50 mg) by mouth 2 times daily 60 tablet 0     umeclidinium (INCRUSE ELLIPTA) 62.5 MCG/ACT inhaler Inhale 1 puff into the lungs daily       vitamin D3 (CHOLECALCIFEROL) 50 mcg (2000 units) tablet Take 1 tablet (50 mcg) by mouth daily          Controlled medications:   Medication: oxycodone , approx 30 2.5mg  tabs given to patient at the time of discharge to take home     Past Medical History:   Past Medical History:   Diagnosis Date     Anemia      Anemia due to blood loss, acute 10/21/2012     Anxiety      Back pain      Chronic cholecystitis      Degeneration of cervical intervertebral disc     04     Degeneration of lumbar or lumbosacral intervertebral disc      Major depression in remission (H)      Osteoporosis      Right ureteral stone      RLS (restless legs syndrome)      Sleep disturbance      UTI (urinary tract infection)     RECENT- ON ANTIBIOTIC, STILL BACTERIA IN URINE     Vitamin D deficiency      Physical Exam:   Vitals: /77   Pulse 78   Temp 97.8  F (36.6  C)   Resp 16   Ht 1.524 m (5')   Wt 77.7 kg (171 lb 3.2 oz)   SpO2 100%   BMI 33.44 kg/m    BMI: Body mass index is 33.44 kg/m .  Steadily raises self out of the chair using the armrests.  Stands up and turns around to inspect her incision.  No drainage, no redness, incision is closed.  Black stitches present.  Otherwise skin is  pink, warm and dry.  Alert and oriented x3.    Heart rate regular and strong.  Lungs are clear.  Good expansion.    Able to put the TLSO brace and connecting ASPEN collar on herself.    Ambulates with a walker for safety while here.      SNF labs: Most Recent 3 CBC's:  Recent Labs   Lab Test 01/11/23  0630 01/04/23  0908 08/31/20  1336   WBC 5.5 5.3 7.0   HGB 9.2* 10.1* 11.4*   MCV 92 90 92    288 246     Most Recent 3 BMP's:  Recent Labs   Lab Test 01/11/23  0630 01/04/23  0908 08/31/20  1336    139 140   POTASSIUM 4.2 4.0 3.6   CHLORIDE 105 102 110*   CO2 19* 22 27   BUN 13.3 14.7 12   CR 0.77 0.71 0.60   ANIONGAP 16* 15 3   HELEN 9.0 9.4 8.7   * 93 83       DISCHARGE PLAN:  Follow up labs: No labs orders/due  Medical Follow Up:      Follow up with primary care provider in 2-3 weeks  Reviewed with her the specialty appointments she has and she was aware of the dates as she told this NP what they were.  [facility] scheduled appointments:  Appointments in Next Year      Jan 29, 2024  1:30 PM  Discharge Summary with JAVIER Quintero CNP  [facility] ([facility] ) [phone]           Discharge Services: Home Care:  Occupational Therapy, Physical Therapy, Registered Nurse, and Home Health Aide  Discharge Instructions Verbalized to Patient at Discharge:   TLSO brace on while out of bed.  May have off while in bed or showering.  No baths  Regular diet with regular texture.    TOTAL DISCHARGE TIME:   Greater than 30 minutes  Electronically signed by:  JAVIER Quintero CNP     Home care F2F is in a separate note under this date.                  Documentation of Face-to-Face and Certification for Home Health Services     Patient: Vesta Rodriguez   YOB: 1960  MR Number: [id number]  Today's Date: 1/29/2024    I certify that patient: Vesta Rodriguez is under my care and that I, or a nurse practitioner or  physician's assistant working with me, had a face-to-face encounter that meets the physician face-to-face encounter requirements with this patient on: 1/29/2024.    This encounter with the patient was in whole, or in part, for the following medical condition, which is the primary reason for home health care: DDD, thoracic spine, s/p spinal fusion, COPD, RLS, hx of TIA.    I certify that, based on my findings, the following services are medically necessary home health services: Nursing, Occupational Therapy, and Physical Therapy.    My clinical findings support the need for the above services because: Nurse is needed: To assess pain, vitals, lung and cardiac status after changes in medications or other medical regimen.., Occupational Therapy Services are needed to assess and treat cognitive ability and address ADL safety due to impairment in upper body strength as needing to wear the TLSO brace when out of bed., and Physical Therapy Services are needed to assess and treat the following functional impairments: tranfers, endurance, balance mobility.    Further, I certify that my clinical findings support that this patient is homebound (i.e. absences from home require considerable and taxing effort and are for medical reasons or [employer] services or infrequently or of short duration when for other reasons) because: Requires assistance of another person or specialized equipment to access medical services because patient: Requires supervision of another for safe transfer...    Based on the above findings. I certify that this patient is confined to the home and needs intermittent skilled nursing care, physical therapy and/or speech therapy.  The patient is under my care, and I have initiated the establishment of the plan of care.  This patient will be followed by a physician who will periodically review the plan of care.  Physician/Provider to provide follow up care: Raji Reynoso    Responsible Medicare certified  KAREN Physician:  Dominic Mae MD  Physician Signature:    Dr. Dominic mae MD  Date: 1/29/2024      Sincerely,        JAVIER Quintero CNP

## 2024-02-01 DIAGNOSIS — Z98.1 S/P SPINAL FUSION: ICD-10-CM

## 2024-02-01 RX ORDER — OXYCODONE HYDROCHLORIDE 5 MG/1
TABLET ORAL
Qty: 30 TABLET | Refills: 0 | Status: SHIPPED | OUTPATIENT
Start: 2024-02-01

## 2024-02-01 NOTE — PROGRESS NOTES
Documentation of Face-to-Face and Certification for Home Health Services     Patient: Vesta Rodriguez   YOB: 1960  MR Number: [id number]  Today's Date: 1/29/2024    I certify that patient: Vesta Rodriguez is under my care and that I, or a nurse practitioner or physician's assistant working with me, had a face-to-face encounter that meets the physician face-to-face encounter requirements with this patient on: 1/29/2024.    This encounter with the patient was in whole, or in part, for the following medical condition, which is the primary reason for home health care: DDD, thoracic spine, s/p spinal fusion, COPD, RLS, hx of TIA.    I certify that, based on my findings, the following services are medically necessary home health services: Nursing, Occupational Therapy, and Physical Therapy.    My clinical findings support the need for the above services because: Nurse is needed: To assess pain, vitals, lung and cardiac status after changes in medications or other medical regimen.., Occupational Therapy Services are needed to assess and treat cognitive ability and address ADL safety due to impairment in upper body strength as needing to wear the TLSO brace when out of bed., and Physical Therapy Services are needed to assess and treat the following functional impairments: tranfers, endurance, balance mobility.    Further, I certify that my clinical findings support that this patient is homebound (i.e. absences from home require considerable and taxing effort and are for medical reasons or [employer] services or infrequently or of short duration when for other reasons) because: Requires assistance of another person or specialized equipment to access medical services because patient: Requires supervision of another for safe transfer...    Based on the above findings. I certify that this patient is confined to the home and needs intermittent skilled nursing care, physical therapy and/or speech therapy.  The  patient is under my care, and I have initiated the establishment of the plan of care.  This patient will be followed by a physician who will periodically review the plan of care.  Physician/Provider to provide follow up care: Raji Reynoso    Responsible Medicare certified PECOS Physician:  Dominic Mae MD  Physician Signature:    Dr. Dominic mae MD  Date: 1/29/2024

## 2024-02-01 NOTE — PROGRESS NOTES
"[facility] \"24 - 2.5mg tabs of oxycodone.    At discharged changed her orders to be   Oxycodone 5mg 2.5mg tabs take 2 tabs (5mg) or 3 tabs (7.5mg) by mouth every 4 hours PRN for s/p spinal fusion of thoracic spine.    At the facility she was scheduled on 7.5mg every 4 hours with BID prn.      Sent script to [facility] pharmacy.    Rebeka Ventura, APRN CNP    "

## 2024-02-07 ENCOUNTER — DOCUMENTATION ONLY (OUTPATIENT)
Dept: GERIATRICS | Facility: CLINIC | Age: 64
End: 2024-02-07
Payer: MEDICARE

## 2024-06-17 PROBLEM — Z71.89 ADVANCED DIRECTIVES, COUNSELING/DISCUSSION: Status: RESOLVED | Noted: 2017-03-08 | Resolved: 2024-06-17

## 2024-11-02 ENCOUNTER — HEALTH MAINTENANCE LETTER (OUTPATIENT)
Age: 64
End: 2024-11-02

## 2025-05-25 ENCOUNTER — HEALTH MAINTENANCE LETTER (OUTPATIENT)
Age: 65
End: 2025-05-25

## (undated) DEVICE — SYR 05ML LL W/O NDL

## (undated) DEVICE — NDL SPINAL 25GA 4.69" QUINCKE 405234

## (undated) DEVICE — TRAY PROCEDURE SUPPORT PAIN MANAGEMENT 332114

## (undated) DEVICE — NDL ECLIPSE 18GA 1.5"

## (undated) DEVICE — PREP CHLORAPREP 26ML TINTED ORANGE  260815

## (undated) DEVICE — NDL COUNTER 20CT 31142493

## (undated) DEVICE — TUBING IV EXTENSION SET 34"

## (undated) DEVICE — GLOVE PROTEXIS W/NEU-THERA 7.5  2D73TE75

## (undated) RX ORDER — LIDOCAINE HYDROCHLORIDE 20 MG/ML
INJECTION, SOLUTION EPIDURAL; INFILTRATION; INTRACAUDAL; PERINEURAL
Status: DISPENSED
Start: 2017-03-09

## (undated) RX ORDER — PROPOFOL 10 MG/ML
INJECTION, EMULSION INTRAVENOUS
Status: DISPENSED
Start: 2017-03-09